# Patient Record
Sex: MALE | Race: WHITE | NOT HISPANIC OR LATINO | Employment: OTHER | URBAN - METROPOLITAN AREA
[De-identification: names, ages, dates, MRNs, and addresses within clinical notes are randomized per-mention and may not be internally consistent; named-entity substitution may affect disease eponyms.]

---

## 2023-10-10 DIAGNOSIS — C90.00 MULTIPLE MYELOMA, REMISSION STATUS UNSPECIFIED (MULTI): ICD-10-CM

## 2023-10-12 DIAGNOSIS — Z51.81 ENCOUNTER FOR THERAPEUTIC DRUG LEVEL MONITORING: ICD-10-CM

## 2023-10-12 DIAGNOSIS — I25.722 ATHEROSCLEROSIS OF AUTOLOGOUS ARTERY CORONARY ARTERY BYPASS GRAFT(S) WITH REFRACTORY ANGINA PECTORIS (CMS-HCC): ICD-10-CM

## 2023-10-12 DIAGNOSIS — Z98.890 HISTORY OF BEING SCREENED FOR LEAD EXPOSURE: ICD-10-CM

## 2023-10-12 DIAGNOSIS — R94.31 ABNORMAL ELECTROCARDIOGRAM (ECG) (EKG): ICD-10-CM

## 2023-10-12 DIAGNOSIS — R79.1 ABNORMAL COAGULATION PROFILE: ICD-10-CM

## 2023-10-12 DIAGNOSIS — C90.00 MULTIPLE MYELOMA NOT HAVING ACHIEVED REMISSION (MULTI): Primary | ICD-10-CM

## 2023-10-18 ENCOUNTER — DOCUMENTATION (OUTPATIENT)
Dept: OTHER | Facility: HOSPITAL | Age: 78
End: 2023-10-18
Payer: MEDICARE

## 2023-10-18 NOTE — PROGRESS NOTES
10/18/2023 9:00am    Today I met with patient and his wife Soni to begin our discussion about CAR-T cell transplant. I explained that Dr. Pizarro has discussed with him the need for CAR-T transplant. We discussed the general concept of transplant including T-cell collection, chemotherapy administration, and cell transfusion. We reviewed the workup process including organ function testing and laboratory testing, required for MD and financial clearance to proceed with cell collection and transplant. Patient was told the collection timeline and informed about the timeline for manufacturing of the specialized CAR-T cells. The administration and potential side effects of these medications and treatments were reviewed. The patient was provided with translated [employer] materials on the required drugs. The patient was educated about the T-cell collection process and discussed that he may need a central line placed for collection. Patient will be receiving Fludarabine and Cyclophosphamide as the preparative regimen prior to transplant. Administration and potential side effects of this treatment were reviewed with the patient. We discussed the 2 most common side effects of CAR-T cell transplants - Cytokine Release Syndrome and Neurotoxicity. Infection prevention measures such as strict hand washing, low pathogen diet, daily showering/CHG bathing, and frequent walking were reviewed. We discussed the goals of discharge and the need for a reliable caregiver to help monitor his wellbeing post-transplant and accompany him to post-transplant visits, which will occur in person 3 times per week, and virtually 3 times per week for 30 days post-transplant. We also explained that patient cannot drive for 8 weeks. The patient verbalized understanding of these measures, and stated that his daughter Zoey Fisher will primary caregiver. Patient will be discharged to home as he lives within 1 hr from .    Patient and wife were  engaged in the education process. We discussed that this information will continue to be reviewed throughout the workup process. I will be the transplant coordinator following his case and will address transplant related questions and concerns as needed.    Gertrudis Olivarez RN

## 2023-10-23 ENCOUNTER — TELEPHONE (OUTPATIENT)
Dept: HEMATOLOGY/ONCOLOGY | Facility: HOSPITAL | Age: 78
End: 2023-10-23
Payer: MEDICARE

## 2023-10-23 NOTE — TELEPHONE ENCOUNTER
Spoke with David. I have to have Dr. Contreras sign the order and then I will fax it over to [facility] tomorrow morning. Patient verbalized understanding and no further questions at this time..

## 2023-10-25 DIAGNOSIS — C90.00 MULTIPLE MYELOMA NOT HAVING ACHIEVED REMISSION (MULTI): ICD-10-CM

## 2023-10-25 DIAGNOSIS — Z94.84 STEM CELLS TRANSPLANT STATUS (MULTI): ICD-10-CM

## 2023-10-30 ENCOUNTER — SOCIAL WORK (OUTPATIENT)
Dept: HEMATOLOGY/ONCOLOGY | Facility: HOSPITAL | Age: 78
End: 2023-10-30
Payer: MEDICARE

## 2023-10-30 NOTE — PROGRESS NOTES
SW was notified by Nurse Coordinator that patient is to return home following discharge from CAR-T admission, and no psychosocial needs were identified at time of coordinator education.  Still, SW attempted to reach out to introduce self and services, but received voicemail. Left voicemail requesting reason for callback and assess if there are areas in need of assistance. Awaiting callback.     ADDENDUM:  SW received callback; patient requests assistance with any available grants, but states he does not have an email address for applications to be mailed. Requests [employer] packet mailed to him with available abisai opportunities.    ADDENDUM 11/16/23 @ 11:37:  SW attempted to follow up with patient via phone on this day to inquire as to whether or not patient had received abisai applications via [employer]. Received voicemail--left voicemail requesting callback to discuss. Awaiting callback.

## 2023-11-03 DIAGNOSIS — C90.00 MULTIPLE MYELOMA NOT HAVING ACHIEVED REMISSION (MULTI): ICD-10-CM

## 2023-11-03 DIAGNOSIS — Z94.84 STEM CELLS TRANSPLANT STATUS (MULTI): ICD-10-CM

## 2023-11-05 PROBLEM — G62.9 PERIPHERAL NEUROPATHY: Status: ACTIVE | Noted: 2023-11-05

## 2023-11-05 PROBLEM — C90.00 MULTIPLE MYELOMA (MULTI): Status: ACTIVE | Noted: 2023-11-05

## 2023-11-05 RX ORDER — SODIUM CHLORIDE 0.65 %
AEROSOL, SPRAY (ML) NASAL 3 TIMES DAILY PRN
COMMUNITY
Start: 2022-12-12 | End: 2024-01-31 | Stop reason: ALTCHOICE

## 2023-11-07 ENCOUNTER — OFFICE VISIT (OUTPATIENT)
Dept: CARDIOLOGY | Facility: HOSPITAL | Age: 78
End: 2023-11-07
Payer: MEDICARE

## 2023-11-07 ENCOUNTER — CLINICAL SUPPORT (OUTPATIENT)
Dept: OTHER | Facility: HOSPITAL | Age: 78
End: 2023-11-07
Payer: MEDICARE

## 2023-11-07 ENCOUNTER — HOSPITAL ENCOUNTER (OUTPATIENT)
Dept: RADIOLOGY | Facility: HOSPITAL | Age: 78
Discharge: HOME | End: 2023-11-07
Payer: MEDICARE

## 2023-11-07 ENCOUNTER — HOSPITAL ENCOUNTER (OUTPATIENT)
Dept: CARDIOLOGY | Facility: HOSPITAL | Age: 78
Discharge: HOME | End: 2023-11-07
Payer: MEDICARE

## 2023-11-07 ENCOUNTER — LAB (OUTPATIENT)
Dept: LAB | Facility: HOSPITAL | Age: 78
End: 2023-11-07
Payer: MEDICARE

## 2023-11-07 VITALS
DIASTOLIC BLOOD PRESSURE: 71 MMHG | HEART RATE: 60 BPM | OXYGEN SATURATION: 100 % | SYSTOLIC BLOOD PRESSURE: 141 MMHG | WEIGHT: 165.12 LBS | RESPIRATION RATE: 18 BRPM | HEIGHT: 67 IN | TEMPERATURE: 97.9 F | BODY MASS INDEX: 25.92 KG/M2

## 2023-11-07 DIAGNOSIS — C90.00 MULTIPLE MYELOMA NOT HAVING ACHIEVED REMISSION (MULTI): ICD-10-CM

## 2023-11-07 DIAGNOSIS — Z51.81 ENCOUNTER FOR THERAPEUTIC DRUG LEVEL MONITORING: ICD-10-CM

## 2023-11-07 DIAGNOSIS — Z01.810 PREOP CARDIOVASCULAR EXAM: Primary | ICD-10-CM

## 2023-11-07 DIAGNOSIS — I25.722 ATHEROSCLEROSIS OF AUTOLOGOUS ARTERY CORONARY ARTERY BYPASS GRAFT(S) WITH REFRACTORY ANGINA PECTORIS (CMS-HCC): ICD-10-CM

## 2023-11-07 DIAGNOSIS — Z98.890 HISTORY OF BEING SCREENED FOR LEAD EXPOSURE: ICD-10-CM

## 2023-11-07 DIAGNOSIS — R60.0 LOCALIZED EDEMA: ICD-10-CM

## 2023-11-07 DIAGNOSIS — R79.1 ABNORMAL COAGULATION PROFILE: ICD-10-CM

## 2023-11-07 DIAGNOSIS — R94.31 ABNORMAL ELECTROCARDIOGRAM (ECG) (EKG): ICD-10-CM

## 2023-11-07 PROBLEM — Z94.84 STEM CELLS TRANSPLANT STATUS (MULTI): Status: ACTIVE | Noted: 2023-11-07

## 2023-11-07 LAB
ABO GROUP (TYPE) IN BLOOD: NORMAL
ALBUMIN SERPL BCP-MCNC: 4 G/DL (ref 3.4–5)
ALP SERPL-CCNC: 64 U/L (ref 33–136)
ALT SERPL W P-5'-P-CCNC: 16 U/L (ref 10–52)
AMPHETAMINES UR QL SCN: NORMAL
ANION GAP SERPL CALC-SCNC: 12 MMOL/L (ref 10–20)
ANTIBODY SCREEN: NORMAL
APTT PPP: 28 SECONDS (ref 27–38)
AST SERPL W P-5'-P-CCNC: 17 U/L (ref 9–39)
B2 MICROGLOB SERPL-MCNC: 2.6 MG/L (ref 0.7–2.2)
BARBITURATES UR QL SCN: NORMAL
BASOPHILS # BLD AUTO: 0.02 X10*3/UL (ref 0–0.1)
BASOPHILS NFR BLD AUTO: 0.2 %
BENZODIAZ UR QL SCN: NORMAL
BILIRUB SERPL-MCNC: 0.7 MG/DL (ref 0–1.2)
BNP SERPL-MCNC: 24 PG/ML (ref 0–99)
BUN SERPL-MCNC: 21 MG/DL (ref 6–23)
BZE UR QL SCN: NORMAL
CA-I BLD-SCNC: 1.34 MMOL/L (ref 1.1–1.33)
CALCIUM SERPL-MCNC: 10.3 MG/DL (ref 8.6–10.3)
CANNABINOIDS UR QL SCN: NORMAL
CARDIAC TROPONIN I PNL SERPL HS: 10 NG/L (ref 0–53)
CHLORIDE SERPL-SCNC: 107 MMOL/L (ref 98–107)
CMV IGG AVIDITY SERPL IA-RTO: REACTIVE %
CO2 SERPL-SCNC: 26 MMOL/L (ref 21–32)
CREAT SERPL-MCNC: 0.85 MG/DL (ref 0.5–1.3)
EOSINOPHIL # BLD AUTO: 0.02 X10*3/UL (ref 0–0.4)
EOSINOPHIL NFR BLD AUTO: 0.2 %
ERYTHROCYTE [DISTWIDTH] IN BLOOD BY AUTOMATED COUNT: 13.7 % (ref 11.5–14.5)
FENTANYL+NORFENTANYL UR QL SCN: NORMAL
GFR SERPL CREATININE-BSD FRML MDRD: 89 ML/MIN/1.73M*2
GLUCOSE BLD MANUAL STRIP-MCNC: 108 MG/DL (ref 74–99)
GLUCOSE SERPL-MCNC: 106 MG/DL (ref 74–99)
HBV CORE AB SER QL: NONREACTIVE
HBV CORE IGM SER QL: NONREACTIVE
HBV SURFACE AB SER-ACNC: <3.1 MIU/ML
HBV SURFACE AG SERPL QL IA: NONREACTIVE
HCT VFR BLD AUTO: 36.1 % (ref 41–52)
HCV AB SER QL: NONREACTIVE
HERPES SIMPLEX VIRUS 1 IGG: <0.2 INDEX
HERPES SIMPLEX VIRUS 2 IGG: <0.2 INDEX
HGB BLD-MCNC: 12 G/DL (ref 13.5–17.5)
HIV 1+2 AB+HIV1 P24 AG SERPL QL IA: NONREACTIVE
IGA SERPL-MCNC: <7 MG/DL (ref 70–400)
IGG SERPL-MCNC: 1920 MG/DL (ref 700–1600)
IGM SERPL-MCNC: <5 MG/DL (ref 40–230)
IMM GRANULOCYTES # BLD AUTO: 0.06 X10*3/UL (ref 0–0.5)
IMM GRANULOCYTES NFR BLD AUTO: 0.5 % (ref 0–0.9)
INR PPP: 1.1 (ref 0.9–1.1)
LYMPHOCYTES # BLD AUTO: 1.25 X10*3/UL (ref 0.8–3)
LYMPHOCYTES NFR BLD AUTO: 10.3 %
MAGNESIUM SERPL-MCNC: 2.03 MG/DL (ref 1.6–2.4)
MCH RBC QN AUTO: 34.6 PG (ref 26–34)
MCHC RBC AUTO-ENTMCNC: 33.2 G/DL (ref 32–36)
MCV RBC AUTO: 104 FL (ref 80–100)
MONOCYTES # BLD AUTO: 0.77 X10*3/UL (ref 0.05–0.8)
MONOCYTES NFR BLD AUTO: 6.3 %
NEUTROPHILS # BLD AUTO: 10.03 X10*3/UL (ref 1.6–5.5)
NEUTROPHILS NFR BLD AUTO: 82.5 %
NRBC BLD-RTO: 0 /100 WBCS (ref 0–0)
OPIATES UR QL SCN: NORMAL
OXYCODONE+OXYMORPHONE UR QL SCN: NORMAL
PCP UR QL SCN: NORMAL
PLATELET # BLD AUTO: 325 X10*3/UL (ref 150–450)
POTASSIUM SERPL-SCNC: 3.6 MMOL/L (ref 3.5–5.3)
PROT SERPL-MCNC: 7.4 G/DL (ref 6.4–8.2)
PROT SERPL-MCNC: 7.5 G/DL (ref 6.4–8.2)
PROTHROMBIN TIME: 12.7 SECONDS (ref 9.8–12.8)
RBC # BLD AUTO: 3.47 X10*6/UL (ref 4.5–5.9)
RH FACTOR (ANTIGEN D): NORMAL
SODIUM SERPL-SCNC: 141 MMOL/L (ref 136–145)
T GONDII IGG SER-ACNC: NONREACTIVE
T PALLIDUM AB SER QL: NONREACTIVE
URATE SERPL-MCNC: 6.1 MG/DL (ref 4–7.5)
VARICELLA ZOSTER IGG INDEX: 0.6 IA
VZV IGG SER QL IA: NEGATIVE
WBC # BLD AUTO: 12.2 X10*3/UL (ref 4.4–11.3)

## 2023-11-07 PROCEDURE — 99204 OFFICE O/P NEW MOD 45 MIN: CPT | Performed by: STUDENT IN AN ORGANIZED HEALTH CARE EDUCATION/TRAINING PROGRAM

## 2023-11-07 PROCEDURE — 84165 PROTEIN E-PHORESIS SERUM: CPT | Performed by: STUDENT IN AN ORGANIZED HEALTH CARE EDUCATION/TRAINING PROGRAM

## 2023-11-07 PROCEDURE — 83021 HEMOGLOBIN CHROMOTOGRAPHY: CPT

## 2023-11-07 PROCEDURE — 82330 ASSAY OF CALCIUM: CPT

## 2023-11-07 PROCEDURE — 86644 CMV ANTIBODY: CPT

## 2023-11-07 PROCEDURE — 1126F AMNT PAIN NOTED NONE PRSNT: CPT | Performed by: STUDENT IN AN ORGANIZED HEALTH CARE EDUCATION/TRAINING PROGRAM

## 2023-11-07 PROCEDURE — 87340 HEPATITIS B SURFACE AG IA: CPT

## 2023-11-07 PROCEDURE — 84165 PROTEIN E-PHORESIS SERUM: CPT

## 2023-11-07 PROCEDURE — 86900 BLOOD TYPING SEROLOGIC ABO: CPT | Mod: 91

## 2023-11-07 PROCEDURE — 1159F MED LIST DOCD IN RCRD: CPT | Performed by: STUDENT IN AN ORGANIZED HEALTH CARE EDUCATION/TRAINING PROGRAM

## 2023-11-07 PROCEDURE — 86705 HEP B CORE ANTIBODY IGM: CPT

## 2023-11-07 PROCEDURE — 82784 ASSAY IGA/IGD/IGG/IGM EACH: CPT

## 2023-11-07 PROCEDURE — 36415 COLL VENOUS BLD VENIPUNCTURE: CPT

## 2023-11-07 PROCEDURE — 84550 ASSAY OF BLOOD/URIC ACID: CPT

## 2023-11-07 PROCEDURE — 83521 IG LIGHT CHAINS FREE EACH: CPT

## 2023-11-07 PROCEDURE — 87799 DETECT AGENT NOS DNA QUANT: CPT

## 2023-11-07 PROCEDURE — 85025 COMPLETE CBC W/AUTO DIFF WBC: CPT

## 2023-11-07 PROCEDURE — 3430000001 HC RX 343 DIAGNOSTIC RADIOPHARMACEUTICALS: Performed by: INTERNAL MEDICINE

## 2023-11-07 PROCEDURE — 93005 ELECTROCARDIOGRAM TRACING: CPT

## 2023-11-07 PROCEDURE — 85730 THROMBOPLASTIN TIME PARTIAL: CPT | Mod: 91

## 2023-11-07 PROCEDURE — 86777 TOXOPLASMA ANTIBODY: CPT

## 2023-11-07 PROCEDURE — 86803 HEPATITIS C AB TEST: CPT

## 2023-11-07 PROCEDURE — 86645 CMV ANTIBODY IGM: CPT

## 2023-11-07 PROCEDURE — 80307 DRUG TEST PRSMV CHEM ANLYZR: CPT

## 2023-11-07 PROCEDURE — 93306 TTE W/DOPPLER COMPLETE: CPT

## 2023-11-07 PROCEDURE — 93010 ELECTROCARDIOGRAM REPORT: CPT | Performed by: INTERNAL MEDICINE

## 2023-11-07 PROCEDURE — A9552 F18 FDG: HCPCS | Performed by: INTERNAL MEDICINE

## 2023-11-07 PROCEDURE — 86870 RBC ANTIBODY IDENTIFICATION: CPT

## 2023-11-07 PROCEDURE — 86787 VARICELLA-ZOSTER ANTIBODY: CPT

## 2023-11-07 PROCEDURE — 82232 ASSAY OF BETA-2 PROTEIN: CPT

## 2023-11-07 PROCEDURE — 85730 THROMBOPLASTIN TIME PARTIAL: CPT

## 2023-11-07 PROCEDURE — 89240 UNLISTED MISC PATH TEST: CPT

## 2023-11-07 PROCEDURE — 87389 HIV-1 AG W/HIV-1&-2 AB AG IA: CPT

## 2023-11-07 PROCEDURE — 86704 HEP B CORE ANTIBODY TOTAL: CPT

## 2023-11-07 PROCEDURE — 82947 ASSAY GLUCOSE BLOOD QUANT: CPT

## 2023-11-07 PROCEDURE — 86706 HEP B SURFACE ANTIBODY: CPT

## 2023-11-07 PROCEDURE — 86664 EPSTEIN-BARR NUCLEAR ANTIGEN: CPT

## 2023-11-07 PROCEDURE — 84155 ASSAY OF PROTEIN SERUM: CPT

## 2023-11-07 PROCEDURE — 86077 PHYS BLOOD BANK SERV XMATCH: CPT | Performed by: STUDENT IN AN ORGANIZED HEALTH CARE EDUCATION/TRAINING PROGRAM

## 2023-11-07 PROCEDURE — 83880 ASSAY OF NATRIURETIC PEPTIDE: CPT

## 2023-11-07 PROCEDURE — 86790 VIRUS ANTIBODY NOS: CPT

## 2023-11-07 PROCEDURE — 86780 TREPONEMA PALLIDUM: CPT

## 2023-11-07 PROCEDURE — 93306 TTE W/DOPPLER COMPLETE: CPT | Performed by: INTERNAL MEDICINE

## 2023-11-07 PROCEDURE — 78816 PET IMAGE W/CT FULL BODY: CPT | Performed by: NUCLEAR MEDICINE

## 2023-11-07 PROCEDURE — 78816 PET IMAGE W/CT FULL BODY: CPT

## 2023-11-07 PROCEDURE — 84484 ASSAY OF TROPONIN QUANT: CPT

## 2023-11-07 PROCEDURE — 83735 ASSAY OF MAGNESIUM: CPT

## 2023-11-07 PROCEDURE — 86850 RBC ANTIBODY SCREEN: CPT

## 2023-11-07 PROCEDURE — 86696 HERPES SIMPLEX TYPE 2 TEST: CPT

## 2023-11-07 PROCEDURE — 84155 ASSAY OF PROTEIN SERUM: CPT | Mod: 59

## 2023-11-07 PROCEDURE — 86665 EPSTEIN-BARR CAPSID VCA: CPT | Mod: 91

## 2023-11-07 PROCEDURE — 86696 HERPES SIMPLEX TYPE 2 TEST: CPT | Mod: 91

## 2023-11-07 PROCEDURE — 80053 COMPREHEN METABOLIC PANEL: CPT

## 2023-11-07 PROCEDURE — 99214 OFFICE O/P EST MOD 30 MIN: CPT | Mod: 25 | Performed by: STUDENT IN AN ORGANIZED HEALTH CARE EDUCATION/TRAINING PROGRAM

## 2023-11-07 PROCEDURE — 83020 HEMOGLOBIN ELECTROPHORESIS: CPT | Performed by: PATHOLOGY

## 2023-11-07 RX ORDER — FLUDEOXYGLUCOSE F 18 200 MCI/ML
10.3 INJECTION, SOLUTION INTRAVENOUS
Status: COMPLETED | OUTPATIENT
Start: 2023-11-07 | End: 2023-11-07

## 2023-11-07 RX ORDER — GABAPENTIN 300 MG/1
300 CAPSULE ORAL 4 TIMES DAILY
COMMUNITY
End: 2024-01-31 | Stop reason: SDUPTHER

## 2023-11-07 RX ORDER — ACYCLOVIR 400 MG/1
TABLET ORAL
COMMUNITY
End: 2024-01-31 | Stop reason: SDUPTHER

## 2023-11-07 RX ORDER — DEXAMETHASONE 4 MG/1
4 TABLET ORAL
COMMUNITY
End: 2024-01-31 | Stop reason: ALTCHOICE

## 2023-11-07 RX ADMIN — FLUDEOXYGLUCOSE F 18 10.3 MILLICURIE: 200 INJECTION, SOLUTION INTRAVENOUS at 09:00

## 2023-11-07 ASSESSMENT — PAIN - FUNCTIONAL ASSESSMENT: PAIN_FUNCTIONAL_ASSESSMENT: 0-10

## 2023-11-07 ASSESSMENT — PAIN SCALES - GENERAL
PAINLEVEL_OUTOF10: 0 - NO PAIN
PAINLEVEL: 0-NO PAIN

## 2023-11-07 ASSESSMENT — ENCOUNTER SYMPTOMS
DEPRESSION: 0
OCCASIONAL FEELINGS OF UNSTEADINESS: 0
LOSS OF SENSATION IN FEET: 0

## 2023-11-07 NOTE — PROGRESS NOTES
"Pt ambulated to SCT unit without assistance. Pt accompanied by son today.  Pt denies complaints or pain today. Vitals obtained, blood drawn in outpatient lab prior to arrival on unit. Pt on floor for DHQ prior to stem cell collection today:  \"Today I had the pleasure of meeting  78 year old David Ferreira(MRN [id number]) and his son for ABECMA CAR-T Q. He is a MM patient. Patient will be covered by Dr. Cerrato. His tentative date of collection is set for 12/4/23 in the ambulatory stem cell unit with line placement that morning. Pt has history of peripheral neuropathy. Pt also has surgical history of right carpal tunnel repair with right elbow and ulnar decompression (2021) and hernia repair (2011).  No recent travel or exposure to any communicable diseases. Reviewed PI sheet with patient and answered all questions. Reviewed this information with Dr. Keller; pt will be started on IV calcium at beginning of collection due to peripheral neuropathy.     Allergies:  NKA     Vital Signs: Ht= 169.3cm , Wt= 74.9kg, BP= 141/71, SpO2= 100% @room air, RR= 18, T= 36.6                      HR= 60  Medications: Acyclovir, Gabapentin, melatonin, capazasin                           Chemo: Dexamethasone, Kyprolis, and Daratumumab\"       Pt ambulated off unit without complaints or assistance.  "

## 2023-11-07 NOTE — PROGRESS NOTES
Subjective   David Ferreira is a 78 y.o. male     Cancer Diagnosis: MM   Treatment: VRD 3/2023, D-Kd 5/2023   Cumulative Dose: NA  Radiation: No     Risk Factors:  Social Hx: Former Smoker   Family Hx: Father CAD age 59    Echo 11/7/2023: My review shows preserved ejection fraction (55 and 60%), mild aortic regurgitation.  No clear evidence of cardiac amyloidosis  EKG November 7, 2023: Normal sinus rhythm, no resting ST-T segment changes; minimal voltage criteria for LVH.      He is here for cardiovascular evaluation prior to CAR-T        Patient is a 78-year-old gentleman with a diagnosis of multiple myeloma.  Has been on treatment with VRD chemotherapy; other medical history includes peripheral neuropathy and some orthopedic complaints including carpal tunnel syndrome.  Intent is to pursue possible CAR-T.     On this visit patient denies any chest discomfort or shortness of breath with exertion.  Denies any orthopnea/PND.  Has minimal lower extremity edema with evidence of varicosities in his lower extremities (bilateral ankles)    Review of Systems  A comprehensive review of systems was negative.     Past medical history: Rotator cuff dysfunction, multiple myeloma  No past surgical history on file.  No Known Allergies    Family history of CAD in his father (age 59)    Physical Exam  Objective   HR 60bpm, /71, 97.9F        General: awake, alert and oriented. No acute distress.   Skin: Skin is warm, dry and intact without rashes or lesions. Appropriate color for ethnicity. Nail beds pink with no cyanosis or clubbing  HEENT: normocephalic, atraumatic; conjunctivae are clear without exudates or hemorrhage. Sclera is non-icteric. Eyelids are normal in appearance without swelling or lesions. Hearing intact. Nares are patent bilaterally. Moist mucous membranes.   Cardiovascular: Regular. No murmurs, gallops, or rubs are auscultated. S1 and S2 are heard and are of normal intensity. No JVD, no carotid  bruits  Respiratory: Thorax symmetric. CTAB, breath sounds vesicular. No crackles, wheezes or ronchi.   Gastrointestinal: soft, non-distended, BS + x 4  Genitourinary: exam deferred  Musculoskeletal: moves all extremities  Extremities: pulses palpable bilaterally; no swelling or erythema; no edema  Neurological: alert & oriented x 3; no focal deficits  Psychiatric: appropriate mood and affect        Lab Review   Lab Results   Component Value Date    HGB 12.0 (L) 11/07/2023    HGB 10.4 (L) 07/20/2023     11/07/2023    WBC 12.2 (H) 11/07/2023     11/07/2023    K 3.6 11/07/2023    CREATININE 0.85 11/07/2023    CREATININE 1.19 07/20/2023    BUN 21 11/07/2023    CALCIUM 10.3 11/07/2023        Assessment/Plan   Echo 11/7/2023: My review shows preserved ejection fraction (55 and 60%), mild aortic regurgitation.  No clear evidence of cardiac amyloidosis.  He is here for cardiovascular evaluation prior to CAR-T        He appears to be well optimized from the standpoint of CAR-T therapy.  No further testing required from a cardiac standpoint.  -Echocardiogram/EKG appear to be unremarkable.  We will follow-up on results of troponin/BNP.  -Minimal ankle edema is likely attributable to venous insufficiency  -Follow-up a month after discharge after CAR-T       Koby Abebe MD  Advanced Heart Failure/Transplant Cardiology  Cardio-Oncology  [facility]

## 2023-11-08 ENCOUNTER — APPOINTMENT (OUTPATIENT)
Dept: OTHER | Facility: HOSPITAL | Age: 78
End: 2023-11-08
Payer: MEDICARE

## 2023-11-08 LAB
AORTIC VALVE PEAK VELOCITY: 1.32
AV PEAK GRADIENT: 7
AVA (PEAK VEL): 2.26
EBV DNA SPEC NAA+PROBE-LOG#: NORMAL {LOG_COPIES}/ML
EBV EA IGG SER QL: NEGATIVE
EBV NA AB SER QL: NEGATIVE
EBV VCA IGG SER IA-ACNC: POSITIVE
EBV VCA IGM SER IA-ACNC: ABNORMAL
EJECTION FRACTION APICAL 4 CHAMBER: 67.4
EJECTION FRACTION: 63
KAPPA LC SERPL-MCNC: 47.2 MG/DL (ref 0.33–1.94)
KAPPA LC/LAMBDA SER: ABNORMAL {RATIO}
LABORATORY COMMENT REPORT: NOT DETECTED
LAMBDA LC SERPL-MCNC: <0.17 MG/DL (ref 0.57–2.63)
LEFT ATRIUM VOLUME AREA LENGTH INDEX BSA: 42.3
LEFT VENTRICLE INTERNAL DIMENSION DIASTOLE: 4.6 (ref 3.5–6)
LEFT VENTRICULAR OUTFLOW TRACT DIAMETER: 2
MITRAL VALVE E/A RATIO: 1.34
RIGHT VENTRICLE FREE WALL PEAK S': 13.5
RIGHT VENTRICLE PEAK SYSTOLIC PRESSURE: 26.4
TRICUSPID ANNULAR PLANE SYSTOLIC EXCURSION: 1.4

## 2023-11-09 ENCOUNTER — PROCEDURE VISIT (OUTPATIENT)
Dept: OTHER | Facility: HOSPITAL | Age: 78
End: 2023-11-09
Payer: MEDICARE

## 2023-11-09 ENCOUNTER — APPOINTMENT (OUTPATIENT)
Dept: RADIOLOGY | Facility: HOSPITAL | Age: 78
End: 2023-11-09
Payer: MEDICARE

## 2023-11-09 ENCOUNTER — HOSPITAL ENCOUNTER (OUTPATIENT)
Dept: RADIOLOGY | Facility: EXTERNAL LOCATION | Age: 78
Discharge: HOME | End: 2023-11-09

## 2023-11-09 VITALS
RESPIRATION RATE: 18 BRPM | OXYGEN SATURATION: 100 % | TEMPERATURE: 98.2 F | DIASTOLIC BLOOD PRESSURE: 86 MMHG | HEART RATE: 79 BPM | WEIGHT: 166.89 LBS | SYSTOLIC BLOOD PRESSURE: 150 MMHG | BODY MASS INDEX: 26.41 KG/M2

## 2023-11-09 DIAGNOSIS — C90.00 MULTIPLE MYELOMA, REMISSION STATUS UNSPECIFIED (MULTI): Primary | ICD-10-CM

## 2023-11-09 LAB
ADENOVIRUS QPCR,PLASMA, VIRC: NOT DETECTED COPIES/ML
ALBUMIN: 4.2 G/DL (ref 3.4–5)
ALPHA 1 GLOBULIN: 0.3 G/DL (ref 0.2–0.6)
ALPHA 2 GLOBULIN: 0.7 G/DL (ref 0.4–1.1)
BASOPHILS # BLD AUTO: 0.02 X10*3/UL (ref 0–0.1)
BASOPHILS NFR BLD AUTO: 0.2 %
BETA GLOBULIN: 0.6 G/DL (ref 0.5–1.2)
CMV DNA SERPL NAA+PROBE-LOG IU: NORMAL {LOG_IU}/ML
CMV IGM SERPL-ACNC: <8 AU/ML
EOSINOPHIL # BLD AUTO: 0.1 X10*3/UL (ref 0–0.4)
EOSINOPHIL NFR BLD AUTO: 1.2 %
ERYTHROCYTE [DISTWIDTH] IN BLOOD BY AUTOMATED COUNT: 13.3 % (ref 11.5–14.5)
GAMMA GLOBULIN: 1.7 G/DL (ref 0.5–1.4)
HCT VFR BLD AUTO: 36.9 % (ref 41–52)
HEMOGLOBIN A2: 2.7 % (ref 2–3.5)
HEMOGLOBIN A: 97.1 % (ref 95.8–98)
HEMOGLOBIN F: 0.2 % (ref 0–2)
HEMOGLOBIN IDENTIFICATION INTERPRETATION: NORMAL
HGB BLD-MCNC: 12.3 G/DL (ref 13.5–17.5)
IMM GRANULOCYTES # BLD AUTO: 0.09 X10*3/UL (ref 0–0.5)
IMM GRANULOCYTES NFR BLD AUTO: 1.1 % (ref 0–0.9)
LABORATORY COMMENT REPORT: NOT DETECTED
LYMPHOCYTES # BLD AUTO: 1.3 X10*3/UL (ref 0.8–3)
LYMPHOCYTES NFR BLD AUTO: 15.3 %
M-PROTEIN 1: 1.3 G/DL
MCH RBC QN AUTO: 34.6 PG (ref 26–34)
MCHC RBC AUTO-ENTMCNC: 33.3 G/DL (ref 32–36)
MCV RBC AUTO: 104 FL (ref 80–100)
MONOCYTES # BLD AUTO: 0.7 X10*3/UL (ref 0.05–0.8)
MONOCYTES NFR BLD AUTO: 8.2 %
NEUTROPHILS # BLD AUTO: 6.31 X10*3/UL (ref 1.6–5.5)
NEUTROPHILS NFR BLD AUTO: 74 %
NRBC BLD-RTO: 0 /100 WBCS (ref 0–0)
PATH REVIEW-HGB IDENTIFICATION: NORMAL
PATH REVIEW-SERUM PROTEIN ELECTROPHORESIS: ABNORMAL
PLATELET # BLD AUTO: 282 X10*3/UL (ref 150–450)
PROTEIN ELECTROPHORESIS COMMENT: ABNORMAL
RBC # BLD AUTO: 3.55 X10*6/UL (ref 4.5–5.9)
WBC # BLD AUTO: 8.5 X10*3/UL (ref 4.4–11.3)

## 2023-11-09 PROCEDURE — 88342 IMHCHEM/IMCYTCHM 1ST ANTB: CPT | Mod: TC,59

## 2023-11-09 PROCEDURE — 38222 DX BONE MARROW BX & ASPIR: CPT | Performed by: STUDENT IN AN ORGANIZED HEALTH CARE EDUCATION/TRAINING PROGRAM

## 2023-11-09 PROCEDURE — 88305 TISSUE EXAM BY PATHOLOGIST: CPT | Mod: TC,SUR

## 2023-11-09 PROCEDURE — 88341 IMHCHEM/IMCYTCHM EA ADD ANTB: CPT | Performed by: PATHOLOGY

## 2023-11-09 PROCEDURE — 88188 FLOWCYTOMETRY/READ 9-15: CPT | Performed by: PATHOLOGY

## 2023-11-09 PROCEDURE — 88237 TISSUE CULTURE BONE MARROW: CPT

## 2023-11-09 PROCEDURE — 36415 COLL VENOUS BLD VENIPUNCTURE: CPT

## 2023-11-09 PROCEDURE — 85097 BONE MARROW INTERPRETATION: CPT | Mod: TC

## 2023-11-09 PROCEDURE — 88185 FLOWCYTOMETRY/TC ADD-ON: CPT | Mod: TC

## 2023-11-09 PROCEDURE — 88189 FLOWCYTOMETRY/READ 16 & >: CPT | Performed by: PATHOLOGY

## 2023-11-09 PROCEDURE — 88184 FLOWCYTOMETRY/ TC 1 MARKER: CPT | Mod: TC

## 2023-11-09 PROCEDURE — 88305 TISSUE EXAM BY PATHOLOGIST: CPT | Performed by: PATHOLOGY

## 2023-11-09 PROCEDURE — 88187 FLOWCYTOMETRY/READ 2-8: CPT | Performed by: PATHOLOGY

## 2023-11-09 PROCEDURE — 88342 IMHCHEM/IMCYTCHM 1ST ANTB: CPT | Performed by: PATHOLOGY

## 2023-11-09 PROCEDURE — 88365 INSITU HYBRIDIZATION (FISH): CPT | Performed by: PATHOLOGY

## 2023-11-09 PROCEDURE — 85025 COMPLETE CBC W/AUTO DIFF WBC: CPT

## 2023-11-09 NOTE — PROGRESS NOTES
Bone Marrow Biopsy and Aspiration Procedure Note       Informed consent was obtained and potential risks including bleeding, infection and pain were reviewed with the patient.    Last H&P: 11/7/2023    The right posterior iliac crest was prepped with chlorhexidine.     10 ml of lidocaine 1% local anesthesia infiltrated into the subcutaneous tissue.    Rright bone marrow aspirate was obtained. Right bone marrow core biopsy was attempted twice but unable to collect specimen.     The procedure was tolerated well and there were no complications.    Specimens sent for: routine histopathologic stains and sectioning, flow cytometry, cytogenetics, molecular analysis, and Clonoseq.     Procedure completed by: Cristian Morillo PA-C consent

## 2023-11-09 NOTE — PROGRESS NOTES
Patient arrived to ASCT unit via self-ambulation for bone marrow biopsy with son. He is alert and oriented x3, denies pain or any discomfort. Vital signs obtained. Blood drawn peripherally. DORETHA Petersen performed procedure. Dressing is clean, dry and intact. Education provided. No adverse reactions, no complaints and no assistance needed. Pt left unit with Sasha, his nurse coordinator.

## 2023-11-10 LAB
BB ANTIBODY IDENTIFICATION: NORMAL
CASE #: NORMAL
HTLV I+II AB SER QL IA: NEGATIVE
PATH REV-IMMUNOHEMATOLOGY-PR30: NORMAL

## 2023-11-11 LAB — SCAN RESULT: NORMAL

## 2023-11-13 LAB
ATRIAL RATE: 64 BPM
CELL COUNT (BLOOD): 5.7 X10*3/UL
CELL POPULATIONS: NORMAL
DIAGNOSIS: NORMAL
FLOW DIFFERENTIAL: NORMAL
FLOW TEST ORDERED: NORMAL
LAB TEST METHOD: NORMAL
NUMBER OF CELLS COLLECTED: NORMAL PER TUBE
P AXIS: 34 DEGREES
P OFFSET: 167 MS
P ONSET: 107 MS
PATH REPORT.TOTAL CANCER: NORMAL
PR INTERVAL: 220 MS
Q ONSET: 217 MS
QRS COUNT: 10 BEATS
QRS DURATION: 94 MS
QT INTERVAL: 380 MS
QTC CALCULATION(BAZETT): 392 MS
QTC FREDERICIA: 388 MS
R AXIS: -9 DEGREES
SIGNATURE COMMENT: NORMAL
SPECIMEN VIABILITY: NORMAL
T AXIS: 57 DEGREES
T OFFSET: 407 MS
VENTRICULAR RATE: 64 BPM

## 2023-11-14 LAB
CHROM ANALY OVERALL INTERP-IMP: NORMAL
ELECTRONICALLY SIGNED BY CYTOGENETICS: NORMAL

## 2023-11-16 LAB
PATH REPORT.ADDENDUM SPEC: NORMAL
PATH REPORT.COMMENTS IMP SPEC: NORMAL
PATH REPORT.FINAL DX SPEC: NORMAL
PATH REPORT.GROSS SPEC: NORMAL
PATH REPORT.MICROSCOPIC SPEC OTHER STN: NORMAL
PATH REPORT.RELEVANT HX SPEC: NORMAL
PATH REPORT.TOTAL CANCER: NORMAL

## 2023-11-20 ENCOUNTER — DOCUMENTATION (OUTPATIENT)
Dept: OTHER | Facility: HOSPITAL | Age: 78
End: 2023-11-20
Payer: MEDICARE

## 2023-11-28 DIAGNOSIS — C90.00 MULTIPLE MYELOMA NOT HAVING ACHIEVED REMISSION (MULTI): ICD-10-CM

## 2023-11-30 ENCOUNTER — OFFICE VISIT (OUTPATIENT)
Dept: HEMATOLOGY/ONCOLOGY | Facility: HOSPITAL | Age: 78
End: 2023-11-30
Payer: MEDICARE

## 2023-11-30 ENCOUNTER — LAB (OUTPATIENT)
Dept: LAB | Facility: HOSPITAL | Age: 78
End: 2023-11-30
Payer: MEDICARE

## 2023-11-30 ENCOUNTER — DOCUMENTATION (OUTPATIENT)
Dept: OTHER | Facility: HOSPITAL | Age: 78
End: 2023-11-30
Payer: MEDICARE

## 2023-11-30 VITALS
WEIGHT: 170.64 LBS | HEART RATE: 73 BPM | TEMPERATURE: 97 F | BODY MASS INDEX: 27 KG/M2 | RESPIRATION RATE: 18 BRPM | OXYGEN SATURATION: 100 % | SYSTOLIC BLOOD PRESSURE: 160 MMHG | DIASTOLIC BLOOD PRESSURE: 79 MMHG

## 2023-11-30 DIAGNOSIS — C90.00 MULTIPLE MYELOMA NOT HAVING ACHIEVED REMISSION (MULTI): ICD-10-CM

## 2023-11-30 DIAGNOSIS — C90.00 MULTIPLE MYELOMA NOT HAVING ACHIEVED REMISSION (MULTI): Primary | ICD-10-CM

## 2023-11-30 LAB
ALBUMIN SERPL BCP-MCNC: 3.7 G/DL (ref 3.4–5)
ALP SERPL-CCNC: 66 U/L (ref 33–136)
ALT SERPL W P-5'-P-CCNC: 18 U/L (ref 10–52)
ANION GAP SERPL CALC-SCNC: 10 MMOL/L (ref 10–20)
AST SERPL W P-5'-P-CCNC: 17 U/L (ref 9–39)
B2 MICROGLOB SERPL-MCNC: 3.7 MG/L (ref 0.7–2.2)
BASOPHILS # BLD AUTO: 0.01 X10*3/UL (ref 0–0.1)
BASOPHILS NFR BLD AUTO: 0.2 %
BILIRUB SERPL-MCNC: 0.6 MG/DL (ref 0–1.2)
BUN SERPL-MCNC: 23 MG/DL (ref 6–23)
CALCIUM SERPL-MCNC: 10.1 MG/DL (ref 8.6–10.3)
CHLORIDE SERPL-SCNC: 108 MMOL/L (ref 98–107)
CO2 SERPL-SCNC: 28 MMOL/L (ref 21–32)
CREAT SERPL-MCNC: 0.99 MG/DL (ref 0.5–1.3)
EOSINOPHIL # BLD AUTO: 0.05 X10*3/UL (ref 0–0.4)
EOSINOPHIL NFR BLD AUTO: 0.8 %
ERYTHROCYTE [DISTWIDTH] IN BLOOD BY AUTOMATED COUNT: 13 % (ref 11.5–14.5)
GFR SERPL CREATININE-BSD FRML MDRD: 78 ML/MIN/1.73M*2
GLUCOSE SERPL-MCNC: 92 MG/DL (ref 74–99)
HCT VFR BLD AUTO: 35.3 % (ref 41–52)
HGB BLD-MCNC: 11.6 G/DL (ref 13.5–17.5)
IGA SERPL-MCNC: <7 MG/DL (ref 70–400)
IGG SERPL-MCNC: 1690 MG/DL (ref 700–1600)
IGM SERPL-MCNC: <5 MG/DL (ref 40–230)
IMM GRANULOCYTES # BLD AUTO: 0.26 X10*3/UL (ref 0–0.5)
IMM GRANULOCYTES NFR BLD AUTO: 4.1 % (ref 0–0.9)
LDH SERPL L TO P-CCNC: 176 U/L (ref 84–246)
LYMPHOCYTES # BLD AUTO: 1.35 X10*3/UL (ref 0.8–3)
LYMPHOCYTES NFR BLD AUTO: 21.4 %
MCH RBC QN AUTO: 34.5 PG (ref 26–34)
MCHC RBC AUTO-ENTMCNC: 32.9 G/DL (ref 32–36)
MCV RBC AUTO: 105 FL (ref 80–100)
MONOCYTES # BLD AUTO: 0.8 X10*3/UL (ref 0.05–0.8)
MONOCYTES NFR BLD AUTO: 12.7 %
NEUTROPHILS # BLD AUTO: 3.85 X10*3/UL (ref 1.6–5.5)
NEUTROPHILS NFR BLD AUTO: 60.8 %
NRBC BLD-RTO: 1.3 /100 WBCS (ref 0–0)
PLATELET # BLD AUTO: 142 X10*3/UL (ref 150–450)
POTASSIUM SERPL-SCNC: 4.8 MMOL/L (ref 3.5–5.3)
PROT SERPL-MCNC: 6.6 G/DL (ref 6.4–8.2)
PROT SERPL-MCNC: 7 G/DL (ref 6.4–8.2)
RBC # BLD AUTO: 3.36 X10*6/UL (ref 4.5–5.9)
SODIUM SERPL-SCNC: 141 MMOL/L (ref 136–145)
WBC # BLD AUTO: 6.3 X10*3/UL (ref 4.4–11.3)

## 2023-11-30 PROCEDURE — 84165 PROTEIN E-PHORESIS SERUM: CPT

## 2023-11-30 PROCEDURE — 84155 ASSAY OF PROTEIN SERUM: CPT

## 2023-11-30 PROCEDURE — 83615 LACTATE (LD) (LDH) ENZYME: CPT

## 2023-11-30 PROCEDURE — 82232 ASSAY OF BETA-2 PROTEIN: CPT

## 2023-11-30 PROCEDURE — 99215 OFFICE O/P EST HI 40 MIN: CPT | Performed by: INTERNAL MEDICINE

## 2023-11-30 PROCEDURE — 82784 ASSAY IGA/IGD/IGG/IGM EACH: CPT

## 2023-11-30 PROCEDURE — 1159F MED LIST DOCD IN RCRD: CPT | Performed by: INTERNAL MEDICINE

## 2023-11-30 PROCEDURE — 86320 SERUM IMMUNOELECTROPHORESIS: CPT | Performed by: STUDENT IN AN ORGANIZED HEALTH CARE EDUCATION/TRAINING PROGRAM

## 2023-11-30 PROCEDURE — 85025 COMPLETE CBC W/AUTO DIFF WBC: CPT

## 2023-11-30 PROCEDURE — 36415 COLL VENOUS BLD VENIPUNCTURE: CPT

## 2023-11-30 PROCEDURE — 83521 IG LIGHT CHAINS FREE EACH: CPT

## 2023-11-30 PROCEDURE — 1125F AMNT PAIN NOTED PAIN PRSNT: CPT | Performed by: INTERNAL MEDICINE

## 2023-11-30 PROCEDURE — 80053 COMPREHEN METABOLIC PANEL: CPT

## 2023-11-30 PROCEDURE — 84165 PROTEIN E-PHORESIS SERUM: CPT | Performed by: STUDENT IN AN ORGANIZED HEALTH CARE EDUCATION/TRAINING PROGRAM

## 2023-11-30 PROCEDURE — 1036F TOBACCO NON-USER: CPT | Performed by: INTERNAL MEDICINE

## 2023-11-30 ASSESSMENT — PAIN SCALES - GENERAL: PAINLEVEL: 5

## 2023-11-30 NOTE — PROGRESS NOTES
11/30/2023 9:00am    SCT coordinator met with patient and Dr. Contreras to discuss changes in his plan of care. Patient was initially on track to receive CART treatment but due to insurance denial is now recommended for auto SCT. Dr. Contreras went over the autologous transplant process in detail and answered all patient questions. SCT coordinator also stayed back at end of the clinic visit to do a brief education about auto SCT and answered all patient questions. Patient was told he would be sent his updated schedule soon and that he should reach out with any questions or concerns.     Gertrudis Olivarez RN.

## 2023-12-01 ENCOUNTER — APPOINTMENT (OUTPATIENT)
Dept: HEMATOLOGY/ONCOLOGY | Facility: HOSPITAL | Age: 78
End: 2023-12-01
Payer: MEDICARE

## 2023-12-01 PROBLEM — C90.00 IGG MULTIPLE MYELOMA (MULTI): Status: ACTIVE | Noted: 2023-12-01

## 2023-12-01 LAB
KAPPA LC SERPL-MCNC: 43.52 MG/DL (ref 0.33–1.94)
KAPPA LC/LAMBDA SER: ABNORMAL {RATIO}
LAMBDA LC SERPL-MCNC: <0.17 MG/DL (ref 0.57–2.63)

## 2023-12-02 NOTE — PROGRESS NOTES
Cancer History:   Treatment Synopsis:    MM IgG kappa        History of Present Illness:      ID Statement:    JEINFER HART is a 77 year old Male        Chief Complaint: MM IgG kappa   Interval History:    Patient is with newly diagnosed Multiple myeloma IgG kappa. He is here for further discussion re: auto SCT as part of consolidation therapy for Multiple Myeloma.     Recent BW showed elevated protein-> further work up c/w Multiple Myeloma. Bone marrow Bx(1/27/23) 30-40% kappa restricted plasma cells FISH without high risk features M protein 3.8 g/dL  B2M 3.8 IgG 6072 free [employer] 3.3  Kappa/Lambda ratio 589   alb 4.0  CT/PET 1/30/23 showed no abnormal lesions.  Patient denies recent weight loss, night sweats, poor appetites. He c/o right shoulder pain 2/2 rotator cuff tear.  He also c/o numbing/tingling both hands 2/2 CTS and bilateral LE pain for which he takes Gabapentin daily.  5/25/23: tolerating VRd. here for follow up. Neuropathy not worse  7/20/23: tolerating new chemo D-Kd. No recent weight loss, night sweats, SOB  9/21/23: doing well. No new c/o  11/30/23: no new c/o except neuropathy(not worse) all work for auto SCT done except PFT.     ROS: negative except HPI     PMHx: Peripheral neuropathy bilateral LE's 2016 on gabapentin   Rotator cuff tear right.  ~6 month ago   PSHx; CTS repair bilateral: right ~ 2 years ago, left 3-4 months ago     Fhx: mother with colon cancer at 74  Father with CAD at 59           Allergies and Intolerances:       Allergies:         No Known Allergies: Active     Outpatient Medication Profile:  * Patient Currently Takes Medications as of 21-Sep-2023 12:00 documented in Structured Notes         gabapentin 300 mg oral tablet : Last Dose Taken:           Melatonin 3 mg oral tablet: Last Dose Taken:  , 1 tab(s) orally once  a day (at bedtime)         capsaicin 0.1% topical cream: Last Dose Taken:          Family History: No Family History items are recorded  in  the problem list.      Social History:   Social Substance History:  ·  Smoking Status former smoker (1)   ·  Additional History     , 2 grown children 4 grand children(1)           Vitals and Measurements:   Vitals: Temp: 36.1  HR: 60  RR: 17  BP: 141/75  SPO2%:   99   Measurements: HT(cm): 167.4  WT(kg): 73.3  BSA: 1.84   BMI:  26.1      Physical Exam:      Constitutional: Well developed, awake/alert/oriented  x3, no distress, alert and cooperative   Eyes: PERRL, EOMI, clear sclera   ENMT: mucous membranes moist, no apparent injury,  no lesions seen   Head/Neck: Neck supple, no apparent injury, thyroid  without mass or tenderness, No JVD, trachea midline, no bruits   Respiratory/Thorax: Patent airways, CTAB, normal  breath sounds with good chest expansion, thorax symmetric   Cardiovascular: Regular, rate and rhythm, no murmurs,  2+ equal pulses of the extremities, normal S 1and S 2   Gastrointestinal: Nondistended, soft, non-tender,  no rebound tenderness or guarding, no masses palpable, no organomegaly, +BS, no bruits   Genitourinary: No Discharge, vesicles or other abnormalities   Musculoskeletal: ROM reduced right UE 2/2/ torn rotator  cuff, no joint swelling, normal strength   Extremities: normal extremities, no cyanosis edema,  contusions or wounds, no clubbing   Neurological: alert and oriented x3, intact senses,  motor, response and reflexes, normal strength   Breast: No masses, tenderness, no discharge or discoloration   Lymphatic: No significant lymphadenopathy   Psychological: Appropriate mood and behavior   Skin: Warm and dry, no lesions, no rashes         Lab Results:     ·  Results        CBC date/time       WBC     HGB     HCT     PLT     Neut      20-Jul-2023 13:18   6.2     10.4(L) 31.2(L) 148(L)  3.83     BMP date/time       NA              K               CL              CO2             BUN             CREAT             20-Jul-2023 13:18   138             4.1             106             N/A              23              1.19     LDH date/time       LDH     20-Jul-2023 13:18   163     Assessment and Plan:      Assessment and Plan:   Assessment:    Patient is with newly diagnosed Multiple myeloma IgG kappa. He is here for further discussion re; Auto SCT after induction therapy.  Plan:    Multiple Myeloma: IgG kappa. ISS stage II  - Bone marrow 30-40% kappa restricted plasma cells  - Fish negative   - CT/PET: negative  - b2M 3.8   - normal Ca++, Cr, LDH, alb  - VRd started in 3/2023 then switched to D-Kd in late May 23. tolerating chemo well.  - M protein 3.8>3.1>3.0>2.9>2.8>2.1>1.7>1.3  - IgG 6072>>4098>2480>>2211>1700  - Kappa LC 1944 mg/L>>920>685>435  - K/L ratio 589>438>>>457  - Partial response after ~ 8 months Rx  - Bone marrow 11/9/23  5-10% kappa restricted plasma cells.  - discussed autoSCT   - cont current chemo  - not meet criteria for CART-> proceed with AutoSCT ~ jan 2024.    Peripheral neuropathy  - bilateral LE's : on Gabapentin   - bilateral CTS-> s/p repair. still symptomatic        CTS bilateral     RTC 2 months.

## 2023-12-04 ENCOUNTER — APPOINTMENT (OUTPATIENT)
Dept: RADIOLOGY | Facility: HOSPITAL | Age: 78
End: 2023-12-04
Payer: MEDICARE

## 2023-12-04 ENCOUNTER — APPOINTMENT (OUTPATIENT)
Dept: HEMATOLOGY/ONCOLOGY | Facility: HOSPITAL | Age: 78
End: 2023-12-04
Payer: MEDICARE

## 2023-12-05 ENCOUNTER — SOCIAL WORK (OUTPATIENT)
Dept: HEMATOLOGY/ONCOLOGY | Facility: HOSPITAL | Age: 78
End: 2023-12-05
Payer: MEDICARE

## 2023-12-05 LAB
ALBUMIN: 3.7 G/DL (ref 3.4–5)
ALPHA 1 GLOBULIN: 0.3 G/DL (ref 0.2–0.6)
ALPHA 2 GLOBULIN: 0.7 G/DL (ref 0.4–1.1)
BETA GLOBULIN: 0.6 G/DL (ref 0.5–1.2)
GAMMA GLOBULIN: 1.4 G/DL (ref 0.5–1.4)
IMMUNOFIXATION COMMENT: ABNORMAL
M-PROTEIN 1: 1 G/DL
PATH REVIEW - SERUM IMMUNOFIXATION: ABNORMAL
PATH REVIEW-SERUM PROTEIN ELECTROPHORESIS: ABNORMAL
PROTEIN ELECTROPHORESIS COMMENT: ABNORMAL

## 2023-12-05 NOTE — PROGRESS NOTES
SW attempted to reach patient to schedule SW SCT assessment; received voicemail. Requested callback to schedule time to complete assessment. Awaiting callback.

## 2023-12-07 ENCOUNTER — HOSPITAL ENCOUNTER (OUTPATIENT)
Dept: RESPIRATORY THERAPY | Facility: HOSPITAL | Age: 78
Discharge: HOME | End: 2023-12-07
Payer: MEDICARE

## 2023-12-07 ENCOUNTER — SOCIAL WORK (OUTPATIENT)
Dept: HEMATOLOGY/ONCOLOGY | Facility: HOSPITAL | Age: 78
End: 2023-12-07
Payer: MEDICARE

## 2023-12-07 ENCOUNTER — APPOINTMENT (OUTPATIENT)
Dept: OTHER | Facility: HOSPITAL | Age: 78
End: 2023-12-07
Payer: MEDICARE

## 2023-12-07 DIAGNOSIS — C90.00 MULTIPLE MYELOMA NOT HAVING ACHIEVED REMISSION (MULTI): ICD-10-CM

## 2023-12-07 PROCEDURE — 94010 BREATHING CAPACITY TEST: CPT | Performed by: INTERNAL MEDICINE

## 2023-12-07 PROCEDURE — 94726 PLETHYSMOGRAPHY LUNG VOLUMES: CPT | Performed by: INTERNAL MEDICINE

## 2023-12-07 PROCEDURE — 94010 BREATHING CAPACITY TEST: CPT

## 2023-12-07 PROCEDURE — 94729 DIFFUSING CAPACITY: CPT | Performed by: INTERNAL MEDICINE

## 2023-12-07 NOTE — PROGRESS NOTES
MELODY was planning to meet with patient on this day, following his tour of [facility] with Nurse Coordinator. Unfortunately, patient left due to being upset with confusion regarding testing prior to SW being able to meet with him.    Attempted to call patient following his leaving [facility], but received voicemail. Left voicemail, requesting callback to complete psychosocial assessment as soon as able. Awaiting callback.

## 2023-12-08 LAB
MGC ASCENT PFT - FEV1 - PRE: 3.09
MGC ASCENT PFT - FEV1 - PREDICTED: 2.41
MGC ASCENT PFT - FVC - PRE: 3.92
MGC ASCENT PFT - FVC - PREDICTED: 3.19

## 2023-12-11 ENCOUNTER — SOCIAL WORK (OUTPATIENT)
Dept: HEMATOLOGY/ONCOLOGY | Facility: HOSPITAL | Age: 78
End: 2023-12-11
Payer: MEDICARE
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
## 2023-12-27 DIAGNOSIS — C90.00 MULTIPLE MYELOMA NOT HAVING ACHIEVED REMISSION (MULTI): ICD-10-CM

## 2024-01-08 ENCOUNTER — DOCUMENTATION (OUTPATIENT)
Dept: OTHER | Facility: HOSPITAL | Age: 79
End: 2024-01-08
Payer: MEDICARE

## 2024-01-15 ENCOUNTER — SOCIAL WORK (OUTPATIENT)
Dept: HEMATOLOGY/ONCOLOGY | Facility: HOSPITAL | Age: 79
End: 2024-01-15
Payer: MEDICARE

## 2024-01-15 NOTE — PROGRESS NOTES
SW attempted to reach patient on this day via phone to complete psychosocial reassessment. No answer, received voicemail box. Left voicemail requesting callback. Awaiting callback to discuss.

## 2024-01-24 DIAGNOSIS — C90.00 MULTIPLE MYELOMA NOT HAVING ACHIEVED REMISSION (MULTI): ICD-10-CM

## 2024-01-24 DIAGNOSIS — Z51.81 ENCOUNTER FOR THERAPEUTIC DRUG LEVEL MONITORING: ICD-10-CM

## 2024-01-25 ENCOUNTER — HOSPITAL ENCOUNTER (OUTPATIENT)
Dept: RADIOLOGY | Facility: HOSPITAL | Age: 79
Discharge: HOME | End: 2024-01-25
Payer: MEDICARE

## 2024-01-25 ENCOUNTER — OFFICE VISIT (OUTPATIENT)
Dept: HEMATOLOGY/ONCOLOGY | Facility: HOSPITAL | Age: 79
End: 2024-01-25
Payer: MEDICARE

## 2024-01-25 ENCOUNTER — LAB (OUTPATIENT)
Dept: LAB | Facility: HOSPITAL | Age: 79
End: 2024-01-25
Payer: MEDICARE

## 2024-01-25 VITALS
DIASTOLIC BLOOD PRESSURE: 66 MMHG | TEMPERATURE: 97.3 F | RESPIRATION RATE: 17 BRPM | HEART RATE: 89 BPM | WEIGHT: 161.8 LBS | SYSTOLIC BLOOD PRESSURE: 140 MMHG | OXYGEN SATURATION: 100 % | BODY MASS INDEX: 25.61 KG/M2

## 2024-01-25 DIAGNOSIS — Z94.84 STEM CELLS TRANSPLANT STATUS (MULTI): ICD-10-CM

## 2024-01-25 DIAGNOSIS — C90.00 MULTIPLE MYELOMA NOT HAVING ACHIEVED REMISSION (MULTI): ICD-10-CM

## 2024-01-25 DIAGNOSIS — C90.00 MULTIPLE MYELOMA NOT HAVING ACHIEVED REMISSION (MULTI): Primary | ICD-10-CM

## 2024-01-25 DIAGNOSIS — Z51.81 ENCOUNTER FOR THERAPEUTIC DRUG LEVEL MONITORING: ICD-10-CM

## 2024-01-25 DIAGNOSIS — C90.00 IGG MULTIPLE MYELOMA (MULTI): ICD-10-CM

## 2024-01-25 LAB
ALBUMIN SERPL BCP-MCNC: 3.8 G/DL (ref 3.4–5)
ALP SERPL-CCNC: 57 U/L (ref 33–136)
ALT SERPL W P-5'-P-CCNC: 14 U/L (ref 10–52)
AMPHETAMINES UR QL SCN: NORMAL
ANION GAP SERPL CALC-SCNC: 10 MMOL/L (ref 10–20)
AST SERPL W P-5'-P-CCNC: 17 U/L (ref 9–39)
B2 MICROGLOB SERPL-MCNC: 3.5 MG/L (ref 0.7–2.2)
BARBITURATES UR QL SCN: NORMAL
BASOPHILS # BLD AUTO: 0.01 X10*3/UL (ref 0–0.1)
BASOPHILS NFR BLD AUTO: 0.2 %
BENZODIAZ UR QL SCN: NORMAL
BILIRUB SERPL-MCNC: 0.8 MG/DL (ref 0–1.2)
BUN SERPL-MCNC: 26 MG/DL (ref 6–23)
BZE UR QL SCN: NORMAL
CALCIUM SERPL-MCNC: 9.8 MG/DL (ref 8.6–10.3)
CANNABINOIDS UR QL SCN: NORMAL
CHLORIDE SERPL-SCNC: 110 MMOL/L (ref 98–107)
CMV IGG AVIDITY SERPL IA-RTO: REACTIVE %
CO2 SERPL-SCNC: 25 MMOL/L (ref 21–32)
CREAT SERPL-MCNC: 1.03 MG/DL (ref 0.5–1.3)
EBV EA IGG SER QL: NEGATIVE
EBV NA AB SER QL: NEGATIVE
EBV VCA IGG SER IA-ACNC: POSITIVE
EBV VCA IGM SER IA-ACNC: ABNORMAL
EGFRCR SERPLBLD CKD-EPI 2021: 75 ML/MIN/1.73M*2
EOSINOPHIL # BLD AUTO: 0.06 X10*3/UL (ref 0–0.4)
EOSINOPHIL NFR BLD AUTO: 1 %
ERYTHROCYTE [DISTWIDTH] IN BLOOD BY AUTOMATED COUNT: 13.6 % (ref 11.5–14.5)
FENTANYL+NORFENTANYL UR QL SCN: NORMAL
GLUCOSE SERPL-MCNC: 89 MG/DL (ref 74–99)
HBV CORE AB SER QL: NONREACTIVE
HBV CORE IGM SER QL: NONREACTIVE
HBV SURFACE AB SER-ACNC: <3.1 MIU/ML
HBV SURFACE AG SERPL QL IA: NONREACTIVE
HCT VFR BLD AUTO: 33.6 % (ref 41–52)
HCV AB SER QL: NONREACTIVE
HERPES SIMPLEX VIRUS 1 IGG: <0.2 INDEX
HERPES SIMPLEX VIRUS 2 IGG: <0.2 INDEX
HGB BLD-MCNC: 11.2 G/DL (ref 13.5–17.5)
HIV 1+2 AB+HIV1 P24 AG SERPL QL IA: NONREACTIVE
IMM GRANULOCYTES # BLD AUTO: 0.29 X10*3/UL (ref 0–0.5)
IMM GRANULOCYTES NFR BLD AUTO: 5 % (ref 0–0.9)
LYMPHOCYTES # BLD AUTO: 1.31 X10*3/UL (ref 0.8–3)
LYMPHOCYTES NFR BLD AUTO: 22.8 %
MAGNESIUM SERPL-MCNC: 2.05 MG/DL (ref 1.6–2.4)
MCH RBC QN AUTO: 34.1 PG (ref 26–34)
MCHC RBC AUTO-ENTMCNC: 33.3 G/DL (ref 32–36)
MCV RBC AUTO: 102 FL (ref 80–100)
MONOCYTES # BLD AUTO: 0.72 X10*3/UL (ref 0.05–0.8)
MONOCYTES NFR BLD AUTO: 12.5 %
NEUTROPHILS # BLD AUTO: 3.36 X10*3/UL (ref 1.6–5.5)
NEUTROPHILS NFR BLD AUTO: 58.5 %
NRBC BLD-RTO: 1.2 /100 WBCS (ref 0–0)
OPIATES UR QL SCN: NORMAL
OXYCODONE+OXYMORPHONE UR QL SCN: NORMAL
PCP UR QL SCN: NORMAL
PLATELET # BLD AUTO: 109 X10*3/UL (ref 150–450)
POTASSIUM SERPL-SCNC: 4.5 MMOL/L (ref 3.5–5.3)
PROT SERPL-MCNC: 6.5 G/DL (ref 6.4–8.2)
RBC # BLD AUTO: 3.28 X10*6/UL (ref 4.5–5.9)
SARS-COV-2 RNA RESP QL NAA+PROBE: NOT DETECTED
SODIUM SERPL-SCNC: 140 MMOL/L (ref 136–145)
T GONDII IGG SER-ACNC: NONREACTIVE
TREPONEMA PALLIDUM IGG+IGM AB [PRESENCE] IN SERUM OR PLASMA BY IMMUNOASSAY: NONREACTIVE
URATE SERPL-MCNC: 7.1 MG/DL (ref 4–7.5)
VARICELLA ZOSTER IGG INDEX: 0.6 IA
VZV IGG SER QL IA: NEGATIVE
WBC # BLD AUTO: 5.8 X10*3/UL (ref 4.4–11.3)

## 2024-01-25 PROCEDURE — 84550 ASSAY OF BLOOD/URIC ACID: CPT

## 2024-01-25 PROCEDURE — 36415 COLL VENOUS BLD VENIPUNCTURE: CPT

## 2024-01-25 PROCEDURE — 71046 X-RAY EXAM CHEST 2 VIEWS: CPT | Performed by: RADIOLOGY

## 2024-01-25 PROCEDURE — 86334 IMMUNOFIX E-PHORESIS SERUM: CPT

## 2024-01-25 PROCEDURE — 86706 HEP B SURFACE ANTIBODY: CPT

## 2024-01-25 PROCEDURE — 83021 HEMOGLOBIN CHROMOTOGRAPHY: CPT

## 2024-01-25 PROCEDURE — 80053 COMPREHEN METABOLIC PANEL: CPT

## 2024-01-25 PROCEDURE — 86320 SERUM IMMUNOELECTROPHORESIS: CPT | Performed by: STUDENT IN AN ORGANIZED HEALTH CARE EDUCATION/TRAINING PROGRAM

## 2024-01-25 PROCEDURE — 83521 IG LIGHT CHAINS FREE EACH: CPT

## 2024-01-25 PROCEDURE — 86787 VARICELLA-ZOSTER ANTIBODY: CPT

## 2024-01-25 PROCEDURE — 87340 HEPATITIS B SURFACE AG IA: CPT

## 2024-01-25 PROCEDURE — 1159F MED LIST DOCD IN RCRD: CPT | Performed by: INTERNAL MEDICINE

## 2024-01-25 PROCEDURE — 83020 HEMOGLOBIN ELECTROPHORESIS: CPT | Performed by: PATHOLOGY

## 2024-01-25 PROCEDURE — 86777 TOXOPLASMA ANTIBODY: CPT

## 2024-01-25 PROCEDURE — 87635 SARS-COV-2 COVID-19 AMP PRB: CPT

## 2024-01-25 PROCEDURE — 86077 PHYS BLOOD BANK SERV XMATCH: CPT | Performed by: PATHOLOGY

## 2024-01-25 PROCEDURE — 99215 OFFICE O/P EST HI 40 MIN: CPT | Performed by: INTERNAL MEDICINE

## 2024-01-25 PROCEDURE — 86790 VIRUS ANTIBODY NOS: CPT

## 2024-01-25 PROCEDURE — 86780 TREPONEMA PALLIDUM: CPT

## 2024-01-25 PROCEDURE — 80307 DRUG TEST PRSMV CHEM ANLYZR: CPT

## 2024-01-25 PROCEDURE — 86695 HERPES SIMPLEX TYPE 1 TEST: CPT

## 2024-01-25 PROCEDURE — 86644 CMV ANTIBODY: CPT

## 2024-01-25 PROCEDURE — 87389 HIV-1 AG W/HIV-1&-2 AB AG IA: CPT

## 2024-01-25 PROCEDURE — 1036F TOBACCO NON-USER: CPT | Performed by: INTERNAL MEDICINE

## 2024-01-25 PROCEDURE — 86901 BLOOD TYPING SEROLOGIC RH(D): CPT

## 2024-01-25 PROCEDURE — 1126F AMNT PAIN NOTED NONE PRSNT: CPT | Performed by: INTERNAL MEDICINE

## 2024-01-25 PROCEDURE — 86803 HEPATITIS C AB TEST: CPT

## 2024-01-25 PROCEDURE — 86705 HEP B CORE ANTIBODY IGM: CPT

## 2024-01-25 PROCEDURE — 86663 EPSTEIN-BARR ANTIBODY: CPT

## 2024-01-25 PROCEDURE — 84165 PROTEIN E-PHORESIS SERUM: CPT | Performed by: STUDENT IN AN ORGANIZED HEALTH CARE EDUCATION/TRAINING PROGRAM

## 2024-01-25 PROCEDURE — 82232 ASSAY OF BETA-2 PROTEIN: CPT

## 2024-01-25 PROCEDURE — 85025 COMPLETE CBC W/AUTO DIFF WBC: CPT

## 2024-01-25 PROCEDURE — 82784 ASSAY IGA/IGD/IGG/IGM EACH: CPT

## 2024-01-25 PROCEDURE — 86870 RBC ANTIBODY IDENTIFICATION: CPT

## 2024-01-25 PROCEDURE — 83735 ASSAY OF MAGNESIUM: CPT

## 2024-01-25 PROCEDURE — 86704 HEP B CORE ANTIBODY TOTAL: CPT

## 2024-01-25 PROCEDURE — 86645 CMV ANTIBODY IGM: CPT

## 2024-01-25 PROCEDURE — 71046 X-RAY EXAM CHEST 2 VIEWS: CPT

## 2024-01-25 RX ORDER — FAMOTIDINE 10 MG/ML
20 INJECTION INTRAVENOUS ONCE AS NEEDED
Status: CANCELLED | OUTPATIENT
Start: 2024-01-26

## 2024-01-25 RX ORDER — ALBUTEROL SULFATE 0.83 MG/ML
3 SOLUTION RESPIRATORY (INHALATION) AS NEEDED
Status: CANCELLED | OUTPATIENT
Start: 2024-01-30

## 2024-01-25 RX ORDER — LOPERAMIDE HYDROCHLORIDE 2 MG/1
2 CAPSULE ORAL ONCE AS NEEDED
Status: CANCELLED | OUTPATIENT
Start: 2024-01-26

## 2024-01-25 RX ORDER — ALBUTEROL SULFATE 0.83 MG/ML
3 SOLUTION RESPIRATORY (INHALATION) AS NEEDED
Status: CANCELLED | OUTPATIENT
Start: 2024-01-26

## 2024-01-25 RX ORDER — LANOLIN ALCOHOL/MO/W.PET/CERES
400 CREAM (GRAM) TOPICAL ONCE AS NEEDED
Status: CANCELLED | OUTPATIENT
Start: 2024-01-30

## 2024-01-25 RX ORDER — CALCIUM CARBONATE 200(500)MG
2 TABLET,CHEWABLE ORAL EVERY 5 MIN PRN
Status: CANCELLED | OUTPATIENT
Start: 2024-01-30

## 2024-01-25 RX ORDER — MAGNESIUM SULFATE HEPTAHYDRATE 40 MG/ML
4 INJECTION, SOLUTION INTRAVENOUS ONCE AS NEEDED
Status: CANCELLED | OUTPATIENT
Start: 2024-01-30

## 2024-01-25 RX ORDER — FAMOTIDINE 10 MG/ML
20 INJECTION INTRAVENOUS ONCE AS NEEDED
Status: CANCELLED | OUTPATIENT
Start: 2024-01-30

## 2024-01-25 RX ORDER — PLERIXAFOR 24 MG/1.2ML
24 SOLUTION SUBCUTANEOUS ONCE
Status: CANCELLED | OUTPATIENT
Start: 2024-01-26

## 2024-01-25 RX ORDER — POTASSIUM CHLORIDE 750 MG/1
40 TABLET, FILM COATED, EXTENDED RELEASE ORAL ONCE AS NEEDED
Status: CANCELLED | OUTPATIENT
Start: 2024-01-30

## 2024-01-25 RX ORDER — DIPHENHYDRAMINE HYDROCHLORIDE 50 MG/ML
50 INJECTION INTRAMUSCULAR; INTRAVENOUS AS NEEDED
Status: CANCELLED | OUTPATIENT
Start: 2024-01-26

## 2024-01-25 RX ORDER — EPINEPHRINE 0.3 MG/.3ML
0.3 INJECTION SUBCUTANEOUS EVERY 5 MIN PRN
Status: CANCELLED | OUTPATIENT
Start: 2024-01-26

## 2024-01-25 RX ORDER — EPINEPHRINE 0.3 MG/.3ML
0.3 INJECTION SUBCUTANEOUS EVERY 5 MIN PRN
Status: CANCELLED | OUTPATIENT
Start: 2024-01-30

## 2024-01-25 RX ORDER — CALCIUM CARBONATE 300MG(750)
400 TABLET,CHEWABLE ORAL DAILY
Qty: 10 TABLET | Refills: 0 | Status: CANCELLED | OUTPATIENT
Start: 2024-01-25

## 2024-01-25 RX ORDER — MAGNESIUM SULFATE HEPTAHYDRATE 40 MG/ML
2 INJECTION, SOLUTION INTRAVENOUS ONCE AS NEEDED
Status: CANCELLED | OUTPATIENT
Start: 2024-01-30

## 2024-01-25 RX ORDER — DIPHENHYDRAMINE HYDROCHLORIDE 50 MG/ML
50 INJECTION INTRAMUSCULAR; INTRAVENOUS AS NEEDED
Status: CANCELLED | OUTPATIENT
Start: 2024-01-30

## 2024-01-25 ASSESSMENT — ENCOUNTER SYMPTOMS
OCCASIONAL FEELINGS OF UNSTEADINESS: 0
LOSS OF SENSATION IN FEET: 0
DEPRESSION: 0

## 2024-01-25 ASSESSMENT — PAIN SCALES - GENERAL: PAINLEVEL: 0-NO PAIN

## 2024-01-26 ENCOUNTER — TREATMENT (OUTPATIENT)
Dept: OTHER | Facility: HOSPITAL | Age: 79
End: 2024-01-26
Payer: MEDICARE

## 2024-01-26 ENCOUNTER — SOCIAL WORK (OUTPATIENT)
Dept: HEMATOLOGY/ONCOLOGY | Facility: HOSPITAL | Age: 79
End: 2024-01-26

## 2024-01-26 ENCOUNTER — DOCUMENTATION (OUTPATIENT)
Dept: OTHER | Facility: HOSPITAL | Age: 79
End: 2024-01-26
Payer: MEDICARE

## 2024-01-26 VITALS
HEART RATE: 73 BPM | DIASTOLIC BLOOD PRESSURE: 83 MMHG | RESPIRATION RATE: 18 BRPM | SYSTOLIC BLOOD PRESSURE: 155 MMHG | BODY MASS INDEX: 27.04 KG/M2 | WEIGHT: 170.86 LBS | TEMPERATURE: 97.2 F | OXYGEN SATURATION: 100 %

## 2024-01-26 DIAGNOSIS — C90.00 MULTIPLE MYELOMA NOT HAVING ACHIEVED REMISSION (MULTI): ICD-10-CM

## 2024-01-26 DIAGNOSIS — C90.00 IGG MULTIPLE MYELOMA (MULTI): ICD-10-CM

## 2024-01-26 LAB
ABO GROUP (TYPE) IN BLOOD: NORMAL
ANTIBODY SCREEN: NORMAL
HEMOGLOBIN A2: 2.8 % (ref 2–3.5)
HEMOGLOBIN A: 95.7 % (ref 95.8–98)
HEMOGLOBIN F: 1.5 % (ref 0–2)
HEMOGLOBIN IDENTIFICATION INTERPRETATION: NORMAL
IGA SERPL-MCNC: <7 MG/DL (ref 70–400)
IGG SERPL-MCNC: 1480 MG/DL (ref 700–1600)
IGM SERPL-MCNC: <5 MG/DL (ref 40–230)
PATH REVIEW-HGB IDENTIFICATION: ABNORMAL
PROT SERPL-MCNC: 6.5 G/DL (ref 6.4–8.2)
RH FACTOR (ANTIGEN D): NORMAL

## 2024-01-26 PROCEDURE — 2500000004 HC RX 250 GENERAL PHARMACY W/ HCPCS (ALT 636 FOR OP/ED): Mod: JZ | Performed by: INTERNAL MEDICINE

## 2024-01-26 PROCEDURE — 96372 THER/PROPH/DIAG INJ SC/IM: CPT | Performed by: INTERNAL MEDICINE

## 2024-01-26 RX ORDER — FAMOTIDINE 10 MG/ML
20 INJECTION INTRAVENOUS ONCE AS NEEDED
Status: CANCELLED | OUTPATIENT
Start: 2024-01-27

## 2024-01-26 RX ORDER — ALBUTEROL SULFATE 0.83 MG/ML
3 SOLUTION RESPIRATORY (INHALATION) AS NEEDED
Status: CANCELLED | OUTPATIENT
Start: 2024-01-27

## 2024-01-26 RX ORDER — EPINEPHRINE 0.3 MG/.3ML
0.3 INJECTION SUBCUTANEOUS EVERY 5 MIN PRN
Status: CANCELLED | OUTPATIENT
Start: 2024-01-27

## 2024-01-26 RX ORDER — LOPERAMIDE HYDROCHLORIDE 2 MG/1
2 CAPSULE ORAL ONCE AS NEEDED
Status: CANCELLED | OUTPATIENT
Start: 2024-01-27

## 2024-01-26 RX ORDER — PLERIXAFOR 24 MG/1.2ML
24 SOLUTION SUBCUTANEOUS ONCE
Status: CANCELLED | OUTPATIENT
Start: 2024-01-27

## 2024-01-26 RX ORDER — DIPHENHYDRAMINE HYDROCHLORIDE 50 MG/ML
50 INJECTION INTRAMUSCULAR; INTRAVENOUS AS NEEDED
Status: CANCELLED | OUTPATIENT
Start: 2024-01-27

## 2024-01-26 RX ADMIN — FILGRASTIM-SNDZ 780 MCG: 480 INJECTION, SOLUTION INTRAVENOUS; SUBCUTANEOUS at 08:04

## 2024-01-26 ASSESSMENT — PAIN - FUNCTIONAL ASSESSMENT: PAIN_FUNCTIONAL_ASSESSMENT: 0-10

## 2024-01-26 ASSESSMENT — PAIN SCALES - GENERAL: PAINLEVEL_OUTOF10: 0 - NO PAIN

## 2024-01-26 NOTE — PROGRESS NOTES
SW has attempted to reach patient via phone to complete 30 Day reassessment; received patient's voicemail (x5).  However, per input from BMT team, patient's psychosocial plan remains unchanged per their conversations with him. He therefore, remains an appropriate candidate for transplant from a psychosocial perspective.

## 2024-01-26 NOTE — PROGRESS NOTES
1/25/2023 1:00pm    SCT coordinator met with patient and Dr. Contreras to complete transplant and mobilization and collection consents. Dr. Link also did a medical evaluation of patient and answered all patient questions regarding the mobilization/collection and transplant process. SCT coordinator also took patient to the 3rd floor so he could see a room before admission. Patient verbalized understanding the procedures and was given relevant contact information and told to reach out with any additional questions or concerns.     Gertrudis Olivarez RN

## 2024-01-26 NOTE — PROGRESS NOTES
Pt presents to ASCTU via self-ambulation for filgrastim injection 780 mcg given in the R arm. Tolerated well. Pt left via self-ambulation.

## 2024-01-27 ENCOUNTER — INFUSION (OUTPATIENT)
Dept: HEMATOLOGY/ONCOLOGY | Facility: HOSPITAL | Age: 79
End: 2024-01-27
Payer: MEDICARE

## 2024-01-27 VITALS
SYSTOLIC BLOOD PRESSURE: 145 MMHG | OXYGEN SATURATION: 99 % | BODY MASS INDEX: 26.93 KG/M2 | WEIGHT: 170.19 LBS | TEMPERATURE: 97.9 F | HEART RATE: 79 BPM | DIASTOLIC BLOOD PRESSURE: 74 MMHG | RESPIRATION RATE: 16 BRPM

## 2024-01-27 DIAGNOSIS — C90.00 IGG MULTIPLE MYELOMA (MULTI): ICD-10-CM

## 2024-01-27 DIAGNOSIS — C90.00 MULTIPLE MYELOMA NOT HAVING ACHIEVED REMISSION (MULTI): ICD-10-CM

## 2024-01-27 LAB — HTLV I+II AB SER QL IA: NEGATIVE

## 2024-01-27 PROCEDURE — 96372 THER/PROPH/DIAG INJ SC/IM: CPT | Performed by: INTERNAL MEDICINE

## 2024-01-27 PROCEDURE — 2500000004 HC RX 250 GENERAL PHARMACY W/ HCPCS (ALT 636 FOR OP/ED): Mod: JZ | Performed by: INTERNAL MEDICINE

## 2024-01-27 PROCEDURE — 96372 THER/PROPH/DIAG INJ SC/IM: CPT

## 2024-01-27 RX ORDER — DIPHENHYDRAMINE HYDROCHLORIDE 50 MG/ML
50 INJECTION INTRAMUSCULAR; INTRAVENOUS AS NEEDED
Status: CANCELLED | OUTPATIENT
Start: 2024-01-28

## 2024-01-27 RX ORDER — FAMOTIDINE 10 MG/ML
20 INJECTION INTRAVENOUS ONCE AS NEEDED
Status: CANCELLED | OUTPATIENT
Start: 2024-01-28

## 2024-01-27 RX ORDER — ALBUTEROL SULFATE 0.83 MG/ML
3 SOLUTION RESPIRATORY (INHALATION) AS NEEDED
Status: CANCELLED | OUTPATIENT
Start: 2024-01-28

## 2024-01-27 RX ORDER — EPINEPHRINE 0.3 MG/.3ML
0.3 INJECTION SUBCUTANEOUS EVERY 5 MIN PRN
Status: CANCELLED | OUTPATIENT
Start: 2024-01-28

## 2024-01-27 RX ORDER — LOPERAMIDE HYDROCHLORIDE 2 MG/1
2 CAPSULE ORAL ONCE AS NEEDED
Status: CANCELLED | OUTPATIENT
Start: 2024-01-28

## 2024-01-27 RX ORDER — PLERIXAFOR 24 MG/1.2ML
24 SOLUTION SUBCUTANEOUS ONCE
Status: CANCELLED | OUTPATIENT
Start: 2024-01-28

## 2024-01-27 RX ADMIN — FILGRASTIM-SNDZ 780 MCG: 480 INJECTION, SOLUTION INTRAVENOUS; SUBCUTANEOUS at 08:48

## 2024-01-27 ASSESSMENT — PAIN SCALES - GENERAL: PAINLEVEL: 2

## 2024-01-28 ENCOUNTER — INFUSION (OUTPATIENT)
Dept: HEMATOLOGY/ONCOLOGY | Facility: HOSPITAL | Age: 79
End: 2024-01-28
Payer: MEDICARE

## 2024-01-28 VITALS
SYSTOLIC BLOOD PRESSURE: 142 MMHG | RESPIRATION RATE: 18 BRPM | DIASTOLIC BLOOD PRESSURE: 71 MMHG | WEIGHT: 170.86 LBS | TEMPERATURE: 97.5 F | OXYGEN SATURATION: 100 % | BODY MASS INDEX: 27.04 KG/M2 | HEART RATE: 75 BPM

## 2024-01-28 DIAGNOSIS — C90.00 MULTIPLE MYELOMA NOT HAVING ACHIEVED REMISSION (MULTI): ICD-10-CM

## 2024-01-28 DIAGNOSIS — C90.00 IGG MULTIPLE MYELOMA (MULTI): ICD-10-CM

## 2024-01-28 LAB
CMV IGM SERPL-ACNC: <8 AU/ML
KAPPA LC SERPL-MCNC: 27.43 MG/DL (ref 0.33–1.94)
KAPPA LC/LAMBDA SER: ABNORMAL {RATIO}
LAMBDA LC SERPL-MCNC: <0.17 MG/DL (ref 0.57–2.63)

## 2024-01-28 PROCEDURE — 96372 THER/PROPH/DIAG INJ SC/IM: CPT | Performed by: INTERNAL MEDICINE

## 2024-01-28 PROCEDURE — 2500000004 HC RX 250 GENERAL PHARMACY W/ HCPCS (ALT 636 FOR OP/ED): Mod: JZ | Performed by: INTERNAL MEDICINE

## 2024-01-28 PROCEDURE — 96372 THER/PROPH/DIAG INJ SC/IM: CPT

## 2024-01-28 RX ORDER — LOPERAMIDE HYDROCHLORIDE 2 MG/1
2 CAPSULE ORAL ONCE AS NEEDED
Status: CANCELLED | OUTPATIENT
Start: 2024-01-29

## 2024-01-28 RX ORDER — DIPHENHYDRAMINE HYDROCHLORIDE 50 MG/ML
50 INJECTION INTRAMUSCULAR; INTRAVENOUS AS NEEDED
Status: CANCELLED | OUTPATIENT
Start: 2024-01-29

## 2024-01-28 RX ORDER — PLERIXAFOR 24 MG/1.2ML
24 SOLUTION SUBCUTANEOUS ONCE
Status: CANCELLED | OUTPATIENT
Start: 2024-01-29

## 2024-01-28 RX ORDER — EPINEPHRINE 0.3 MG/.3ML
0.3 INJECTION SUBCUTANEOUS EVERY 5 MIN PRN
Status: CANCELLED | OUTPATIENT
Start: 2024-01-29

## 2024-01-28 RX ORDER — FAMOTIDINE 10 MG/ML
20 INJECTION INTRAVENOUS ONCE AS NEEDED
Status: CANCELLED | OUTPATIENT
Start: 2024-01-29

## 2024-01-28 RX ORDER — ALBUTEROL SULFATE 0.83 MG/ML
3 SOLUTION RESPIRATORY (INHALATION) AS NEEDED
Status: CANCELLED | OUTPATIENT
Start: 2024-01-29

## 2024-01-28 RX ADMIN — FILGRASTIM-SNDZ 780 MCG: 480 INJECTION, SOLUTION INTRAVENOUS; SUBCUTANEOUS at 08:37

## 2024-01-28 NOTE — PROGRESS NOTES
Cancer History:   Treatment Synopsis:    MM IgG kappa        History of Present Illness:      ID Statement:    JENIFER HART is a 77 year old Male        Chief Complaint: MM IgG kappa   Interval History:    Patient is with newly diagnosed Multiple myeloma IgG kappa. He is here for further discussion re: auto SCT as part of consolidation therapy for Multiple Myeloma.     Recent BW showed elevated protein-> further work up c/w Multiple Myeloma. Bone marrow Bx(1/27/23) 30-40% kappa restricted plasma cells FISH without high risk features M protein 3.8 g/dL  B2M 3.8 IgG 6072 free KLC 1944, LLC 3.3  Kappa/Lambda ratio 589   alb 4.0  CT/PET 1/30/23 showed no abnormal lesions.  Patient denies recent weight loss, night sweats, poor appetites. He c/o right shoulder pain 2/2 rotator cuff tear.  He also c/o numbing/tingling both hands 2/2 CTS and bilateral LE pain for which he takes Gabapentin daily.  5/25/23: tolerating VRd. here for follow up. Neuropathy not worse  7/20/23: tolerating new chemo D-Kd. No recent weight loss, night sweats, SOB  9/21/23: doing well. No new c/o  11/30/23: no new c/o except neuropathy(not worse) all work for auto SCT done except PFT.  1/25/24: has been tolerating chemo well. C/o neuropathy bilateral feet.      ROS: negative except HPI     PMHx: Peripheral neuropathy bilateral LE's 2016 on gabapentin   Rotator cuff tear right.  ~6 month ago   PSHx; CTS repair bilateral: right ~ 2 years ago, left 3-4 months ago     Fhx: mother with colon cancer at 74  Father with CAD at 59           Allergies and Intolerances:       Allergies:         No Known Allergies: Active     Outpatient Medication Profile:  * Patient Currently Takes Medications as of 21-Sep-2023 12:00 documented in Structured Notes         gabapentin 300 mg oral tablet : Last Dose Taken:           Melatonin 3 mg oral tablet: Last Dose Taken:  , 1 tab(s) orally once  a day (at bedtime)         capsaicin 0.1% topical cream: Last  Dose Taken:          Family History: No Family History items are recorded  in the problem list.      Social History:   Social Substance History:  ·  Smoking Status former smoker (1)   ·  Additional History     , 2 grown children 4 grand children(1)           Vitals and Measurements:   Vitals: Temp: 36.1  HR: 60  RR: 17  BP: 141/75  SPO2%:   99   Measurements: HT(cm): 167.4  WT(kg): 73.3  BSA: 1.84   BMI:  26.1      Physical Exam:      Constitutional: Well developed, awake/alert/oriented  x3, no distress, alert and cooperative   Eyes: PERRL, EOMI, clear sclera   ENMT: mucous membranes moist, no apparent injury,  no lesions seen   Head/Neck: Neck supple, no apparent injury, thyroid  without mass or tenderness, No JVD, trachea midline, no bruits   Respiratory/Thorax: Patent airways, CTAB, normal  breath sounds with good chest expansion, thorax symmetric   Cardiovascular: Regular, rate and rhythm, no murmurs,  2+ equal pulses of the extremities, normal S 1and S 2   Gastrointestinal: Nondistended, soft, non-tender,  no rebound tenderness or guarding, no masses palpable, no organomegaly, +BS, no bruits   Genitourinary: No Discharge, vesicles or other abnormalities   Musculoskeletal: ROM reduced right UE 2/2/ torn rotator  cuff, no joint swelling, normal strength   Extremities: normal extremities, no cyanosis edema,  contusions or wounds, no clubbing   Neurological: alert and oriented x3, intact senses,  motor, response and reflexes, normal strength   Breast: No masses, tenderness, no discharge or discoloration   Lymphatic: No significant lymphadenopathy   Psychological: Appropriate mood and behavior   Skin: Warm and dry, no lesions, no rashes         Assessment and Plan:      Assessment and Plan:   Assessment:    Patient is with newly diagnosed Multiple myeloma IgG kappa. He is here for further discussion re; Auto SCT after induction therapy.  Plan:    Multiple Myeloma: IgG kappa. ISS stage II  - Bone marrow 30-40%  kappa restricted plasma cells  - Fish negative   - CT/PET: negative  - b2M 3.8   - normal Ca++, Cr, LDH, alb  - VRd started in 3/2023 then switched to D-Kd in late May 23. tolerating chemo well.  - M protein 3.8>3.1>3.0>2.9>2.8>2.1>1.7>1.3  - IgG 6072>>4098>2480>>2211>1700>1480  - Kappa LC 1944 mg/L>>920>685>435  - K/L ratio 589>438>>>457  - Partial response after ~ 8 months Rx  - Bone marrow 11/9/23  5-10% kappa restricted plasma cells.  - discussed autoSCT   - cont current chemo  - not meet criteria for CART-> proceed with AutoSCT ~ jan 2024.  - consent obtained    Peripheral neuropathy  - bilateral LE's : on Gabapentin   - bilateral CTS-> s/p repair. still symptomatic        CTS bilateral

## 2024-01-29 ENCOUNTER — HOSPITAL ENCOUNTER (OUTPATIENT)
Dept: RADIOLOGY | Facility: HOSPITAL | Age: 79
Discharge: HOME | End: 2024-01-29
Payer: MEDICARE

## 2024-01-29 ENCOUNTER — TREATMENT (OUTPATIENT)
Dept: OTHER | Facility: HOSPITAL | Age: 79
End: 2024-01-29
Payer: MEDICARE

## 2024-01-29 VITALS
TEMPERATURE: 97.7 F | RESPIRATION RATE: 18 BRPM | DIASTOLIC BLOOD PRESSURE: 65 MMHG | BODY MASS INDEX: 26.93 KG/M2 | HEART RATE: 78 BPM | SYSTOLIC BLOOD PRESSURE: 133 MMHG | WEIGHT: 170.19 LBS | OXYGEN SATURATION: 98 %

## 2024-01-29 VITALS
RESPIRATION RATE: 16 BRPM | TEMPERATURE: 98.1 F | OXYGEN SATURATION: 97 % | SYSTOLIC BLOOD PRESSURE: 121 MMHG | DIASTOLIC BLOOD PRESSURE: 69 MMHG | HEART RATE: 75 BPM

## 2024-01-29 DIAGNOSIS — C90.00 IGG MULTIPLE MYELOMA (MULTI): ICD-10-CM

## 2024-01-29 DIAGNOSIS — C90.00 MULTIPLE MYELOMA NOT HAVING ACHIEVED REMISSION (MULTI): ICD-10-CM

## 2024-01-29 LAB
ABO GROUP (TYPE) IN BLOOD: NORMAL
ANTIBODY SCREEN: NORMAL
BB ANTIBODY IDENTIFICATION: NORMAL
CASE #: NORMAL
PATH REV-IMMUNOHEMATOLOGY-PR30: NORMAL
RH FACTOR (ANTIGEN D): NORMAL
SCAN RESULT: NORMAL

## 2024-01-29 PROCEDURE — 96372 THER/PROPH/DIAG INJ SC/IM: CPT | Mod: 59 | Performed by: INTERNAL MEDICINE

## 2024-01-29 PROCEDURE — 2500000004 HC RX 250 GENERAL PHARMACY W/ HCPCS (ALT 636 FOR OP/ED): Mod: JZ | Performed by: INTERNAL MEDICINE

## 2024-01-29 PROCEDURE — 99152 MOD SED SAME PHYS/QHP 5/>YRS: CPT

## 2024-01-29 PROCEDURE — 2780000003 HC OR 278 NO HCPCS

## 2024-01-29 PROCEDURE — 86880 COOMBS TEST DIRECT: CPT

## 2024-01-29 PROCEDURE — 77001 FLUOROGUIDE FOR VEIN DEVICE: CPT | Performed by: RADIOLOGY

## 2024-01-29 PROCEDURE — 2500000004 HC RX 250 GENERAL PHARMACY W/ HCPCS (ALT 636 FOR OP/ED): Performed by: RADIOLOGY

## 2024-01-29 PROCEDURE — 99153 MOD SED SAME PHYS/QHP EA: CPT

## 2024-01-29 PROCEDURE — 36556 INSERT NON-TUNNEL CV CATH: CPT | Performed by: RADIOLOGY

## 2024-01-29 PROCEDURE — 76937 US GUIDE VASCULAR ACCESS: CPT | Performed by: RADIOLOGY

## 2024-01-29 PROCEDURE — 86901 BLOOD TYPING SEROLOGIC RH(D): CPT

## 2024-01-29 PROCEDURE — 86971 RBC PRETX INCUBATJ W/ENZYMES: CPT

## 2024-01-29 PROCEDURE — C1752 CATH,HEMODIALYSIS,SHORT-TERM: HCPCS

## 2024-01-29 PROCEDURE — 86900 BLOOD TYPING SEROLOGIC ABO: CPT

## 2024-01-29 PROCEDURE — 86870 RBC ANTIBODY IDENTIFICATION: CPT

## 2024-01-29 RX ORDER — PLERIXAFOR 24 MG/1.2ML
24 SOLUTION SUBCUTANEOUS ONCE
Status: CANCELLED | OUTPATIENT
Start: 2024-01-30

## 2024-01-29 RX ORDER — LANOLIN ALCOHOL/MO/W.PET/CERES
400 CREAM (GRAM) TOPICAL DAILY
Qty: 10 TABLET | Refills: 0 | Status: ON HOLD | OUTPATIENT
Start: 2024-01-29 | End: 2024-02-05 | Stop reason: ALTCHOICE

## 2024-01-29 RX ORDER — PLERIXAFOR 24 MG/1.2ML
24 SOLUTION SUBCUTANEOUS ONCE
Status: COMPLETED | OUTPATIENT
Start: 2024-01-29 | End: 2024-01-29

## 2024-01-29 RX ORDER — DIPHENHYDRAMINE HYDROCHLORIDE 50 MG/ML
50 INJECTION INTRAMUSCULAR; INTRAVENOUS AS NEEDED
Status: CANCELLED | OUTPATIENT
Start: 2024-01-30

## 2024-01-29 RX ORDER — FAMOTIDINE 10 MG/ML
20 INJECTION INTRAVENOUS ONCE AS NEEDED
Status: CANCELLED | OUTPATIENT
Start: 2024-01-30

## 2024-01-29 RX ORDER — FENTANYL CITRATE 50 UG/ML
INJECTION, SOLUTION INTRAMUSCULAR; INTRAVENOUS
Status: COMPLETED | OUTPATIENT
Start: 2024-01-29 | End: 2024-01-29

## 2024-01-29 RX ORDER — LOPERAMIDE HYDROCHLORIDE 2 MG/1
2 CAPSULE ORAL ONCE AS NEEDED
Status: CANCELLED | OUTPATIENT
Start: 2024-01-30

## 2024-01-29 RX ORDER — EPINEPHRINE 0.3 MG/.3ML
0.3 INJECTION SUBCUTANEOUS EVERY 5 MIN PRN
Status: CANCELLED | OUTPATIENT
Start: 2024-01-30

## 2024-01-29 RX ORDER — ALBUTEROL SULFATE 0.83 MG/ML
3 SOLUTION RESPIRATORY (INHALATION) AS NEEDED
Status: CANCELLED | OUTPATIENT
Start: 2024-01-30

## 2024-01-29 RX ORDER — MIDAZOLAM HYDROCHLORIDE 1 MG/ML
INJECTION INTRAMUSCULAR; INTRAVENOUS
Status: COMPLETED | OUTPATIENT
Start: 2024-01-29 | End: 2024-01-29

## 2024-01-29 RX ADMIN — PLERIXAFOR 24 MG: 24 SOLUTION SUBCUTANEOUS at 16:22

## 2024-01-29 RX ADMIN — FENTANYL CITRATE 50 MCG: 50 INJECTION, SOLUTION INTRAMUSCULAR; INTRAVENOUS at 10:06

## 2024-01-29 RX ADMIN — MIDAZOLAM HYDROCHLORIDE 1 MG: 1 INJECTION, SOLUTION INTRAMUSCULAR; INTRAVENOUS at 10:06

## 2024-01-29 RX ADMIN — FILGRASTIM-SNDZ 780 MCG: 480 INJECTION, SOLUTION INTRAVENOUS; SUBCUTANEOUS at 07:59

## 2024-01-29 ASSESSMENT — PAIN SCALES - GENERAL
PAINLEVEL_OUTOF10: 0 - NO PAIN
PAINLEVEL: 0-NO PAIN
PAINLEVEL_OUTOF10: 0 - NO PAIN

## 2024-01-29 ASSESSMENT — PAIN - FUNCTIONAL ASSESSMENT
PAIN_FUNCTIONAL_ASSESSMENT: 0-10

## 2024-01-29 NOTE — Clinical Note
13Fr RIJ trialysis catheter placed. No ectopy visualized during placement. Patient received 1mg versed, 50mcg fentanyl IVP for sedation.

## 2024-01-29 NOTE — Clinical Note
13Fr RIJ trialysis catheter placed. No ectopy visualized during placement. Patient received 1mg versed, 50mcg fentanyl IVP for sedation. New catheter aspirated, flushed, heparin loaded, capped, locked, sutured in place and gauze and tegaderm applied. No visible bleeding or hematoma at catheter site. VSS throughout procedure.

## 2024-01-29 NOTE — PRE-PROCEDURE NOTE
Interventional Radiology Preprocedure Note    Procedure: IR Nontunneled Central Venous Catheter Placement     Indication for procedure: The encounter diagnosis was Multiple myeloma not having achieved remission (CMS/HCC).    Relevant review of systems: NA    Relevant Labs:   Lab Results   Component Value Date    CREATININE 1.03 01/25/2024    EGFR 75 01/25/2024    INR 1.1 11/07/2023    PROTIME 12.7 11/07/2023       Planned Sedation/Anesthesia: Moderate    Airway assessment: normal    Directed physical examination:    A&Ox3   Neck is without erythema or swelling   Normal rate of respirations     Mallampati: II (hard and soft palate, upper portion of tonsils anduvula visible)    ASA Score: ASA 2 - Patient with mild systemic disease with no functional limitations    Benefits, risks and alternatives of procedure and planned sedation have been discussed with the patient and/or their representative. All questions answered and they agree to proceed.    Fabiola Amezcua DO, PGY-2   Diagnostic Radiology   [facility]

## 2024-01-29 NOTE — PROGRESS NOTES
Pt presents to ASCTU via self-ambulation for filgrastim injection 780 mcg given in the left arm. Tolerated well. Pt left to IR via self-ambulation.

## 2024-01-29 NOTE — PROGRESS NOTES
Oncology Pharmacy Chemotherapy Education Note   David Ferreira is a 78 y.o. year old male patient with a diagnosis of MM scheduled to undergo autologous stem cell transplant on 2/7/2024. The patient will receive conditioning chemotherapy with melphalan 140 mg/m2 since he is > 65 years of age. The patient's primary transplant oncologist is Dr. Contreras. Patient presents to clinic today for evaluation during collection. Pharmacist was consulted to provide education regarding conditioning chemotherapy, which will be administered on 2/6/2024 and assist with transitions of care.    Chemotherapy Education: The chemotherapy regimen was discussed with the patient/family including treatment schedule, potential side effects, and management of side effects. Potential side effects discussed include: myelosuppression, infection, nausea/vomiting, diarrhea, alopecia, fatigue, mucositis, taste changes. Management techniques of these side effects were discussed. Supportive care medications were briefly discussed in terms of use and potential side effects. The patient will need anti-emetics at home.    Home Medications: Reviewed medication list. Drug interactions identified include: none. Patient currently does not take herbal supplements. Discussed that herbal supplements are not regulated by the FDA and thus have not been well studied to assess drug-drug, drug-food, drug-disease interactions.     Anti-Infective Prophylaxis: The patient will required the following anti-infective medications.     HSV Fungal Bacterial PJP   Acyclovir 400 mg tablets: take 1 tablet by mouth every 12 hours Fluconazole 200 mg tablets: take 2 tablets by mouth once daily Levofloxacin 500 mg tablets: take 1 tablet by mouth once daily Bactrim /160 mg tablets: take 1 tablet by mouth on Mondays, Wednesdays, and Fridays   Start on Day 0 and continue for 1 year  (Pt already on) Start on Day +1 and continue until engraftment Start once neutropenic and  continue until engraftment Start on day +30 and continue for 3-6 months after transplant     All questions were answered. Patient/family verbalized understanding of the plan of care and information provided. Will follow as necessary. Time spent with patient/family and/or coordinating care: 90 minutes.    Maritza Willoughby, PharmD, BCOP  Ambulatory Bone and Marrow Transplant Pharmacist

## 2024-01-29 NOTE — POST-PROCEDURE NOTE
Interventional Radiology Brief Postprocedure Note    Attending: Dr. Reyna    Assistant: None    Diagnosis: Multiple myeloma    Description of procedure: Technically successful ultrasound and fluoroscopy guided placement of a triple-lumen right internal jugular central line. Please refer to full radiology report for further details.      Anesthesia:  MAC    Complications: None    Estimated Blood Loss: minimal    Medications (Filter: Administrations occurring from 0951 to 1029 on 01/29/24) As of 01/29/24 1029      fentaNYL PF (Sublimaze) injection (mcg) Total dose:  50 mcg      Date/Time Rate/Dose/Volume Action       01/29/24  1006 50 mcg Given               midazolam (Versed) injection (mg) Total dose:  1 mg      Date/Time Rate/Dose/Volume Action       01/29/24  1006 1 mg Given                   No specimens collected      See detailed result report with images in PACS.    The patient tolerated the procedure well without incident or complication and is in stable condition.     Fabiola Amezcua DO, PGY-2   Diagnostic Radiology   [facility]

## 2024-01-29 NOTE — PROGRESS NOTES
Pt presents to ASCTU via self-ambulation for plerixafor injection  24 mg given in the right upper arm. Tolerated well. Pt left via self-ambulation.

## 2024-01-30 ENCOUNTER — TREATMENT (OUTPATIENT)
Dept: OTHER | Facility: HOSPITAL | Age: 79
End: 2024-01-30
Payer: MEDICARE

## 2024-01-30 ENCOUNTER — OFFICE VISIT (OUTPATIENT)
Dept: HEMATOLOGY/ONCOLOGY | Facility: HOSPITAL | Age: 79
End: 2024-01-30
Payer: MEDICARE

## 2024-01-30 VITALS
BODY MASS INDEX: 27 KG/M2 | OXYGEN SATURATION: 97 % | RESPIRATION RATE: 18 BRPM | HEART RATE: 88 BPM | SYSTOLIC BLOOD PRESSURE: 139 MMHG | DIASTOLIC BLOOD PRESSURE: 74 MMHG | WEIGHT: 170.64 LBS | TEMPERATURE: 99 F

## 2024-01-30 DIAGNOSIS — C90.00 IGG MULTIPLE MYELOMA (MULTI): ICD-10-CM

## 2024-01-30 DIAGNOSIS — Z94.84 STEM CELLS TRANSPLANT STATUS (MULTI): ICD-10-CM

## 2024-01-30 DIAGNOSIS — C90.00 MULTIPLE MYELOMA NOT HAVING ACHIEVED REMISSION (MULTI): ICD-10-CM

## 2024-01-30 DIAGNOSIS — Z94.84 STEM CELLS TRANSPLANT STATUS (MULTI): Primary | ICD-10-CM

## 2024-01-30 LAB
ABO GROUP (TYPE) IN BLOOD: NORMAL
ALBUMIN SERPL BCP-MCNC: 3.2 G/DL (ref 3.4–5)
ALBUMIN SERPL BCP-MCNC: 3.9 G/DL (ref 3.4–5)
ALBUMIN: 3.8 G/DL (ref 3.4–5)
ALP SERPL-CCNC: 192 U/L (ref 33–136)
ALP SERPL-CCNC: 221 U/L (ref 33–136)
ALPHA 1 GLOBULIN: 0.3 G/DL (ref 0.2–0.6)
ALPHA 2 GLOBULIN: 0.6 G/DL (ref 0.4–1.1)
ALT SERPL W P-5'-P-CCNC: 13 U/L (ref 10–52)
ALT SERPL W P-5'-P-CCNC: 14 U/L (ref 10–52)
ANION GAP SERPL CALC-SCNC: 12 MMOL/L (ref 10–20)
ANION GAP SERPL CALC-SCNC: 12 MMOL/L (ref 10–20)
AST SERPL W P-5'-P-CCNC: 23 U/L (ref 9–39)
AST SERPL W P-5'-P-CCNC: 24 U/L (ref 9–39)
AUTOMATED IMMATURE GRAN % COLLECTION: 12.4 %
AUTOMATED IMMATURE GRAN ABSOLUTE COLLECTION: 83.91 X10*3/UL
BASOPHIL % COLLECTION: 0 %
BASOPHIL ABSOLUTE COLLECTION: 0.1 X10*3/UL
BASOPHILS # BLD MANUAL: 0 X10*3/UL (ref 0–0.1)
BASOPHILS NFR BLD MANUAL: 0 %
BETA GLOBULIN: 0.5 G/DL (ref 0.5–1.2)
BILIRUB SERPL-MCNC: 0.4 MG/DL (ref 0–1.2)
BILIRUB SERPL-MCNC: 0.5 MG/DL (ref 0–1.2)
BUN SERPL-MCNC: 19 MG/DL (ref 6–23)
BUN SERPL-MCNC: 19 MG/DL (ref 6–23)
CA-I BLD-SCNC: 1.35 MMOL/L (ref 1.1–1.33)
CALCIUM SERPL-MCNC: 11 MG/DL (ref 8.6–10.3)
CALCIUM SERPL-MCNC: 9.9 MG/DL (ref 8.6–10.3)
CD34 CELLS # SPEC: NORMAL VIABLE CD34+/UL
CD34 CELLS NFR SPEC: 0.35 %
CHLORIDE SERPL-SCNC: 105 MMOL/L (ref 98–107)
CHLORIDE SERPL-SCNC: 109 MMOL/L (ref 98–107)
CO2 SERPL-SCNC: 26 MMOL/L (ref 21–32)
CO2 SERPL-SCNC: 31 MMOL/L (ref 21–32)
CREAT SERPL-MCNC: 0.96 MG/DL (ref 0.5–1.3)
CREAT SERPL-MCNC: 0.98 MG/DL (ref 0.5–1.3)
DOHLE BOD BLD QL SMEAR: PRESENT
EGFRCR SERPLBLD CKD-EPI 2021: 79 ML/MIN/1.73M*2
EGFRCR SERPLBLD CKD-EPI 2021: 81 ML/MIN/1.73M*2
EOSINOPHIL # BLD MANUAL: 0 X10*3/UL (ref 0–0.4)
EOSINOPHIL % COLLECTION: 0 %
EOSINOPHIL ABSOLUTE COLLECTION: 0 X10*3/UL
EOSINOPHIL NFR BLD MANUAL: 0 %
ERYTHROCYTE [DISTWIDTH] IN BLOOD BY AUTOMATED COUNT: 15.1 % (ref 11.5–14.5)
ERYTHROCYTE [DISTWIDTH] IN BLOOD BY AUTOMATED COUNT: 15.1 % (ref 11.5–14.5)
GAMMA GLOBULIN: 1.2 G/DL (ref 0.5–1.4)
GLUCOSE SERPL-MCNC: 103 MG/DL (ref 74–99)
GLUCOSE SERPL-MCNC: 106 MG/DL (ref 74–99)
HCT VFR BLD AUTO: 29.7 % (ref 41–52)
HCT VFR BLD AUTO: 32.6 % (ref 41–52)
HCT, COLLECTION: 3.6 %
HGB BLD-MCNC: 10.8 G/DL (ref 13.5–17.5)
HGB BLD-MCNC: 9.7 G/DL (ref 13.5–17.5)
HGB, COLLECTION: 1.6 G/DL
IMM GRANULOCYTES # BLD AUTO: 26.09 X10*3/UL (ref 0–0.5)
IMM GRANULOCYTES NFR BLD AUTO: 24.4 % (ref 0–0.9)
IMMUNOFIXATION COMMENT: ABNORMAL
LYMPHOCYTE % COLLECTION: 15.4 %
LYMPHOCYTE ABSOLUTE COLLECTION: 104.4 X10*3/UL
LYMPHOCYTES # BLD MANUAL: 8.57 X10*3/UL (ref 0.8–3)
LYMPHOCYTES NFR BLD MANUAL: 8 %
M-PROTEIN 1: 0.9 G/DL
MAGNESIUM SERPL-MCNC: 1.83 MG/DL (ref 1.6–2.4)
MAGNESIUM SERPL-MCNC: 1.93 MG/DL (ref 1.6–2.4)
MCH RBC QN AUTO: 34.9 PG (ref 26–34)
MCH RBC QN AUTO: 35.1 PG (ref 26–34)
MCHC RBC AUTO-ENTMCNC: 32.7 G/DL (ref 32–36)
MCHC RBC AUTO-ENTMCNC: 33.1 G/DL (ref 32–36)
MCV RBC AUTO: 106 FL (ref 80–100)
MCV RBC AUTO: 107 FL (ref 80–100)
METAMYELOCYTES # BLD MANUAL: 3.21 X10*3/UL
METAMYELOCYTES NFR BLD MANUAL: 3 %
MONOCYTE % COLLECTION: 25.1 %
MONOCYTE ABSOLUTE COLLECTION: 170.68 X10*3/UL
MONOCYTES # BLD MANUAL: 12.85 X10*3/UL (ref 0.05–0.8)
MONOCYTES NFR BLD MANUAL: 12 %
MYELOCYTES # BLD MANUAL: 3.21 X10*3/UL
MYELOCYTES NFR BLD MANUAL: 3 %
NEUTROPHIL % COLLECTION: 47.1 %
NEUTROPHIL ABSOLUTE COLLECTION: 320.14 X10*3/UL
NEUTROPHILS # BLD MANUAL: 79.26 X10*3/UL (ref 1.6–5.5)
NEUTS BAND # BLD MANUAL: 18.21 X10*3/UL (ref 0–0.5)
NEUTS BAND NFR BLD MANUAL: 17 %
NEUTS SEG # BLD MANUAL: 61.05 X10*3/UL (ref 1.6–5)
NEUTS SEG NFR BLD MANUAL: 57 %
NRBC BLD-RTO: 0.1 /100 WBCS (ref 0–0)
NRBC BLD-RTO: 0.2 /100 WBCS (ref 0–0)
NUCLEATED RBC, COLLECTION: 0.3 /100 WBCS
PATH REVIEW - SERUM IMMUNOFIXATION: ABNORMAL
PATH REVIEW-SERUM PROTEIN ELECTROPHORESIS: ABNORMAL
PLATELET # BLD AUTO: 122 X10*3/UL (ref 150–450)
PLATELET # BLD AUTO: 206 X10*3/UL (ref 150–450)
PLT, COLLECTION: 1486 X10*3/UL
POLYCHROMASIA BLD QL SMEAR: ABNORMAL
POTASSIUM SERPL-SCNC: 3.4 MMOL/L (ref 3.5–5.3)
POTASSIUM SERPL-SCNC: 3.8 MMOL/L (ref 3.5–5.3)
PROT SERPL-MCNC: 5.6 G/DL (ref 6.4–8.2)
PROT SERPL-MCNC: 6.7 G/DL (ref 6.4–8.2)
PROTEIN ELECTROPHORESIS COMMENT: ABNORMAL
RBC # BLD AUTO: 2.78 X10*6/UL (ref 4.5–5.9)
RBC # BLD AUTO: 3.08 X10*6/UL (ref 4.5–5.9)
RBC MORPH BLD: ABNORMAL
RBC, COLLECTION: 0.25 X10*6/UL
RH FACTOR (ANTIGEN D): NORMAL
SCHISTOCYTES BLD QL SMEAR: ABNORMAL
SODIUM SERPL-SCNC: 143 MMOL/L (ref 136–145)
SODIUM SERPL-SCNC: 145 MMOL/L (ref 136–145)
TOTAL CELLS COUNTED BLD: 100
VIABLE CELLS NFR SPEC: 97.7 %
WBC # BLD AUTO: 107.1 X10*3/UL (ref 4.4–11.3)
WBC # BLD AUTO: 81.1 X10*3/UL (ref 4.4–11.3)
WBC, COLLECTION: 679.2 X10*3/UL

## 2024-01-30 PROCEDURE — 36511 APHERESIS WBC: CPT

## 2024-01-30 PROCEDURE — 85007 BL SMEAR W/DIFF WBC COUNT: CPT

## 2024-01-30 PROCEDURE — 96366 THER/PROPH/DIAG IV INF ADDON: CPT

## 2024-01-30 PROCEDURE — 38207 CRYOPRESERVE STEM CELLS: CPT

## 2024-01-30 PROCEDURE — 96372 THER/PROPH/DIAG INJ SC/IM: CPT | Performed by: INTERNAL MEDICINE

## 2024-01-30 PROCEDURE — 2500000004 HC RX 250 GENERAL PHARMACY W/ HCPCS (ALT 636 FOR OP/ED): Performed by: INTERNAL MEDICINE

## 2024-01-30 PROCEDURE — 96372 THER/PROPH/DIAG INJ SC/IM: CPT | Mod: 59 | Performed by: INTERNAL MEDICINE

## 2024-01-30 PROCEDURE — 96365 THER/PROPH/DIAG IV INF INIT: CPT

## 2024-01-30 PROCEDURE — 82330 ASSAY OF CALCIUM: CPT

## 2024-01-30 PROCEDURE — 84075 ASSAY ALKALINE PHOSPHATASE: CPT

## 2024-01-30 PROCEDURE — 83735 ASSAY OF MAGNESIUM: CPT

## 2024-01-30 PROCEDURE — 38214 VOLUME DEPLETE OF HARVEST: CPT

## 2024-01-30 PROCEDURE — 2500000005 HC RX 250 GENERAL PHARMACY W/O HCPCS: Performed by: INTERNAL MEDICINE

## 2024-01-30 PROCEDURE — 88185 FLOWCYTOMETRY/TC ADD-ON: CPT | Mod: TC

## 2024-01-30 PROCEDURE — 2500000004 HC RX 250 GENERAL PHARMACY W/ HCPCS (ALT 636 FOR OP/ED): Mod: JZ | Performed by: INTERNAL MEDICINE

## 2024-01-30 PROCEDURE — 85027 COMPLETE CBC AUTOMATED: CPT

## 2024-01-30 RX ORDER — POTASSIUM CHLORIDE 20 MEQ/1
40 TABLET, EXTENDED RELEASE ORAL ONCE AS NEEDED
Status: CANCELLED | OUTPATIENT
Start: 2024-01-31

## 2024-01-30 RX ORDER — CALCIUM CARBONATE 200(500)MG
2 TABLET,CHEWABLE ORAL EVERY 5 MIN PRN
Status: CANCELLED | OUTPATIENT
Start: 2024-01-31

## 2024-01-30 RX ORDER — LANOLIN ALCOHOL/MO/W.PET/CERES
400 CREAM (GRAM) TOPICAL ONCE AS NEEDED
Status: CANCELLED | OUTPATIENT
Start: 2024-01-31

## 2024-01-30 RX ORDER — ALBUTEROL SULFATE 0.83 MG/ML
3 SOLUTION RESPIRATORY (INHALATION) AS NEEDED
Status: CANCELLED | OUTPATIENT
Start: 2024-01-31

## 2024-01-30 RX ORDER — MAGNESIUM SULFATE HEPTAHYDRATE 40 MG/ML
4 INJECTION, SOLUTION INTRAVENOUS ONCE AS NEEDED
Status: CANCELLED | OUTPATIENT
Start: 2024-01-31

## 2024-01-30 RX ORDER — FAMOTIDINE 10 MG/ML
20 INJECTION INTRAVENOUS ONCE AS NEEDED
Status: CANCELLED | OUTPATIENT
Start: 2024-01-31

## 2024-01-30 RX ORDER — HEPARIN 100 UNIT/ML
500 SYRINGE INTRAVENOUS AS NEEDED
Status: CANCELLED | OUTPATIENT
Start: 2024-01-30

## 2024-01-30 RX ORDER — EPINEPHRINE 0.3 MG/.3ML
0.3 INJECTION SUBCUTANEOUS EVERY 5 MIN PRN
Status: CANCELLED | OUTPATIENT
Start: 2024-01-31

## 2024-01-30 RX ORDER — MAGNESIUM SULFATE HEPTAHYDRATE 40 MG/ML
2 INJECTION, SOLUTION INTRAVENOUS ONCE AS NEEDED
Status: CANCELLED | OUTPATIENT
Start: 2024-01-31

## 2024-01-30 RX ORDER — DIPHENHYDRAMINE HYDROCHLORIDE 50 MG/ML
50 INJECTION INTRAMUSCULAR; INTRAVENOUS AS NEEDED
Status: CANCELLED | OUTPATIENT
Start: 2024-01-31

## 2024-01-30 RX ORDER — CALCIUM CARBONATE 200(500)MG
2 TABLET,CHEWABLE ORAL EVERY 5 MIN PRN
Status: DISCONTINUED | OUTPATIENT
Start: 2024-01-30 | End: 2024-01-31

## 2024-01-30 RX ORDER — PLERIXAFOR 24 MG/1.2ML
24 SOLUTION SUBCUTANEOUS ONCE
Status: CANCELLED | OUTPATIENT
Start: 2024-01-31

## 2024-01-30 RX ORDER — POTASSIUM CHLORIDE 29.8 MG/ML
40 INJECTION INTRAVENOUS ONCE AS NEEDED
Status: CANCELLED | OUTPATIENT
Start: 2024-01-31

## 2024-01-30 RX ORDER — POTASSIUM CHLORIDE 20 MEQ/1
40 TABLET, EXTENDED RELEASE ORAL ONCE AS NEEDED
Status: DISCONTINUED | OUTPATIENT
Start: 2024-01-30 | End: 2024-01-31

## 2024-01-30 RX ORDER — HEPARIN SODIUM,PORCINE/PF 10 UNIT/ML
50 SYRINGE (ML) INTRAVENOUS AS NEEDED
Status: CANCELLED | OUTPATIENT
Start: 2024-01-30

## 2024-01-30 RX ORDER — LOPERAMIDE HYDROCHLORIDE 2 MG/1
2 CAPSULE ORAL ONCE AS NEEDED
Status: CANCELLED | OUTPATIENT
Start: 2024-01-31

## 2024-01-30 RX ADMIN — POTASSIUM CHLORIDE 40 MEQ: 1500 TABLET, EXTENDED RELEASE ORAL at 16:32

## 2024-01-30 RX ADMIN — CALCIUM CHLORIDE 1.7 G: 100 INJECTION, SOLUTION INTRAVENOUS at 10:01

## 2024-01-30 RX ADMIN — FILGRASTIM-SNDZ 780 MCG: 480 INJECTION, SOLUTION INTRAVENOUS; SUBCUTANEOUS at 08:13

## 2024-01-30 NOTE — PROGRESS NOTES
Pt arrived to unit alert, oriented and ambulatory, accompanied by son. Vitals taken and labs drawn via apheresis catheter; okay to use line verified. Report was called to Dr. Marinelli and OK to Proceed given at 0940. Collection began at 1001 and continued with no acute issues reported or observed. At the end of collection, vitals were taken and labs were drawn from patient and product. Apheresis catheter was flushed with heparin and saline per orders and caps replaced. Pt received 40mEq of potassium for post collection serum potassium of 3.4. Post labs were reviewed with patient and (results/instructions). Pt confirmed understanding of all instructions and teaching and has no additional learning needs at this time. At 1730 pt was informed that the target was met and no further injections or days of collection were needed. Pt left the unit at 1740 alert, oriented and ambulatory, accompanied by son.

## 2024-01-31 ENCOUNTER — APPOINTMENT (OUTPATIENT)
Dept: OTHER | Facility: HOSPITAL | Age: 79
End: 2024-01-31
Payer: MEDICARE

## 2024-01-31 ENCOUNTER — LAB REQUISITION (OUTPATIENT)
Dept: LAB | Facility: HOSPITAL | Age: 79
End: 2024-01-31
Payer: MEDICARE

## 2024-01-31 DIAGNOSIS — C90.00 MULTIPLE MYELOMA NOT HAVING ACHIEVED REMISSION (MULTI): ICD-10-CM

## 2024-01-31 PROCEDURE — 87070 CULTURE OTHR SPECIMN AEROBIC: CPT

## 2024-01-31 PROCEDURE — 87070 CULTURE OTHR SPECIMN AEROBIC: CPT | Mod: OUT | Performed by: INTERNAL MEDICINE

## 2024-01-31 RX ORDER — GABAPENTIN 300 MG/1
CAPSULE ORAL
Qty: 120 CAPSULE | Refills: 2
Start: 2024-01-31

## 2024-01-31 RX ORDER — CAPSAICIN 0.03 G/100G
CREAM TOPICAL NIGHTLY PRN
Qty: 56.6 G | Refills: 2
Start: 2024-01-31

## 2024-01-31 RX ORDER — ACETAMINOPHEN, DIPHENHYDRAMINE HCL, PHENYLEPHRINE HCL 325; 25; 5 MG/1; MG/1; MG/1
10 TABLET ORAL NIGHTLY
Qty: 30 TABLET | Refills: 2
Start: 2024-01-31

## 2024-01-31 RX ORDER — ACYCLOVIR 400 MG/1
400 TABLET ORAL EVERY 12 HOURS
Qty: 60 TABLET | Refills: 2
Start: 2024-01-31

## 2024-02-01 ENCOUNTER — LAB (OUTPATIENT)
Dept: HEMATOLOGY/ONCOLOGY | Facility: HOSPITAL | Age: 79
End: 2024-02-01
Payer: MEDICARE

## 2024-02-01 ENCOUNTER — OFFICE VISIT (OUTPATIENT)
Dept: HEMATOLOGY/ONCOLOGY | Facility: HOSPITAL | Age: 79
End: 2024-02-01
Payer: MEDICARE

## 2024-02-01 ENCOUNTER — DOCUMENTATION (OUTPATIENT)
Dept: OTHER | Facility: HOSPITAL | Age: 79
End: 2024-02-01
Payer: MEDICARE

## 2024-02-01 VITALS
DIASTOLIC BLOOD PRESSURE: 79 MMHG | WEIGHT: 170.42 LBS | HEART RATE: 79 BPM | BODY MASS INDEX: 26.97 KG/M2 | RESPIRATION RATE: 16 BRPM | SYSTOLIC BLOOD PRESSURE: 148 MMHG | TEMPERATURE: 97.2 F | OXYGEN SATURATION: 96 %

## 2024-02-01 DIAGNOSIS — G62.9 PERIPHERAL POLYNEUROPATHY: ICD-10-CM

## 2024-02-01 DIAGNOSIS — C90.00 MULTIPLE MYELOMA NOT HAVING ACHIEVED REMISSION (MULTI): ICD-10-CM

## 2024-02-01 DIAGNOSIS — C90.00 MULTIPLE MYELOMA NOT HAVING ACHIEVED REMISSION (MULTI): Primary | ICD-10-CM

## 2024-02-01 DIAGNOSIS — Z94.84 STEM CELLS TRANSPLANT STATUS (MULTI): ICD-10-CM

## 2024-02-01 LAB
ALBUMIN SERPL BCP-MCNC: 3.5 G/DL (ref 3.4–5)
ALP SERPL-CCNC: 210 U/L (ref 33–136)
ALT SERPL W P-5'-P-CCNC: 24 U/L (ref 10–52)
ANION GAP SERPL CALC-SCNC: 11 MMOL/L (ref 10–20)
AST SERPL W P-5'-P-CCNC: 26 U/L (ref 9–39)
B2 MICROGLOB SERPL-MCNC: 4.1 MG/L (ref 0.7–2.2)
BASOPHILS # BLD MANUAL: 0 X10*3/UL (ref 0–0.1)
BASOPHILS NFR BLD MANUAL: 0 %
BILIRUB SERPL-MCNC: 0.5 MG/DL (ref 0–1.2)
BUN SERPL-MCNC: 18 MG/DL (ref 6–23)
CALCIUM SERPL-MCNC: 9.6 MG/DL (ref 8.6–10.3)
CHLORIDE SERPL-SCNC: 104 MMOL/L (ref 98–107)
CO2 SERPL-SCNC: 30 MMOL/L (ref 21–32)
CREAT SERPL-MCNC: 0.91 MG/DL (ref 0.5–1.3)
EGFRCR SERPLBLD CKD-EPI 2021: 87 ML/MIN/1.73M*2
EOSINOPHIL # BLD MANUAL: 0 X10*3/UL (ref 0–0.4)
EOSINOPHIL NFR BLD MANUAL: 0 %
ERYTHROCYTE [DISTWIDTH] IN BLOOD BY AUTOMATED COUNT: 14.9 % (ref 11.5–14.5)
GLUCOSE SERPL-MCNC: 129 MG/DL (ref 74–99)
HCT VFR BLD AUTO: 31.1 % (ref 41–52)
HGB BLD-MCNC: 9.9 G/DL (ref 13.5–17.5)
IMM GRANULOCYTES # BLD AUTO: 11.24 X10*3/UL (ref 0–0.5)
IMM GRANULOCYTES NFR BLD AUTO: 17.2 % (ref 0–0.9)
LYMPHOCYTES # BLD MANUAL: 1.3 X10*3/UL (ref 0.8–3)
LYMPHOCYTES NFR BLD MANUAL: 2 %
MAGNESIUM SERPL-MCNC: 1.94 MG/DL (ref 1.6–2.4)
MCH RBC QN AUTO: 33.9 PG (ref 26–34)
MCHC RBC AUTO-ENTMCNC: 31.8 G/DL (ref 32–36)
MCV RBC AUTO: 107 FL (ref 80–100)
METAMYELOCYTES # BLD MANUAL: 2.61 X10*3/UL
METAMYELOCYTES NFR BLD MANUAL: 4 %
MONOCYTES # BLD MANUAL: 2.61 X10*3/UL (ref 0.05–0.8)
MONOCYTES NFR BLD MANUAL: 4 %
MYELOCYTES # BLD MANUAL: 1.96 X10*3/UL
MYELOCYTES NFR BLD MANUAL: 3 %
NEUTROPHILS # BLD MANUAL: 56.73 X10*3/UL (ref 1.6–5.5)
NEUTS BAND # BLD MANUAL: 9.13 X10*3/UL (ref 0–0.5)
NEUTS BAND NFR BLD MANUAL: 14 %
NEUTS SEG # BLD MANUAL: 47.6 X10*3/UL (ref 1.6–5)
NEUTS SEG NFR BLD MANUAL: 73 %
NRBC BLD-RTO: 0.3 /100 WBCS (ref 0–0)
PLATELET # BLD AUTO: 129 X10*3/UL (ref 150–450)
POLYCHROMASIA BLD QL SMEAR: ABNORMAL
POTASSIUM SERPL-SCNC: 3.7 MMOL/L (ref 3.5–5.3)
PROT SERPL-MCNC: 5.9 G/DL (ref 6.4–8.2)
RBC # BLD AUTO: 2.92 X10*6/UL (ref 4.5–5.9)
RBC MORPH BLD: ABNORMAL
SARS-COV-2 RNA RESP QL NAA+PROBE: NOT DETECTED
SODIUM SERPL-SCNC: 141 MMOL/L (ref 136–145)
TOTAL CELLS COUNTED BLD: 100
WBC # BLD AUTO: 65.2 X10*3/UL (ref 4.4–11.3)

## 2024-02-01 PROCEDURE — 2500000004 HC RX 250 GENERAL PHARMACY W/ HCPCS (ALT 636 FOR OP/ED)

## 2024-02-01 PROCEDURE — 99215 OFFICE O/P EST HI 40 MIN: CPT

## 2024-02-01 PROCEDURE — 83735 ASSAY OF MAGNESIUM: CPT

## 2024-02-01 PROCEDURE — 85027 COMPLETE CBC AUTOMATED: CPT

## 2024-02-01 PROCEDURE — 2500000004 HC RX 250 GENERAL PHARMACY W/ HCPCS (ALT 636 FOR OP/ED): Performed by: STUDENT IN AN ORGANIZED HEALTH CARE EDUCATION/TRAINING PROGRAM

## 2024-02-01 PROCEDURE — 99417 PROLNG OP E/M EACH 15 MIN: CPT

## 2024-02-01 PROCEDURE — 80053 COMPREHEN METABOLIC PANEL: CPT

## 2024-02-01 PROCEDURE — 87635 SARS-COV-2 COVID-19 AMP PRB: CPT

## 2024-02-01 PROCEDURE — 1126F AMNT PAIN NOTED NONE PRSNT: CPT

## 2024-02-01 PROCEDURE — 1036F TOBACCO NON-USER: CPT

## 2024-02-01 PROCEDURE — 1159F MED LIST DOCD IN RCRD: CPT

## 2024-02-01 PROCEDURE — 85007 BL SMEAR W/DIFF WBC COUNT: CPT

## 2024-02-01 PROCEDURE — 82232 ASSAY OF BETA-2 PROTEIN: CPT

## 2024-02-01 PROCEDURE — 36591 DRAW BLOOD OFF VENOUS DEVICE: CPT

## 2024-02-01 PROCEDURE — 1160F RVW MEDS BY RX/DR IN RCRD: CPT

## 2024-02-01 RX ORDER — HEPARIN 100 UNIT/ML
500 SYRINGE INTRAVENOUS AS NEEDED
Status: CANCELLED | OUTPATIENT
Start: 2024-02-01

## 2024-02-01 RX ORDER — HEPARIN SODIUM 1000 [USP'U]/ML
2000 INJECTION, SOLUTION INTRAVENOUS; SUBCUTANEOUS AS NEEDED
Status: DISCONTINUED | OUTPATIENT
Start: 2024-02-01 | End: 2024-02-01 | Stop reason: HOSPADM

## 2024-02-01 RX ORDER — HEPARIN SODIUM 1000 [USP'U]/ML
2000 INJECTION, SOLUTION INTRAVENOUS; SUBCUTANEOUS AS NEEDED
Status: CANCELLED
Start: 2024-02-01

## 2024-02-01 RX ORDER — HEPARIN SODIUM,PORCINE/PF 10 UNIT/ML
50 SYRINGE (ML) INTRAVENOUS AS NEEDED
Status: DISCONTINUED | OUTPATIENT
Start: 2024-02-01 | End: 2024-02-01 | Stop reason: HOSPADM

## 2024-02-01 RX ORDER — HEPARIN SODIUM,PORCINE/PF 10 UNIT/ML
50 SYRINGE (ML) INTRAVENOUS AS NEEDED
Status: CANCELLED | OUTPATIENT
Start: 2024-02-01

## 2024-02-01 RX ADMIN — HEPARIN, PORCINE (PF) 10 UNIT/ML INTRAVENOUS SYRINGE 50 UNITS: at 12:52

## 2024-02-01 RX ADMIN — HEPARIN, PORCINE (PF) 10 UNIT/ML INTRAVENOUS SYRINGE 50 UNITS: at 13:32

## 2024-02-01 RX ADMIN — HEPARIN, PORCINE (PF) 10 UNIT/ML INTRAVENOUS SYRINGE 50 UNITS: at 12:50

## 2024-02-01 RX ADMIN — HEPARIN SODIUM 2000 UNITS: 1000 INJECTION INTRAVENOUS; SUBCUTANEOUS at 12:50

## 2024-02-01 RX ADMIN — HEPARIN SODIUM 2000 UNITS: 1000 INJECTION INTRAVENOUS; SUBCUTANEOUS at 12:52

## 2024-02-01 ASSESSMENT — PAIN SCALES - GENERAL: PAINLEVEL: 0-NO PAIN

## 2024-02-01 ASSESSMENT — ENCOUNTER SYMPTOMS
MYALGIAS: 1
GASTROINTESTINAL NEGATIVE: 1
EYES NEGATIVE: 1
ENDOCRINE NEGATIVE: 1
NUMBNESS: 1
HEMATOLOGIC/LYMPHATIC NEGATIVE: 1
RESPIRATORY NEGATIVE: 1
CARDIOVASCULAR NEGATIVE: 1
PSYCHIATRIC NEGATIVE: 1
CONSTITUTIONAL NEGATIVE: 1

## 2024-02-01 NOTE — PROGRESS NOTES
2/1/2024 1:00PM    SCT coordinator met with patient and JOSE A Vishal Harrell for his preadmission clinic visit. Patient was thoroughly assessed and cleared to move to transplant provided covid results came back negative. Patient reported no concerns. All patient questions were answered and patient shared relevant contact information and told to reach out with questions or concerns.     Gertrudis Olivarez RN

## 2024-02-02 LAB
BB ANTIBODY IDENTIFICATION: NORMAL
CASE #: NORMAL

## 2024-02-02 NOTE — PROGRESS NOTES
Patient ID: David Ferreira is a 78 y.o. male.  Referring Physician: No referring provider defined for this encounter.  Primary Care Provider: No Assigned PCP Generic Provider, MD  Date of Service:  2/1/2024    Chief Complaint: IgG Kappa Multiple Myeloma      Interval History:      Patient is with newly diagnosed Multiple myeloma IgG kappa. He is here for further discussion re: auto SCT as part of consolidation therapy for Multiple Myeloma.     Recent BW showed elevated protein-> further work up c/w Multiple Myeloma. Bone marrow Bx(1/27/23) 30-40% kappa restricted plasma cells FISH without high risk features M protein 3.8 g/dL  B2M 3.8 IgG 6072 free KLC 1944, [employer] 3.3  Kappa/Lambda ratio 589   alb 4.0  CT/PET 1/30/23 showed no abnormal lesions.  Patient denies recent weight loss, night sweats, poor appetites. He c/o right shoulder pain 2/2 rotator cuff tear.  He also c/o numbing/tingling both hands 2/2 CTS and bilateral LE pain for which he takes Gabapentin daily.    Treatment:    Velcade, Revlimid, Dexamethasone  Started in 3/2023.     Daratumumab, Kyprolis, Dexamethasone:  Therapy switched in 5/2023 due to slow response to therapy  Achieved [place]     Autologous SCT:  Prep w/ Sonja 140  T=0. 2/7/24    PMHx: Peripheral neuropathy bilateral LE's 2016 on gabapentin   Rotator cuff tear right.  ~6 month ago     PSHx: CTS repair bilateral: right ~ 2 years ago, left 3-4 months ago     Fhx: mother with colon cancer at 74  Father with CAD at 59         Oncology History   Multiple myeloma (CMS/HCC)   11/5/2023 Initial Diagnosis    Multiple myeloma (CMS/HCC)     2/6/2024 -  Bone Marrow Transplant          SUBJECTIVE:  History of Present Illness:  David presents to clinic 2/1/24 with his daughter and wife for a follow up visit.    Overall he is doing well.    His biggest complaint at this time is continued pain in his R shoulder from rotator cuff tear.        Review of Systems   Constitutional: Negative.    HENT: Negative.      Eyes: Negative.    Respiratory: Negative.     Cardiovascular: Negative.    Gastrointestinal: Negative.    Endocrine: Negative.    Genitourinary: Negative.    Musculoskeletal:  Positive for myalgias.   Skin: Negative.    Allergic/Immunologic: Positive for immunocompromised state.   Neurological:  Positive for numbness.   Hematological: Negative.    Psychiatric/Behavioral: Negative.       OBJECTIVE:  KPS: Karnofsky Score: 80 - Normal activity with effort; some signs or symptoms of disease   VS:  /79   Pulse 79   Temp 36.2 °C (97.2 °F)   Resp 16   Wt 77.3 kg (170 lb 6.7 oz)   SpO2 96%   BMI 26.97 kg/m²   BSA: 1.91 meters squared    Physical Exam  Constitutional:       Appearance: Normal appearance. He is normal weight.   HENT:      Head: Normocephalic and atraumatic.      Nose: Nose normal.      Mouth/Throat:      Mouth: Mucous membranes are moist.      Pharynx: Oropharynx is clear.   Eyes:      Extraocular Movements: Extraocular movements intact.      Conjunctiva/sclera: Conjunctivae normal.      Pupils: Pupils are equal, round, and reactive to light.   Cardiovascular:      Rate and Rhythm: Normal rate and regular rhythm.      Pulses: Normal pulses.      Heart sounds: Normal heart sounds.   Pulmonary:      Effort: Pulmonary effort is normal.      Breath sounds: Normal breath sounds.   Abdominal:      General: Abdomen is flat. Bowel sounds are normal.      Palpations: Abdomen is soft.   Musculoskeletal:         General: Normal range of motion.      Cervical back: Normal range of motion and neck supple.   Skin:     General: Skin is warm and dry.   Neurological:      General: No focal deficit present.      Mental Status: He is alert and oriented to person, place, and time. Mental status is at baseline.   Psychiatric:         Mood and Affect: Mood normal.         Behavior: Behavior normal.         Thought Content: Thought content normal.         Judgment: Judgment normal.       Laboratory:  The pertinent  laboratory results were reviewed and discussed with the patient.    Lab Results   Component Value Date    WBC 65.2 (HH) 02/01/2024    HCT 31.1 (L) 02/01/2024    HGB 9.9 (L) 02/01/2024     (L) 02/01/2024    ANC 56.73 (H) 02/01/2024    K 3.7 02/01/2024    CALCIUM 9.6 02/01/2024     02/01/2024    MG 1.94 02/01/2024    ALT 24 02/01/2024    AST 26 02/01/2024    BUN 18 02/01/2024    CREATININE 0.91 02/01/2024    KAPPA 27.43 (H) 01/25/2024    LAMBDA <0.17 (L) 01/25/2024    KAPLS  01/25/2024      Comment:      One or more analytes used in this calculation   is outside of the analytical measurement range.  Calculation cannot be performed.    SPEP Aberrant band detected. See immunofixation. 01/25/2024    IEPIN  01/25/2024 01/25/24 Known monoclonal IgG kappa in the gamma region at 0.9 g/dL. Last detected on 11/30/23 at 1.0 g/dL.    IGG 1,480 01/25/2024    IGM <5 (L) 01/25/2024    IGA <7 (L) 01/25/2024      Note: for a comprehensive list of the patient's lab results, access the Results Review activity.    ASSESSMENT and PLAN:    Plan:    Multiple Myeloma: IgG kappa. ISS stage II  - Bone marrow 30-40% kappa restricted plasma cells  - Fish negative   - CT/PET: negative  - b2M 3.8   - normal Ca++, Cr, LDH, alb  - VRd started in 3/2023 then switched to D-Kd in late May 23. tolerating chemo well.  - M protein 3.8>3.1>3.0>2.9>2.8>2.1>1.7>1.3  - IgG 6072>>4098>2480>>2211>1700>1480  - Kappa LC 1944 mg/L>>920>685>435  - K/L ratio 589>438>>>457  - Partial response after ~ 8 months Rx  - Bone marrow 11/9/23  5-10% kappa restricted plasma cells.  - discussed autoSCT   - cont current chemo  - not meet criteria for CART-> proceed with AutoSCT ~ jan 2024.  - Collected stem cells in 1 day 1/29/24  - Plan to admit on 2/5/24,  T=0, 2/7/24     Peripheral neuropathy  - bilateral LE's : on Gabapentin   - bilateral CTS-> s/p repair. still symptomatic    R Rotator Cuff Tear:  - Continued pain   - Needs surgery, will consider s/p  auto    RTC:  S/p auto, admit 2/5  Kishore Harrell, APRN-CNP

## 2024-02-03 ENCOUNTER — APPOINTMENT (OUTPATIENT)
Dept: HEMATOLOGY/ONCOLOGY | Facility: HOSPITAL | Age: 79
End: 2024-02-03
Payer: MEDICARE

## 2024-02-05 ENCOUNTER — HOSPITAL ENCOUNTER (INPATIENT)
Facility: HOSPITAL | Age: 79
LOS: 17 days | Discharge: HOME | DRG: 016 | End: 2024-02-22
Attending: INTERNAL MEDICINE | Admitting: PHYSICIAN ASSISTANT
Payer: MEDICARE

## 2024-02-05 DIAGNOSIS — K57.92 DIVERTICULITIS: ICD-10-CM

## 2024-02-05 DIAGNOSIS — C90.00 IGG MULTIPLE MYELOMA (MULTI): ICD-10-CM

## 2024-02-05 DIAGNOSIS — C90.00 MULTIPLE MYELOMA, REMISSION STATUS UNSPECIFIED (MULTI): ICD-10-CM

## 2024-02-05 DIAGNOSIS — R11.2 CHEMOTHERAPY INDUCED NAUSEA AND VOMITING: ICD-10-CM

## 2024-02-05 DIAGNOSIS — Z94.84 STEM CELLS TRANSPLANT STATUS (MULTI): ICD-10-CM

## 2024-02-05 DIAGNOSIS — C90.00 KAPPA LIGHT CHAIN MYELOMA (MULTI): Primary | ICD-10-CM

## 2024-02-05 DIAGNOSIS — T45.1X5A CHEMOTHERAPY INDUCED NAUSEA AND VOMITING: ICD-10-CM

## 2024-02-05 LAB
ALBUMIN SERPL BCP-MCNC: 4.2 G/DL (ref 3.4–5)
ALP SERPL-CCNC: 147 U/L (ref 33–136)
ALT SERPL W P-5'-P-CCNC: 18 U/L (ref 10–52)
ANION GAP SERPL CALC-SCNC: 15 MMOL/L (ref 10–20)
APTT PPP: 31 SECONDS (ref 27–38)
AST SERPL W P-5'-P-CCNC: 20 U/L (ref 9–39)
BASOPHILS # BLD MANUAL: 0 X10*3/UL (ref 0–0.1)
BASOPHILS NFR BLD MANUAL: 0 %
BILIRUB SERPL-MCNC: 0.9 MG/DL (ref 0–1.2)
BUN SERPL-MCNC: 22 MG/DL (ref 6–23)
BURR CELLS BLD QL SMEAR: ABNORMAL
CALCIUM SERPL-MCNC: 10.2 MG/DL (ref 8.6–10.6)
CHLORIDE SERPL-SCNC: 105 MMOL/L (ref 98–107)
CO2 SERPL-SCNC: 24 MMOL/L (ref 21–32)
CREAT SERPL-MCNC: 1.02 MG/DL (ref 0.5–1.3)
EGFRCR SERPLBLD CKD-EPI 2021: 76 ML/MIN/1.73M*2
EOSINOPHIL # BLD MANUAL: 0 X10*3/UL (ref 0–0.4)
EOSINOPHIL NFR BLD MANUAL: 0 %
ERYTHROCYTE [DISTWIDTH] IN BLOOD BY AUTOMATED COUNT: 14.7 % (ref 11.5–14.5)
GLUCOSE SERPL-MCNC: 81 MG/DL (ref 74–99)
HCT VFR BLD AUTO: 36.4 % (ref 41–52)
HGB BLD-MCNC: 11.9 G/DL (ref 13.5–17.5)
IMM GRANULOCYTES # BLD AUTO: 1.52 X10*3/UL (ref 0–0.5)
IMM GRANULOCYTES NFR BLD AUTO: 8.2 % (ref 0–0.9)
INR PPP: 1.1 (ref 0.9–1.1)
LDH SERPL L TO P-CCNC: 316 U/L (ref 84–246)
LYMPHOCYTES # BLD MANUAL: 0.83 X10*3/UL (ref 0.8–3)
LYMPHOCYTES NFR BLD MANUAL: 4.5 %
MAGNESIUM SERPL-MCNC: 2.36 MG/DL (ref 1.6–2.4)
MCH RBC QN AUTO: 34.8 PG (ref 26–34)
MCHC RBC AUTO-ENTMCNC: 32.7 G/DL (ref 32–36)
MCV RBC AUTO: 106 FL (ref 80–100)
METAMYELOCYTES # BLD MANUAL: 0.33 X10*3/UL
METAMYELOCYTES NFR BLD MANUAL: 1.8 %
MONOCYTES # BLD MANUAL: 1.31 X10*3/UL (ref 0.05–0.8)
MONOCYTES NFR BLD MANUAL: 7.1 %
MYELOCYTES # BLD MANUAL: 0.67 X10*3/UL
MYELOCYTES NFR BLD MANUAL: 3.6 %
NEUTROPHILS # BLD MANUAL: 15.35 X10*3/UL (ref 1.6–5.5)
NEUTS BAND # BLD MANUAL: 1.81 X10*3/UL (ref 0–0.5)
NEUTS BAND NFR BLD MANUAL: 9.8 %
NEUTS SEG # BLD MANUAL: 13.54 X10*3/UL (ref 1.6–5)
NEUTS SEG NFR BLD MANUAL: 73.2 %
NRBC BLD-RTO: 0.1 /100 WBCS (ref 0–0)
PLATELET # BLD AUTO: 170 X10*3/UL (ref 150–450)
POLYCHROMASIA BLD QL SMEAR: ABNORMAL
POTASSIUM SERPL-SCNC: 3.9 MMOL/L (ref 3.5–5.3)
PROT SERPL-MCNC: 7 G/DL (ref 6.4–8.2)
PROTHROMBIN TIME: 12.5 SECONDS (ref 9.8–12.8)
RBC # BLD AUTO: 3.42 X10*6/UL (ref 4.5–5.9)
RBC MORPH BLD: ABNORMAL
SARS-COV-2 RNA RESP QL NAA+PROBE: NOT DETECTED
SODIUM SERPL-SCNC: 140 MMOL/L (ref 136–145)
STOMATOCYTES BLD QL SMEAR: ABNORMAL
TOTAL CELLS COUNTED BLD: 112
URATE SERPL-MCNC: 8.8 MG/DL (ref 4–7.5)
WBC # BLD AUTO: 18.5 X10*3/UL (ref 4.4–11.3)

## 2024-02-05 PROCEDURE — 99223 1ST HOSP IP/OBS HIGH 75: CPT | Performed by: INTERNAL MEDICINE

## 2024-02-05 PROCEDURE — 83615 LACTATE (LD) (LDH) ENZYME: CPT | Performed by: PHYSICIAN ASSISTANT

## 2024-02-05 PROCEDURE — 85007 BL SMEAR W/DIFF WBC COUNT: CPT | Performed by: PHYSICIAN ASSISTANT

## 2024-02-05 PROCEDURE — 85027 COMPLETE CBC AUTOMATED: CPT | Performed by: PHYSICIAN ASSISTANT

## 2024-02-05 PROCEDURE — 87635 SARS-COV-2 COVID-19 AMP PRB: CPT | Performed by: PHYSICIAN ASSISTANT

## 2024-02-05 PROCEDURE — 2500000001 HC RX 250 WO HCPCS SELF ADMINISTERED DRUGS (ALT 637 FOR MEDICARE OP): Performed by: NURSE PRACTITIONER

## 2024-02-05 PROCEDURE — 84550 ASSAY OF BLOOD/URIC ACID: CPT | Performed by: PHYSICIAN ASSISTANT

## 2024-02-05 PROCEDURE — 1170000001 HC PRIVATE ONCOLOGY ROOM DAILY

## 2024-02-05 PROCEDURE — 80053 COMPREHEN METABOLIC PANEL: CPT | Performed by: PHYSICIAN ASSISTANT

## 2024-02-05 PROCEDURE — 85610 PROTHROMBIN TIME: CPT | Performed by: PHYSICIAN ASSISTANT

## 2024-02-05 PROCEDURE — 83735 ASSAY OF MAGNESIUM: CPT | Performed by: PHYSICIAN ASSISTANT

## 2024-02-05 PROCEDURE — 2500000001 HC RX 250 WO HCPCS SELF ADMINISTERED DRUGS (ALT 637 FOR MEDICARE OP): Performed by: PHYSICIAN ASSISTANT

## 2024-02-05 RX ORDER — GABAPENTIN 300 MG/1
300 CAPSULE ORAL
Status: DISCONTINUED | OUTPATIENT
Start: 2024-02-06 | End: 2024-02-22 | Stop reason: HOSPADM

## 2024-02-05 RX ORDER — CAPSAICIN 0.03 G/100G
1 CREAM TOPICAL NIGHTLY PRN
Status: DISCONTINUED | OUTPATIENT
Start: 2024-02-05 | End: 2024-02-22 | Stop reason: HOSPADM

## 2024-02-05 RX ORDER — GABAPENTIN 300 MG/1
600 CAPSULE ORAL NIGHTLY
Status: DISCONTINUED | OUTPATIENT
Start: 2024-02-05 | End: 2024-02-22 | Stop reason: HOSPADM

## 2024-02-05 RX ORDER — ACYCLOVIR 400 MG/1
400 TABLET ORAL EVERY 12 HOURS
Status: DISCONTINUED | OUTPATIENT
Start: 2024-02-05 | End: 2024-02-07

## 2024-02-05 RX ORDER — ALLOPURINOL 300 MG/1
300 TABLET ORAL 2 TIMES DAILY
Status: DISCONTINUED | OUTPATIENT
Start: 2024-02-05 | End: 2024-02-05

## 2024-02-05 RX ORDER — ACETAMINOPHEN 500 MG
10 TABLET ORAL NIGHTLY
Status: DISCONTINUED | OUTPATIENT
Start: 2024-02-05 | End: 2024-02-22 | Stop reason: HOSPADM

## 2024-02-05 RX ORDER — DULOXETIN HYDROCHLORIDE 30 MG/1
30 CAPSULE, DELAYED RELEASE ORAL NIGHTLY
Status: DISCONTINUED | OUTPATIENT
Start: 2024-02-05 | End: 2024-02-22 | Stop reason: HOSPADM

## 2024-02-05 RX ORDER — ALLOPURINOL 300 MG/1
300 TABLET ORAL 2 TIMES DAILY
Status: DISCONTINUED | OUTPATIENT
Start: 2024-02-05 | End: 2024-02-10

## 2024-02-05 RX ORDER — PANTOPRAZOLE SODIUM 40 MG/1
40 TABLET, DELAYED RELEASE ORAL
Status: DISCONTINUED | OUTPATIENT
Start: 2024-02-06 | End: 2024-02-22 | Stop reason: HOSPADM

## 2024-02-05 RX ADMIN — ALLOPURINOL 300 MG: 300 TABLET ORAL at 22:37

## 2024-02-05 RX ADMIN — ACYCLOVIR 400 MG: 400 TABLET ORAL at 20:35

## 2024-02-05 RX ADMIN — DULOXETINE HYDROCHLORIDE 30 MG: 30 CAPSULE, DELAYED RELEASE ORAL at 20:36

## 2024-02-05 RX ADMIN — Medication 10 MG: at 20:36

## 2024-02-05 RX ADMIN — GABAPENTIN 600 MG: 300 CAPSULE ORAL at 20:36

## 2024-02-05 SDOH — SOCIAL STABILITY: SOCIAL INSECURITY: HAS ANYONE EVER THREATENED TO HURT YOUR FAMILY OR YOUR PETS?: NO

## 2024-02-05 SDOH — SOCIAL STABILITY: SOCIAL INSECURITY: DO YOU FEEL ANYONE HAS EXPLOITED OR TAKEN ADVANTAGE OF YOU FINANCIALLY OR OF YOUR PERSONAL PROPERTY?: NO

## 2024-02-05 SDOH — SOCIAL STABILITY: SOCIAL INSECURITY: ARE YOU OR HAVE YOU BEEN THREATENED OR ABUSED PHYSICALLY, EMOTIONALLY, OR SEXUALLY BY ANYONE?: NO

## 2024-02-05 SDOH — SOCIAL STABILITY: SOCIAL INSECURITY: DOES ANYONE TRY TO KEEP YOU FROM HAVING/CONTACTING OTHER FRIENDS OR DOING THINGS OUTSIDE YOUR HOME?: NO

## 2024-02-05 SDOH — SOCIAL STABILITY: SOCIAL INSECURITY: DO YOU FEEL UNSAFE GOING BACK TO THE PLACE WHERE YOU ARE LIVING?: NO

## 2024-02-05 SDOH — SOCIAL STABILITY: SOCIAL INSECURITY: WERE YOU ABLE TO COMPLETE ALL THE BEHAVIORAL HEALTH SCREENINGS?: YES

## 2024-02-05 SDOH — SOCIAL STABILITY: SOCIAL INSECURITY: ARE THERE ANY APPARENT SIGNS OF INJURIES/BEHAVIORS THAT COULD BE RELATED TO ABUSE/NEGLECT?: NO

## 2024-02-05 SDOH — SOCIAL STABILITY: SOCIAL INSECURITY: HAVE YOU HAD THOUGHTS OF HARMING ANYONE ELSE?: NO

## 2024-02-05 SDOH — SOCIAL STABILITY: SOCIAL INSECURITY: ABUSE: ADULT

## 2024-02-05 ASSESSMENT — LIFESTYLE VARIABLES
HOW MANY STANDARD DRINKS CONTAINING ALCOHOL DO YOU HAVE ON A TYPICAL DAY: PATIENT DOES NOT DRINK
AUDIT-C TOTAL SCORE: 0
HOW OFTEN DO YOU HAVE 6 OR MORE DRINKS ON ONE OCCASION: NEVER
SKIP TO QUESTIONS 9-10: 1
AUDIT-C TOTAL SCORE: 0
HOW OFTEN DO YOU HAVE A DRINK CONTAINING ALCOHOL: NEVER

## 2024-02-05 ASSESSMENT — COGNITIVE AND FUNCTIONAL STATUS - GENERAL
MOBILITY SCORE: 23
PATIENT BASELINE BEDBOUND: NO
DAILY ACTIVITIY SCORE: 24
CLIMB 3 TO 5 STEPS WITH RAILING: A LITTLE

## 2024-02-05 ASSESSMENT — ENCOUNTER SYMPTOMS
MYALGIAS: 1
COUGH: 0
BRUISES/BLEEDS EASILY: 0
DIZZINESS: 0
CHILLS: 0
BLOOD IN STOOL: 0
FEVER: 0
SINUS PRESSURE: 0
DIFFICULTY URINATING: 0
VOMITING: 0
ABDOMINAL PAIN: 0
DIARRHEA: 0
SHORTNESS OF BREATH: 0
HEADACHES: 0
CONSTIPATION: 0
NAUSEA: 0
HEMATURIA: 0

## 2024-02-05 ASSESSMENT — ACTIVITIES OF DAILY LIVING (ADL)
BATHING: INDEPENDENT
LACK_OF_TRANSPORTATION: NO
ASSISTIVE_DEVICE: EYEGLASSES
TOILETING: INDEPENDENT
WALKS IN HOME: INDEPENDENT
HEARING - RIGHT EAR: FUNCTIONAL
GROOMING: INDEPENDENT
ADEQUATE_TO_COMPLETE_ADL: YES
FEEDING YOURSELF: INDEPENDENT
HEARING - LEFT EAR: FUNCTIONAL
JUDGMENT_ADEQUATE_SAFELY_COMPLETE_DAILY_ACTIVITIES: YES
DRESSING YOURSELF: INDEPENDENT
PATIENT'S MEMORY ADEQUATE TO SAFELY COMPLETE DAILY ACTIVITIES?: YES

## 2024-02-05 ASSESSMENT — PATIENT HEALTH QUESTIONNAIRE - PHQ9
SUM OF ALL RESPONSES TO PHQ9 QUESTIONS 1 & 2: 0
1. LITTLE INTEREST OR PLEASURE IN DOING THINGS: NOT AT ALL
2. FEELING DOWN, DEPRESSED OR HOPELESS: NOT AT ALL

## 2024-02-05 ASSESSMENT — COLUMBIA-SUICIDE SEVERITY RATING SCALE - C-SSRS
2. HAVE YOU ACTUALLY HAD ANY THOUGHTS OF KILLING YOURSELF?: NO
1. IN THE PAST MONTH, HAVE YOU WISHED YOU WERE DEAD OR WISHED YOU COULD GO TO SLEEP AND NOT WAKE UP?: NO
6. HAVE YOU EVER DONE ANYTHING, STARTED TO DO ANYTHING, OR PREPARED TO DO ANYTHING TO END YOUR LIFE?: NO

## 2024-02-05 ASSESSMENT — PAIN - FUNCTIONAL ASSESSMENT: PAIN_FUNCTIONAL_ASSESSMENT: 0-10

## 2024-02-05 ASSESSMENT — PAIN SCALES - GENERAL: PAINLEVEL_OUTOF10: 0 - NO PAIN

## 2024-02-05 NOTE — H&P
History Of Present Illness  David Ferreira is a 78 y.o. male with IgG [place] Myeloma in [place] admitted for his Autologous SCT with Sonja 140 (T=0, 2/07/24)     Past Medical History  IgG Kappa Multiple Myeloma, Rt Rotator cuff tear, peripheral neuropathy, CTS    Surgical History  CTS repair bilateral: right ~ 2 years ago, left 3-4 months ago    Onc History  Originally evaluated for elevated protein. Work up c/w IgG Kappa Multiple Myeloma, ISS stage II.  BmBx(1/27/23) c/w 30-40% kappa restricted plasma cells FISH without high risk features. M protein 3.8 g/dL  B2M 3.8 IgG 6072 free KLC 1944, LLC 3.3  Kappa/Lambda ratio 589   alb 4.0.  CT/PET 1/30/23 showed no abnormal lesions.    Treatment:  Velcade, Revlimid, Dexamethasone  Started in 3/2023.      Daratumumab, Kyprolis, Dexamethasone:  Therapy switched in 5/2023 due to slow response to therapy  Achieved [place]      Plan Autologous SCT:  Prep w/ Sonja 140  T=0. 2/7/24       Fhx: Mother with colon cancer at 74  Father with CAD at 59     Social History  Previous smoker. . Has 2 adult children. Lives in [place], [place]. Retired from .     Allergies  Patient has no known allergies.    Review of Systems   Constitutional:  Negative for chills and fever.   HENT:  Negative for congestion, mouth sores and sinus pressure.    Eyes:  Negative for visual disturbance.   Respiratory:  Negative for cough and shortness of breath.    Cardiovascular:  Negative for chest pain.   Gastrointestinal:  Negative for abdominal pain, blood in stool, constipation, diarrhea, nausea and vomiting.   Genitourinary:  Negative for difficulty urinating and hematuria.   Musculoskeletal:  Positive for myalgias.        Rt shoulder pain due to rotator cuff tear  Decr Rt arm/shoulder strength   Skin:  Negative for rash.   Neurological:  Negative for dizziness and headaches.        Numbness/tingling (neuropathy) of his hands and feet   Hematological:  Does not bruise/bleed easily.   Psychiatric/Behavioral:   "        Poor sleep due to Rt shoulder pain and neuropathy        Physical Exam  Vitals reviewed.   Constitutional:       General: He is not in acute distress.     Appearance: Normal appearance.   HENT:      Head: Normocephalic and atraumatic.      Nose: Nose normal.      Mouth/Throat:      Mouth: Mucous membranes are moist.   Eyes:      General: No scleral icterus.     Extraocular Movements: Extraocular movements intact.      Pupils: Pupils are equal, round, and reactive to light.   Cardiovascular:      Rate and Rhythm: Normal rate and regular rhythm.   Pulmonary:      Effort: Pulmonary effort is normal. No respiratory distress.      Breath sounds: Normal breath sounds. No wheezing, rhonchi or rales.   Abdominal:      General: Abdomen is flat. Bowel sounds are normal. There is no distension.      Palpations: Abdomen is soft.      Tenderness: There is no abdominal tenderness.   Musculoskeletal:      Cervical back: Normal range of motion and neck supple.      Right lower leg: No edema.      Left lower leg: No edema.      Comments: Decrease ROM Rt shoulder, decrease Rt arm strength   Skin:     General: Skin is warm and dry.   Neurological:      General: No focal deficit present.      Mental Status: He is alert and oriented to person, place, and time.      Gait: Gait normal.   Psychiatric:         Mood and Affect: Mood normal.         Behavior: Behavior normal.          Last Recorded Vitals  Blood pressure 156/86, pulse 84, temperature 36.5 °C (97.7 °F), temperature source Temporal, resp. rate 18, height 1.651 m (5' 5\"), weight 73.9 kg (162 lb 13 oz), SpO2 98 %.    Relevant Results    Type Scheduled medications  acyclovir, 400 mg, oral, q12h  DULoxetine, 30 mg, oral, Nightly  [START ON 2/6/2024] gabapentin, 300 mg, oral, BID  gabapentin, 600 mg, oral, Nightly  melatonin, 10 mg, oral, Nightly      Continuous medications     PRN medications  PRN medications: alteplase, capsaicin         Assessment/Plan   Principal " Problem:    IgG multiple myeloma (CMS/HCC)  Active Problems:    Kappa light chain myeloma (CMS/HCC)      David Ferreira is a 78 y.o. male with IgG [place] Myeloma in [place] admitted for his Autologous SCT with Sonja 140 (T=0, 2/07/24)      ONC:  -Hx/o IgG Kappa Multiple Myeloma  -Initially evaluated for elevated protein  -Bmbx c/w 30-40% kappa restricted plasma cells. FISH neg.   -CT/PET neg  -B2M 3.8, IgG 6072, Kappa  mg/L, K/L ratio 589, normal Ca, Cr, LDH, alb  -3/2023 Started Velcade, Revlimid, and Dex  -Switched to Pratibha, Kyprolis, and Dex 5/2023 with slow response to therapy  - Now in [place]  - Collected stem cells in 1 day on 1/29/24  - Now admitted for his Autologous SCT (2/07/24)    HEME:  - Asymptomatic anemia and thrombocytopenia  - Monitor CBC and transfuse as needed      ID:  - Afebrile  - Plan ACV and Fluconazole prophy per protocol  - Plan Zosyn with fever    FEN/GI:  - Admit wt: 73.9 kg  - Added PPI  - Monitor electrolytes and replete as needed    CV:  ECHO (11/7/23) with EF of 65%    MSK:  - Rt Rotator cuff tear  - Cont Capsaicin as needed    NEURO:  - 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 Cont Gabapentin. Added Cymbalta 30mg at bedtime per Dr. Contreras's recs.    MSC:  - Cont bedtime Melatonin for sleep    Patient seen and examined. Discussed with Dr. Contreras.      I spent 60 minutes in the professional and overall care of this patient.      Leigh Williamson PA-C

## 2024-02-06 ENCOUNTER — APPOINTMENT (OUTPATIENT)
Dept: OTHER | Facility: HOSPITAL | Age: 79
End: 2024-02-06
Payer: MEDICARE

## 2024-02-06 LAB
ALBUMIN SERPL BCP-MCNC: 3.6 G/DL (ref 3.4–5)
ALP SERPL-CCNC: 110 U/L (ref 33–136)
ALT SERPL W P-5'-P-CCNC: 16 U/L (ref 10–52)
ANION GAP SERPL CALC-SCNC: 10 MMOL/L (ref 10–20)
AST SERPL W P-5'-P-CCNC: 17 U/L (ref 9–39)
BASOPHILS # BLD MANUAL: 0.1 X10*3/UL (ref 0–0.1)
BASOPHILS NFR BLD MANUAL: 0.9 %
BILIRUB DIRECT SERPL-MCNC: 0.1 MG/DL (ref 0–0.3)
BILIRUB SERPL-MCNC: 0.5 MG/DL (ref 0–1.2)
BUN SERPL-MCNC: 23 MG/DL (ref 6–23)
CALCIUM SERPL-MCNC: 9.5 MG/DL (ref 8.6–10.6)
CHLORIDE SERPL-SCNC: 109 MMOL/L (ref 98–107)
CO2 SERPL-SCNC: 27 MMOL/L (ref 21–32)
CREAT SERPL-MCNC: 1.01 MG/DL (ref 0.5–1.3)
EGFRCR SERPLBLD CKD-EPI 2021: 77 ML/MIN/1.73M*2
EOSINOPHIL # BLD MANUAL: 0.31 X10*3/UL (ref 0–0.4)
EOSINOPHIL NFR BLD MANUAL: 2.7 %
ERYTHROCYTE [DISTWIDTH] IN BLOOD BY AUTOMATED COUNT: 14.8 % (ref 11.5–14.5)
GLUCOSE SERPL-MCNC: 110 MG/DL (ref 74–99)
HCT VFR BLD AUTO: 31.8 % (ref 41–52)
HGB BLD-MCNC: 10.3 G/DL (ref 13.5–17.5)
IMM GRANULOCYTES # BLD AUTO: 0.91 X10*3/UL (ref 0–0.5)
IMM GRANULOCYTES NFR BLD AUTO: 7.8 % (ref 0–0.9)
LYMPHOCYTES # BLD MANUAL: 0.61 X10*3/UL (ref 0.8–3)
LYMPHOCYTES NFR BLD MANUAL: 5.3 %
MAGNESIUM SERPL-MCNC: 2.35 MG/DL (ref 1.6–2.4)
MCH RBC QN AUTO: 34.7 PG (ref 26–34)
MCHC RBC AUTO-ENTMCNC: 32.4 G/DL (ref 32–36)
MCV RBC AUTO: 107 FL (ref 80–100)
METAMYELOCYTES # BLD MANUAL: 0.41 X10*3/UL
METAMYELOCYTES NFR BLD MANUAL: 3.5 %
MONOCYTES # BLD MANUAL: 0.61 X10*3/UL (ref 0.05–0.8)
MONOCYTES NFR BLD MANUAL: 5.3 %
MYELOCYTES # BLD MANUAL: 0.31 X10*3/UL
MYELOCYTES NFR BLD MANUAL: 2.7 %
NEUTS SEG # BLD MANUAL: 9.23 X10*3/UL (ref 1.6–5)
NEUTS SEG NFR BLD MANUAL: 79.6 %
NRBC BLD-RTO: 0 /100 WBCS (ref 0–0)
PHOSPHATE SERPL-MCNC: 3.5 MG/DL (ref 2.5–4.9)
PLATELET # BLD AUTO: 144 X10*3/UL (ref 150–450)
POTASSIUM SERPL-SCNC: 3.9 MMOL/L (ref 3.5–5.3)
PROT SERPL-MCNC: 5.9 G/DL (ref 6.4–8.2)
RBC # BLD AUTO: 2.97 X10*6/UL (ref 4.5–5.9)
RBC MORPH BLD: ABNORMAL
SODIUM SERPL-SCNC: 142 MMOL/L (ref 136–145)
TOTAL CELLS COUNTED BLD: 113
URATE SERPL-MCNC: 8.1 MG/DL (ref 4–7.5)
WBC # BLD AUTO: 11.6 X10*3/UL (ref 4.4–11.3)

## 2024-02-06 PROCEDURE — 84550 ASSAY OF BLOOD/URIC ACID: CPT | Performed by: PHYSICIAN ASSISTANT

## 2024-02-06 PROCEDURE — 85027 COMPLETE CBC AUTOMATED: CPT | Performed by: PHYSICIAN ASSISTANT

## 2024-02-06 PROCEDURE — 2500000001 HC RX 250 WO HCPCS SELF ADMINISTERED DRUGS (ALT 637 FOR MEDICARE OP): Performed by: PHYSICIAN ASSISTANT

## 2024-02-06 PROCEDURE — 2500000001 HC RX 250 WO HCPCS SELF ADMINISTERED DRUGS (ALT 637 FOR MEDICARE OP): Performed by: NURSE PRACTITIONER

## 2024-02-06 PROCEDURE — 99233 SBSQ HOSP IP/OBS HIGH 50: CPT | Performed by: INTERNAL MEDICINE

## 2024-02-06 PROCEDURE — 85007 BL SMEAR W/DIFF WBC COUNT: CPT | Performed by: PHYSICIAN ASSISTANT

## 2024-02-06 PROCEDURE — 2500000004 HC RX 250 GENERAL PHARMACY W/ HCPCS (ALT 636 FOR OP/ED): Mod: MUE | Performed by: PHYSICIAN ASSISTANT

## 2024-02-06 PROCEDURE — 83735 ASSAY OF MAGNESIUM: CPT | Performed by: PHYSICIAN ASSISTANT

## 2024-02-06 PROCEDURE — 1170000001 HC PRIVATE ONCOLOGY ROOM DAILY

## 2024-02-06 PROCEDURE — 84100 ASSAY OF PHOSPHORUS: CPT | Performed by: PHYSICIAN ASSISTANT

## 2024-02-06 PROCEDURE — 2500000004 HC RX 250 GENERAL PHARMACY W/ HCPCS (ALT 636 FOR OP/ED): Performed by: INTERNAL MEDICINE

## 2024-02-06 PROCEDURE — 97161 PT EVAL LOW COMPLEX 20 MIN: CPT | Mod: GP

## 2024-02-06 PROCEDURE — 82248 BILIRUBIN DIRECT: CPT | Performed by: PHYSICIAN ASSISTANT

## 2024-02-06 PROCEDURE — 80053 COMPREHEN METABOLIC PANEL: CPT | Performed by: PHYSICIAN ASSISTANT

## 2024-02-06 RX ORDER — MELPHALAN HCL 50 MG
140 VIAL (EA) INTRAVENOUS ONCE
Status: COMPLETED | OUTPATIENT
Start: 2024-02-06 | End: 2024-02-06

## 2024-02-06 RX ORDER — PROCHLORPERAZINE MALEATE 10 MG
10 TABLET ORAL EVERY 6 HOURS PRN
Status: DISCONTINUED | OUTPATIENT
Start: 2024-02-06 | End: 2024-02-22 | Stop reason: HOSPADM

## 2024-02-06 RX ORDER — PALONOSETRON 0.05 MG/ML
0.25 INJECTION, SOLUTION INTRAVENOUS ONCE
Status: COMPLETED | OUTPATIENT
Start: 2024-02-06 | End: 2024-02-06

## 2024-02-06 RX ORDER — EPINEPHRINE 1 MG/ML
0.3 INJECTION, SOLUTION, CONCENTRATE INTRAVENOUS EVERY 5 MIN PRN
Status: DISCONTINUED | OUTPATIENT
Start: 2024-02-06 | End: 2024-02-08 | Stop reason: ALTCHOICE

## 2024-02-06 RX ORDER — ALBUTEROL SULFATE 0.83 MG/ML
3 SOLUTION RESPIRATORY (INHALATION) AS NEEDED
Status: DISCONTINUED | OUTPATIENT
Start: 2024-02-06 | End: 2024-02-08 | Stop reason: ALTCHOICE

## 2024-02-06 RX ORDER — SODIUM CHLORIDE 9 MG/ML
50 INJECTION, SOLUTION INTRAVENOUS CONTINUOUS
Status: DISCONTINUED | OUTPATIENT
Start: 2024-02-06 | End: 2024-02-08

## 2024-02-06 RX ORDER — DIPHENHYDRAMINE HYDROCHLORIDE 50 MG/ML
50 INJECTION INTRAMUSCULAR; INTRAVENOUS AS NEEDED
Status: DISCONTINUED | OUTPATIENT
Start: 2024-02-06 | End: 2024-02-08 | Stop reason: ALTCHOICE

## 2024-02-06 RX ORDER — FAMOTIDINE 10 MG/ML
20 INJECTION INTRAVENOUS AS NEEDED
Status: DISCONTINUED | OUTPATIENT
Start: 2024-02-06 | End: 2024-02-08 | Stop reason: ALTCHOICE

## 2024-02-06 RX ORDER — PROCHLORPERAZINE EDISYLATE 5 MG/ML
10 INJECTION INTRAMUSCULAR; INTRAVENOUS EVERY 6 HOURS PRN
Status: DISCONTINUED | OUTPATIENT
Start: 2024-02-06 | End: 2024-02-21

## 2024-02-06 RX ADMIN — DEXAMETHASONE SODIUM PHOSPHATE 12 MG: 10 INJECTION, SOLUTION INTRAMUSCULAR; INTRAVENOUS at 08:43

## 2024-02-06 RX ADMIN — GABAPENTIN 300 MG: 300 CAPSULE ORAL at 08:42

## 2024-02-06 RX ADMIN — ALLOPURINOL 300 MG: 300 TABLET ORAL at 08:42

## 2024-02-06 RX ADMIN — DULOXETINE HYDROCHLORIDE 30 MG: 30 CAPSULE, DELAYED RELEASE ORAL at 20:09

## 2024-02-06 RX ADMIN — Medication 267.4 MG: at 09:50

## 2024-02-06 RX ADMIN — PALONOSETRON HYDROCHLORIDE 250 MCG: 0.25 INJECTION INTRAVENOUS at 08:42

## 2024-02-06 RX ADMIN — FOSAPREPITANT DIMEGLUMINE 150 MG: 150 INJECTION, POWDER, LYOPHILIZED, FOR SOLUTION INTRAVENOUS at 09:19

## 2024-02-06 RX ADMIN — SODIUM CHLORIDE 100 ML/HR: 9 INJECTION, SOLUTION INTRAVENOUS at 08:43

## 2024-02-06 RX ADMIN — Medication 10 MG: at 20:09

## 2024-02-06 RX ADMIN — ACYCLOVIR 400 MG: 400 TABLET ORAL at 20:09

## 2024-02-06 RX ADMIN — ALLOPURINOL 300 MG: 300 TABLET ORAL at 20:09

## 2024-02-06 RX ADMIN — PANTOPRAZOLE SODIUM 40 MG: 40 TABLET, DELAYED RELEASE ORAL at 06:30

## 2024-02-06 RX ADMIN — GABAPENTIN 600 MG: 300 CAPSULE ORAL at 20:09

## 2024-02-06 RX ADMIN — GABAPENTIN 300 MG: 300 CAPSULE ORAL at 13:12

## 2024-02-06 RX ADMIN — ACYCLOVIR 400 MG: 400 TABLET ORAL at 08:42

## 2024-02-06 ASSESSMENT — PAIN SCALES - GENERAL
PAINLEVEL_OUTOF10: 0 - NO PAIN

## 2024-02-06 ASSESSMENT — COGNITIVE AND FUNCTIONAL STATUS - GENERAL
MOBILITY SCORE: 24
DAILY ACTIVITIY SCORE: 24
MOBILITY SCORE: 22
CLIMB 3 TO 5 STEPS WITH RAILING: A LITTLE
DAILY ACTIVITIY SCORE: 24
MOBILITY SCORE: 24
WALKING IN HOSPITAL ROOM: A LITTLE

## 2024-02-06 ASSESSMENT — ACTIVITIES OF DAILY LIVING (ADL)
ADL_ASSISTANCE: INDEPENDENT
LACK_OF_TRANSPORTATION: NO

## 2024-02-06 ASSESSMENT — PAIN - FUNCTIONAL ASSESSMENT
PAIN_FUNCTIONAL_ASSESSMENT: 0-10
PAIN_FUNCTIONAL_ASSESSMENT: 0-10

## 2024-02-06 NOTE — PROGRESS NOTES
Physical Therapy    Physical Therapy Evaluation    Patient Name: David Ferreira  MRN: [id number]  Today's Date: 2/6/2024   Time Calculation  Start Time: 1410  Stop Time: 1436  Time Calculation (min): 26 min    Assessment/Plan   PT Assessment  PT Assessment Results: Decreased strength, Decreased range of motion, Decreased endurance, Impaired balance, Decreased mobility  Rehab Prognosis: Good  Evaluation/Treatment Tolerance: Patient tolerated treatment well  Medical Staff Made Aware: Yes  End of Session Communication: Bedside nurse  Assessment Comment: Pt is a 78 year old male who presents for SCT. Pt reports increased pain and demonstrates deficits in strength, endurance, balance, and ROM leading to deficits in functional mobility. Pt would benefit from continued therapy during and following hospital stay  End of Session Patient Position: Bed, 3 rail up, Alarm off, not on at start of session (sitting on EOB)  IP OR SWING BED PT PLAN  Inpatient or Swing Bed: Inpatient  PT Plan  Treatment/Interventions: Bed mobility, Gait training, Transfer training, Stair training, Balance training, Neuromuscular re-education, Strengthening, Endurance training, Range of motion, Therapeutic exercise, Therapeutic activity, Home exercise program, Positioning, Postural re-education  PT Plan: Skilled PT  PT Frequency: 2 times per week  PT Discharge Recommendations: Low intensity level of continued care      Subjective   General Visit Information:  General  Reason for Referral: IgG Stella Myeloma in [place] admitted for his Autologous SCT with Sonja 140 (T=0, 2/07/24)  Past Medical History Relevant to Rehab: IgG Kappa Multiple Myeloma, Rt Rotator cuff tear, peripheral neuropathy, CTS  Family/Caregiver Present: No  Prior to Session Communication: Bedside nurse  Patient Position Received: Bed, 3 rail up, Alarm off, not on at start of session (sitting on EOB)  General Comment: Pt is pleasant, cooperative, and willing to participate in therapy  Home  Living:  Home Living  Type of Home: Apartment  Lives With: Spouse (pt reports daughter is currently stay to assist)  Home Adaptive Equipment: Cane, Crutches  Home Layout: One level  Home Access: Elevator  Bathroom Shower/Tub: Tub/shower unit  Bathroom Toilet: Standard  Bathroom Equipment: None  Prior Level of Function:  Prior Function Per Pt/Caregiver Report  Level of [place]:  (Independent with ADLs and mobility)  Receives Help From: Family  ADL Assistance: Independent  Homemaking Assistance: Needs assistance  Ambulatory Assistance: Independent  Vocational: Retired  Leisure: enjoys playing [employer]  Prior Function Comments: no hx of falls, (+) driving  Precautions:  Precautions  Medical Precautions: Fall precautions (Protective precautions)  Precautions Comment: H/H: 10.3/31.8; Plts:144; WBC: 11.6  Vital Signs:  Vital Signs  Heart Rate: 87  SpO2: 97 %  BP: 123/77  BP Location: Right arm  BP Method: Automatic  Patient Position: Sitting    Objective   Pain:  Pain Assessment  Pain Assessment: 0-10  Pain Score: 0 - No pain  Cognition:  Cognition  Overall Cognitive Status: Within Functional Limits  Orientation Level: Oriented X4    General Assessments:  Activity Tolerance  Endurance: Endurance does not limit participation in activity    Sensation  Sensation Comment: neuropathy in fingers and feet    Functional Assessments:  Bed Mobility  Bed Mobility: No    Transfers  Transfer: Yes  Transfer 1  Transfer From 1: Sit to, Stand to  Transfer to 1: Stand, Sit  Technique 1: Sit to stand, Stand to sit  Transfer Level of Assistance 1:  (SBA)  Trials/Comments 1: VCs, multiple trials during session, good control of COM over ASHLEY, good control with descent from stand    Ambulation/Gait Training  Ambulation/Gait Training Performed: Yes  Ambulation/Gait Training 1  Surface 1: Level tile  Device 1: IV Pole  Assistance 1: Minimal verbal cues (SBA)  Quality of Gait 1: Soft knee(s) (decreased foot clearance)  Comments/Distance (ft) 1:  20 ft (further ambulation deferred due pt reporting increased L LE pain with ambulating halls recently)    Stairs  Stairs: No  Extremity/Trunk Assessments:  RLE   RLE :  (>/= 3+/5 observed via function)  LLE   LLE :  (>/= 3+/5 observed via function)  Outcome Measures:  [facility] Basic Mobility  Turning from your back to your side while in a flat bed without using bedrails: None  Moving from lying on your back to sitting on the side of a flat bed without using bedrails: None  Moving to and from bed to chair (including a wheelchair): None  Standing up from a chair using your arms (e.g. wheelchair or bedside chair): None  To walk in hospital room: A little  Climbing 3-5 steps with railing: A little  Basic Mobility - Total Score: 22    Tinetti  Sitting Balance: Steady, safe  Arises: Able without using arms  Attempts to Arise: Able to arise, one attempt  Immediate Standing Balance (First 5 Seconds): Steady without walker or other support  Standing Balance: Narrow stance without support  Nudged: Staggers, grabs, catches self  Eyes Closed: Unsteady  Turned 360 Degrees: Steadiness: Steady  Turned 360 Degrees: Continuity of Steps: Continuous  Sitting Down: Safe, smooth motion  Balance Score: 14  Initiation of Gait: No hesitancy  Step Height: R Swing Foot: Right foot complete clears floor  Step Length: R Swing Foot: Passes left stance foot  Step Height: L Swing Foot: Left foot complete clears floor  Step Length: L Swing Foot: Passes right stance foot  Step Symmetry: Right and left step appear equal  Step Continuity: Steps appear continuous  Path: Straight without walking aid  Trunk: No sway but flexion of knees or back or spreads arms out while walking  Walking Time: Heels almost touching while walking  Gait Score: 11  Total Score: 25    Other Measures  30 Second Sit to Stand: 20 STS transfers, good control throughout    Encounter Problems       Encounter Problems (Active)       Balance       Pt will score a 25/28 or greater on  the Tinetti balance assessment in order to demonstrate low fall risk.        Start:  02/06/24    Expected End:  02/27/24               Mobility       STG - Patient will ambulate >800 ft independently with no LOB, progress as able and appropriate       Start:  02/06/24    Expected End:  02/27/24            Pt will ambulate 800 ft independently with no signs of fatigue or SOB        Start:  02/06/24    Expected End:  02/27/24               Transfers       STG - Patient will perform bed mobility independently        Start:  02/06/24    Expected End:  02/27/24            STG - Patient will transfer sit to and from stand independently        Start:  02/06/24    Expected End:  02/27/24                   Education Documentation  Precautions, taught by Maye Murphy PT at 2/6/2024  3:10 PM.  Learner: Patient  Readiness: Acceptance  Method: Explanation  Response: Verbalizes Understanding  Comment: transfers, gait, importance of mobility and activity during hospital stay, activity and symptom monitoring    Mobility Training, taught by Maey Murphy, PT at 2/6/2024  3:10 PM.  Learner: Patient  Readiness: Acceptance  Method: Explanation  Response: Verbalizes Understanding  Comment: transfers, gait, importance of mobility and activity during hospital stay, activity and symptom monitoring    Education Comments  No comments found.        02/06/24 at 3:12 PM - Maye Murphy PT

## 2024-02-06 NOTE — PROGRESS NOTES
Pharmacy Medication History Review    David Ferreira is a 78 y.o. male admitted for IgG multiple myeloma (CMS/[facility]). Pharmacy reviewed the patient's scjle-je-[id number] medications and allergies for accuracy.    The list below reflects the updated PTA list. Comments regarding how patient may be taking medications differently can be found in the Admit Orders Activity  Prior to Admission Medications   Prescriptions Last Dose Informant Patient Reported? Taking?   acyclovir (Zovirax) 400 mg tablet Unknown Self, Other No No   Sig: Take 1 tablet (400 mg) by mouth every 12 hours.   capsaicin (Zostrix) 0.025 % cream Unknown Self, Other No No   Sig: Apply topically as needed at bedtime for mild pain (1 - 3).   gabapentin (Neurontin) 300 mg capsule 2/6/2024 Self, Other No No   Sig: Take 1 capsule (300 mg) by mouth once daily with breakfast AND 1 capsule (300 mg) once daily at noon. Take with meals AND 2 capsules (600 mg) once daily at bedtime.   melatonin 10 mg tablet 2/5/2024 Self, Other No No   Sig: Take 1 tablet (10 mg) by mouth once daily at bedtime.      Facility-Administered Medications: None        The list below reflects the updated allergy list. Please review each documented allergy for additional clarification and justification.  Allergies  Reviewed by Tonie Dejesus RN on 2/5/2024   No Known Allergies         Patient accepts M2B at discharge. Pharmacy has been updated to [id number].    Sources used to complete the med history include   - out patient fill history, OARRS, and patient interview  - Patient (good historian)    Below are additional concerns with the patient's PTA list.  - None    Araceli Norman, PharmD  PGY-1 Pharmacy Resident   Meds Ambulatory and Retail Services  Please reach out via [employer] Chat for questions, or if no response call "[employer] or [employer] “[employer]”

## 2024-02-06 NOTE — NURSING NOTE
Pt received a total of 53 ml of Melphalan divided into 2 syringes. BRB obtained before, during and after treatment. Medication verified with Shannon Neumann RN. Pt tolerated well.

## 2024-02-06 NOTE — PROGRESS NOTES
02/06/24 1122   Discharge Planning   Living Arrangements Spouse/significant other   Support Systems Spouse/significant other;Children;Family members   Assistance Needed Pt needs some assistance with fine motor hand functions like opening packages.   Type of Residence Private residence   Number of Stairs to Enter Residence 0   Number of Stairs Within Residence 0   Home or Post Acute Services None   Patient expects to be discharged to: home   Does the patient need discharge transport arranged? No   Financial Resource Strain   How hard is it for you to pay for the very basics like food, housing, medical care, and heating? Not hard   Housing Stability   In the last 12 months, was there a time when you were not able to pay the mortgage or rent on time? N   In the last 12 months, how many places have you lived? 1   In the last 12 months, was there a time when you did not have a steady place to sleep or slept in a shelter (including now)? N   Transportation Needs   In the past 12 months, has lack of transportation kept you from medical appointments or from getting medications? no   In the past 12 months, has lack of transportation kept you from meetings, work, or from getting things needed for daily living? No     Pt with Multiple Myeloma was admitted for an Auto PBSCT, T=0 2/7/24. He will be returning home after discharge with the assistance of his wife and daughter, who will be his caregivers.  He is still independent,  just has some limitations of his right hand due to a prior surgery.  He states he has DME at home but isn't currently needing to use them.  He has a right internal jugular catheter which will be pulled prior to discharge.  His MD is Dr. Contreras/Vishal Harrell, [facility].  Will continue to follow for any home going needs.

## 2024-02-07 LAB
ALBUMIN SERPL BCP-MCNC: 3.5 G/DL (ref 3.4–5)
ALP SERPL-CCNC: 107 U/L (ref 33–136)
ALT SERPL W P-5'-P-CCNC: 13 U/L (ref 10–52)
ANION GAP SERPL CALC-SCNC: 12 MMOL/L (ref 10–20)
AST SERPL W P-5'-P-CCNC: 12 U/L (ref 9–39)
BASOPHILS # BLD AUTO: 0.01 X10*3/UL (ref 0–0.1)
BASOPHILS NFR BLD AUTO: 0.1 %
BILIRUB DIRECT SERPL-MCNC: 0.1 MG/DL (ref 0–0.3)
BILIRUB SERPL-MCNC: 0.6 MG/DL (ref 0–1.2)
BUN SERPL-MCNC: 26 MG/DL (ref 6–23)
CALCIUM SERPL-MCNC: 9.2 MG/DL (ref 8.6–10.6)
CHLORIDE SERPL-SCNC: 110 MMOL/L (ref 98–107)
CO2 SERPL-SCNC: 23 MMOL/L (ref 21–32)
CREAT SERPL-MCNC: 0.89 MG/DL (ref 0.5–1.3)
EGFRCR SERPLBLD CKD-EPI 2021: 88 ML/MIN/1.73M*2
EOSINOPHIL # BLD AUTO: 0 X10*3/UL (ref 0–0.4)
EOSINOPHIL NFR BLD AUTO: 0 %
ERYTHROCYTE [DISTWIDTH] IN BLOOD BY AUTOMATED COUNT: 14.7 % (ref 11.5–14.5)
GLUCOSE SERPL-MCNC: 143 MG/DL (ref 74–99)
HCT VFR BLD AUTO: 32.5 % (ref 41–52)
HGB BLD-MCNC: 10.4 G/DL (ref 13.5–17.5)
IMM GRANULOCYTES # BLD AUTO: 0.47 X10*3/UL (ref 0–0.5)
IMM GRANULOCYTES NFR BLD AUTO: 4.7 % (ref 0–0.9)
LYMPHOCYTES # BLD AUTO: 0.34 X10*3/UL (ref 0.8–3)
LYMPHOCYTES NFR BLD AUTO: 3.4 %
MAGNESIUM SERPL-MCNC: 2.27 MG/DL (ref 1.6–2.4)
MCH RBC QN AUTO: 34.4 PG (ref 26–34)
MCHC RBC AUTO-ENTMCNC: 32 G/DL (ref 32–36)
MCV RBC AUTO: 108 FL (ref 80–100)
MONOCYTES # BLD AUTO: 0.25 X10*3/UL (ref 0.05–0.8)
MONOCYTES NFR BLD AUTO: 2.5 %
NEUTROPHILS # BLD AUTO: 8.87 X10*3/UL (ref 1.6–5.5)
NEUTROPHILS NFR BLD AUTO: 89.3 %
NRBC BLD-RTO: 0 /100 WBCS (ref 0–0)
PHOSPHATE SERPL-MCNC: 2.5 MG/DL (ref 2.5–4.9)
PLATELET # BLD AUTO: 151 X10*3/UL (ref 150–450)
POTASSIUM SERPL-SCNC: 4.3 MMOL/L (ref 3.5–5.3)
PROT SERPL-MCNC: 6.2 G/DL (ref 6.4–8.2)
RBC # BLD AUTO: 3.02 X10*6/UL (ref 4.5–5.9)
SODIUM SERPL-SCNC: 141 MMOL/L (ref 136–145)
URATE SERPL-MCNC: 5.6 MG/DL (ref 4–7.5)
WBC # BLD AUTO: 9.9 X10*3/UL (ref 4.4–11.3)

## 2024-02-07 PROCEDURE — 2500000004 HC RX 250 GENERAL PHARMACY W/ HCPCS (ALT 636 FOR OP/ED): Mod: MUE | Performed by: PHYSICIAN ASSISTANT

## 2024-02-07 PROCEDURE — 3E04305 INTRODUCTION OF OTHER ANTINEOPLASTIC INTO CENTRAL VEIN, PERCUTANEOUS APPROACH: ICD-10-PCS | Performed by: INTERNAL MEDICINE

## 2024-02-07 PROCEDURE — 30243Y0 TRANSFUSION OF AUTOLOGOUS HEMATOPOIETIC STEM CELLS INTO CENTRAL VEIN, PERCUTANEOUS APPROACH: ICD-10-PCS | Performed by: INTERNAL MEDICINE

## 2024-02-07 PROCEDURE — 38241 TRANSPLT AUTOL HCT/DONOR: CPT

## 2024-02-07 PROCEDURE — 85025 COMPLETE CBC W/AUTO DIFF WBC: CPT | Performed by: PHYSICIAN ASSISTANT

## 2024-02-07 PROCEDURE — 38208 THAW PRESERVED STEM CELLS: CPT

## 2024-02-07 PROCEDURE — 84100 ASSAY OF PHOSPHORUS: CPT | Performed by: PHYSICIAN ASSISTANT

## 2024-02-07 PROCEDURE — 2500000001 HC RX 250 WO HCPCS SELF ADMINISTERED DRUGS (ALT 637 FOR MEDICARE OP): Performed by: PHYSICIAN ASSISTANT

## 2024-02-07 PROCEDURE — 84550 ASSAY OF BLOOD/URIC ACID: CPT | Performed by: PHYSICIAN ASSISTANT

## 2024-02-07 PROCEDURE — 2500000004 HC RX 250 GENERAL PHARMACY W/ HCPCS (ALT 636 FOR OP/ED): Performed by: INTERNAL MEDICINE

## 2024-02-07 PROCEDURE — 1170000001 HC PRIVATE ONCOLOGY ROOM DAILY

## 2024-02-07 PROCEDURE — 99233 SBSQ HOSP IP/OBS HIGH 50: CPT | Performed by: INTERNAL MEDICINE

## 2024-02-07 PROCEDURE — 83735 ASSAY OF MAGNESIUM: CPT | Performed by: PHYSICIAN ASSISTANT

## 2024-02-07 PROCEDURE — 82248 BILIRUBIN DIRECT: CPT | Performed by: PHYSICIAN ASSISTANT

## 2024-02-07 PROCEDURE — 2500000001 HC RX 250 WO HCPCS SELF ADMINISTERED DRUGS (ALT 637 FOR MEDICARE OP): Performed by: INTERNAL MEDICINE

## 2024-02-07 PROCEDURE — 2500000001 HC RX 250 WO HCPCS SELF ADMINISTERED DRUGS (ALT 637 FOR MEDICARE OP): Performed by: NURSE PRACTITIONER

## 2024-02-07 RX ORDER — ACYCLOVIR 400 MG/1
400 TABLET ORAL EVERY 12 HOURS
Status: DISCONTINUED | OUTPATIENT
Start: 2024-02-07 | End: 2024-02-22 | Stop reason: HOSPADM

## 2024-02-07 RX ORDER — FLUCONAZOLE 200 MG/1
400 TABLET ORAL DAILY
Status: DISCONTINUED | OUTPATIENT
Start: 2024-02-08 | End: 2024-02-22 | Stop reason: HOSPADM

## 2024-02-07 RX ORDER — DIPHENHYDRAMINE HCL 25 MG
25 CAPSULE ORAL ONCE
Status: COMPLETED | OUTPATIENT
Start: 2024-02-07 | End: 2024-02-07

## 2024-02-07 RX ORDER — LEVOFLOXACIN 500 MG/1
500 TABLET, FILM COATED ORAL DAILY
Status: DISCONTINUED | OUTPATIENT
Start: 2024-02-07 | End: 2024-02-15

## 2024-02-07 RX ORDER — LORAZEPAM 1 MG/1
1 TABLET ORAL ONCE AS NEEDED
Status: ACTIVE | OUTPATIENT
Start: 2024-02-07 | End: 2024-02-07

## 2024-02-07 RX ADMIN — HYDROCORTISONE SODIUM SUCCINATE 100 MG: 100 INJECTION, POWDER, FOR SOLUTION INTRAMUSCULAR; INTRAVENOUS at 11:07

## 2024-02-07 RX ADMIN — GABAPENTIN 600 MG: 300 CAPSULE ORAL at 20:49

## 2024-02-07 RX ADMIN — ALLOPURINOL 300 MG: 300 TABLET ORAL at 08:43

## 2024-02-07 RX ADMIN — GABAPENTIN 300 MG: 300 CAPSULE ORAL at 08:42

## 2024-02-07 RX ADMIN — ALLOPURINOL 300 MG: 300 TABLET ORAL at 20:50

## 2024-02-07 RX ADMIN — ACYCLOVIR 400 MG: 400 TABLET ORAL at 08:43

## 2024-02-07 RX ADMIN — GABAPENTIN 300 MG: 300 CAPSULE ORAL at 13:51

## 2024-02-07 RX ADMIN — PANTOPRAZOLE SODIUM 40 MG: 40 TABLET, DELAYED RELEASE ORAL at 08:46

## 2024-02-07 RX ADMIN — SODIUM CHLORIDE 100 ML/HR: 9 INJECTION, SOLUTION INTRAVENOUS at 05:07

## 2024-02-07 RX ADMIN — Medication 10 MG: at 20:50

## 2024-02-07 RX ADMIN — ACYCLOVIR 400 MG: 400 TABLET ORAL at 20:50

## 2024-02-07 RX ADMIN — DULOXETINE HYDROCHLORIDE 30 MG: 30 CAPSULE, DELAYED RELEASE ORAL at 20:49

## 2024-02-07 RX ADMIN — DIPHENHYDRAMINE HYDROCHLORIDE 25 MG: 25 CAPSULE ORAL at 11:07

## 2024-02-07 RX ADMIN — LEVOFLOXACIN 500 MG: 500 TABLET, FILM COATED ORAL at 08:43

## 2024-02-07 RX ADMIN — PROCHLORPERAZINE MALEATE 10 MG: 10 TABLET ORAL at 11:06

## 2024-02-07 ASSESSMENT — COGNITIVE AND FUNCTIONAL STATUS - GENERAL
DAILY ACTIVITIY SCORE: 24
DAILY ACTIVITIY SCORE: 24
MOBILITY SCORE: 24
MOBILITY SCORE: 24

## 2024-02-07 ASSESSMENT — PAIN SCALES - GENERAL
PAINLEVEL_OUTOF10: 0 - NO PAIN
PAINLEVEL_OUTOF10: 0 - NO PAIN

## 2024-02-07 ASSESSMENT — PAIN - FUNCTIONAL ASSESSMENT: PAIN_FUNCTIONAL_ASSESSMENT: 0-10

## 2024-02-07 NOTE — PROGRESS NOTES
"David Ferreira is a 78 y.o. male on day 2 of admission presenting with IgG multiple myeloma (CMS/HCC).    Subjective   No acute events over night. Afebrile, VSS. Having BM's. Eating and drinking okay. Tolerated his stem cell infusion well today. ROS otherwise unremarkable. Wife and daughter at bedside.        Objective     Physical Exam  Constitutional:       Appearance: Normal appearance.   HENT:      Head: Normocephalic and atraumatic.      Nose: Nose normal.      Mouth/Throat:      Mouth: Mucous membranes are moist.      Pharynx: Oropharynx is clear.   Eyes:      Extraocular Movements: Extraocular movements intact.      Pupils: Pupils are equal, round, and reactive to light.   Cardiovascular:      Rate and Rhythm: Normal rate and regular rhythm.      Pulses: Normal pulses.      Heart sounds: Normal heart sounds.   Pulmonary:      Effort: Pulmonary effort is normal. No respiratory distress.      Breath sounds: Normal breath sounds. No wheezing.   Abdominal:      General: Bowel sounds are normal. There is no distension.      Palpations: Abdomen is soft.      Tenderness: There is no abdominal tenderness.   Musculoskeletal:         General: No swelling. Normal range of motion.      Cervical back: Normal range of motion.   Skin:     General: Skin is warm and dry.      Capillary Refill: Capillary refill takes less than 2 seconds.   Neurological:      General: No focal deficit present.      Mental Status: He is alert and oriented to person, place, and time.         Last Recorded Vitals  Blood pressure 130/80, pulse 76, temperature 36.2 °C (97.2 °F), temperature source Temporal, resp. rate 18, height 1.651 m (5' 5\"), weight 75.2 kg (165 lb 12.6 oz), SpO2 96 %.  Intake/Output last 3 Shifts:  I/O last 3 completed shifts:  In: 443.5 (6.1 mL/kg) [I.V.:90 (1.2 mL/kg); IV Piggyback:353.5]  Out: - (0 mL/kg)   Weight: 72.8 kg     Relevant Results  Scheduled medications  acyclovir, 400 mg, oral, q12h  allopurinol, 300 mg, oral, " BID  DULoxetine, 30 mg, oral, Nightly  [START ON 2/12/2024] filgrastim or biosimilar, 480 mcg, subcutaneous, q24h  [START ON 2/8/2024] fluconazole, 400 mg, oral, Daily  gabapentin, 300 mg, oral, BID  gabapentin, 600 mg, oral, Nightly  levoFLOXacin, 500 mg, oral, Daily  melatonin, 10 mg, oral, Nightly  pantoprazole, 40 mg, oral, Daily before breakfast      Continuous medications  sodium chloride 0.9%, 100 mL/hr, Last Rate: 100 mL/hr (02/07/24 0507)      PRN medications  PRN medications: albuterol, alteplase, capsaicin, dextrose, diphenhydrAMINE, EPINEPHrine HCl, famotidine, LORazepam, methylPREDNISolone sodium succinate (PF), prochlorperazine, prochlorperazine, sodium chloride    Results for orders placed or performed during the hospital encounter of 02/05/24 (from the past 24 hour(s))   Uric Acid   Result Value Ref Range    Uric Acid 5.6 4.0 - 7.5 mg/dL   CBC and Auto Differential   Result Value Ref Range    WBC 9.9 4.4 - 11.3 x10*3/uL    nRBC 0.0 0.0 - 0.0 /100 WBCs    RBC 3.02 (L) 4.50 - 5.90 x10*6/uL    Hemoglobin 10.4 (L) 13.5 - 17.5 g/dL    Hematocrit 32.5 (L) 41.0 - 52.0 %     (H) 80 - 100 fL    MCH 34.4 (H) 26.0 - 34.0 pg    MCHC 32.0 32.0 - 36.0 g/dL    RDW 14.7 (H) 11.5 - 14.5 %    Platelets 151 150 - 450 x10*3/uL    Neutrophils % 89.3 40.0 - 80.0 %    Immature Granulocytes %, Automated 4.7 (H) 0.0 - 0.9 %    Lymphocytes % 3.4 13.0 - 44.0 %    Monocytes % 2.5 2.0 - 10.0 %    Eosinophils % 0.0 0.0 - 6.0 %    Basophils % 0.1 0.0 - 2.0 %    Neutrophils Absolute 8.87 (H) 1.60 - 5.50 x10*3/uL    Immature Granulocytes Absolute, Automated 0.47 0.00 - 0.50 x10*3/uL    Lymphocytes Absolute 0.34 (L) 0.80 - 3.00 x10*3/uL    Monocytes Absolute 0.25 0.05 - 0.80 x10*3/uL    Eosinophils Absolute 0.00 0.00 - 0.40 x10*3/uL    Basophils Absolute 0.01 0.00 - 0.10 x10*3/uL   Magnesium   Result Value Ref Range    Magnesium 2.27 1.60 - 2.40 mg/dL   Hepatic Function Panel   Result Value Ref Range    Albumin 3.5 3.4 - 5.0  g/dL    Bilirubin, Total 0.6 0.0 - 1.2 mg/dL    Bilirubin, Direct 0.1 0.0 - 0.3 mg/dL    Alkaline Phosphatase 107 33 - 136 U/L    ALT 13 10 - 52 U/L    AST 12 9 - 39 U/L    Total Protein 6.2 (L) 6.4 - 8.2 g/dL   Phosphorus   Result Value Ref Range    Phosphorus 2.5 2.5 - 4.9 mg/dL   Basic Metabolic Panel   Result Value Ref Range    Glucose 143 (H) 74 - 99 mg/dL    Sodium 141 136 - 145 mmol/L    Potassium 4.3 3.5 - 5.3 mmol/L    Chloride 110 (H) 98 - 107 mmol/L    Bicarbonate 23 21 - 32 mmol/L    Anion Gap 12 10 - 20 mmol/L    Urea Nitrogen 26 (H) 6 - 23 mg/dL    Creatinine 0.89 0.50 - 1.30 mg/dL    eGFR 88 >60 mL/min/1.73m*2    Calcium 9.2 8.6 - 10.6 mg/dL       IR CVC nontunneled    Result Date: 1/29/2024  Interpreted By:  Mono Reyna,  and Goldie Hicks STUDY: IR CVC NONTUNNELED;  1/29/2024 10:01 am   INDICATION: Signs/Symptoms:PBSCT Auto.   COMPARISON: None.   ACCESSION NUMBER(S): [id number]   ORDERING CLINICIAN: GILDA MARTINEZ   TECHNIQUE: INTERVENTIONALIST(S): Mono Reyna MD   CONSENT: The patient/patient's POA/next of kin was informed of the nature of the proposed procedure. The purposes, alternatives, risks, and benefits were explained and discussed. All questions were answered and consent was obtained.   RADIATION EXPOSURE: Fluoroscopy time: 0.4 min Dose: 4.21 mGy Dose Area Product (DAP): 1632 mGy*cm^2   SEDATION: Moderate conscious IV sedation services (supervision of administration, induction, and maintenance) were provided by the physician performing the procedure with intravenous fentanyl 50mcg and versed 1 mg. The physician was assisted by an independent trained observer, an interventional radiology nurse, in the continuous monitoring of patient level of consciousness and physiologic status.   MEDICATION: None.   TIME OUT: A time out was performed immediately prior to procedure start with the interventional team, correctly identifying the patient name, date of birth, MRN, procedure,  anatomy (including marking of site and side), patient position, procedure consent form, relevant laboratory and imaging test results, safety precautions, and procedure-specific equipment needs.   COMPLICATIONS: No immediate adverse events identified.   FINDINGS: The patient was positioned supine on the angiography table. The right supraclavicular cutaneous tissues were prepared and draped in sterile manner.  1% lidocaine local anesthesia was instilled into the subcutaneous soft tissues at the selected access site for local anesthesia. Ultrasound images demonstrate a patent and compressible right  internal jugular vein. Utilizing direct ultrasound guidance and micropuncture/Seldinger technique, the  right  internal jugular vein was accessed. An ultrasound digital spot image was acquired and stored on the  PACS. After confirmation of location, a 018 [place]-Mandrel guidewire was introduced and advanced into the inferior vena cava utilizing intermittent fluoroscopy.   The needle was removed with the guidewire left in place and exchanged for a 5-Icelandic coaxial dilator.  The inner dilator and wire were removed and a J-tipped 035 guidewire was introduced through the access sheath.  The skin tract was dilated with successive increase in size of vascular dilators under direct fluoroscopic visualization.   Subsequently, a temporary 13 Icelandic catheter was advanced with its tip overlying the right atrium under direct fluoroscopic guidance. The catheter ports were aspirated and flushed with normal saline and charged with heparin. The external portions of the catheter were secured in place with sterile suture dressings.   The patient tolerated the procedure without complication.       1. Technically successful and uncomplicated placement of a right internal jugular temporary dialysis catheter under direct ultrasound and fluoroscopic guidance - optimal catheter tip position at the right atrium and the catheter is ready for  immediate use.   I personally reviewed the images/study and I agree with the findings as stated by Fabiola Amezcua DO, PGY-2. This study was interpreted at [facility], [place], [place].   Performed and dictated at [facility].   MACRO: None.   Signed by: Mono Reyna 1/29/2024 2:44 PM Dictation workstation:   [id number]    XR chest 2 views    Result Date: 1/26/2024  Interpreted By:  Candido Ayala and Ritchie Brandon STUDY: XR CHEST 2 VIEWS;  1/25/2024 10:35 am   INDICATION: Signs/Symptoms:PBSCT Auto.   COMPARISON: None.   ACCESSION NUMBER(S): [id number]   ORDERING CLINICIAN: GILDA MARTINEZ   FINDINGS: PA and lateral radiographs of the chest were provided.   CARDIOMEDIASTINAL SILHOUETTE: Cardiomediastinal silhouette is normal in size and configuration. There is mild calcification of the aortic arch   LUNGS: Lungs are clear.   ABDOMEN: No remarkable upper abdominal findings.   BONES: No acute osseous changes.       1.  No evidence of acute cardiopulmonary process.   I personally reviewed the images/study and I agree with the findings as stated by resident Michoacano Rivera. This study was interpreted at [facility], [place], [place].   MACRO: None   Signed by: Candido Ayala 1/26/2024 10:50 AM Dictation workstation:   [id number]          This patient has a central line   Reason for the central line remaining today? Parenteral medication        Assessment/Plan   Principal Problem:    IgG multiple myeloma (CMS/HCC)  Active Problems:    Kappa light chain myeloma (CMS/HCC)    David Ferreira is a 78 y.o. male with IgG [place] Myeloma in [place] admitted for his Autologous SCT with Sonja 140 (T=0, 2/07/24)     Currently T=0 today    ONC:  -Hx/o IgG Kappa Multiple Myeloma  -Initially evaluated for elevated protein  -Bmbx c/w 30-40% kappa restricted plasma cells. FISH neg.   -CT/PET neg  -B2M 3.8, IgG 6072, Kappa LC  144 mg/L, K/L ratio 589, normal Ca, Cr, LDH, alb  -3/2023 Started Velcade, Revlimid, and Dex  -Switched to Pratibha, Kyprolis, and Dex 5/2023 with slow response to therapy  - Now in MD  - Collected stem cells in 1 day on 1/29/24  - Now admitted for his Autologous SCT (2/07/24)    CHEMO:  -Melphalan 140 mg/m2 T-1     HEME:  - Asymptomatic anemia and thrombocytopenia  - Monitor CBC and transfuse as needed      ID:  - Afebrile  - Plan ACV and Fluconazole prophy per protocol  - Plan Zosyn with fever     FEN/GI:  - Admit wt: 73.9 kg current wt: 75.2 kg   - Added PPI  - Monitor electrolytes and replete as needed  - Plan to decr IVF's to 50ml/hr 4 hrs after stem cell infusion today     CV:  ECHO (11/7/23) with EF of 65%     MSK:  - Rt Rotator cuff tear  - Cont Capsaicin as needed     NEURO:  - 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 Cont Gabapentin. - - Continue Cymbalta 30mg at bedtime per Dr. Contreras's recs.     MSC:  - Cont bedtime Melatonin for sleep     Patient seen, examined and discussed with MD Leigh Castro PA-C

## 2024-02-07 NOTE — PROGRESS NOTES
Spiritual Care Visit    Clinical Encounter Type  Visited With: Patient  Routine Visit: Introduction  Continue Visiting: Yes  Referral From: Nurse  Referral To:      Taxonomy  Intended Effects: Aligning care plan with patient's values, Demonstrate caring and concern, Establish rapport and connectedness, Idalia affirmation  Methods: Collaborate with care team member, Encourage sharing of feelings, Encourage story-telling, Explore idalia and values, Offer support  Interventions: Acknowledge current situation, Active listening, Discuss concerns, Discuss spirituality/Muslim with someone, Identify supportive relationship(s), [place]     introduced self and role to patient David Ferreira. Patient shared that he was doing well and reflected on his idalia and how God has brought him through so much, especially this last year as he has undergone treatment for cancer. Patient shared stories that highlighted God's presence and brian in his life and the gratitude that he has. Patient feels hopeful about his transplant and is most concerned about returning home to his family. Patient has very good support from family and friends; he and his spouse have been  for 53 years.  provided support through reflective listening, affirmation of idalia expressions, supportive conversation, and prayer. Patient was appreciative of visit and did not have any further needs at this time. Spiritual Care remains available as needed/requested.    Rev. Carmen Hogue MDiv, [facility]

## 2024-02-07 NOTE — CARE PLAN
The clinical goals for the shift include patient will remain free from injuries and falls throughout shift      Problem: Fall/Injury  Goal: Not fall by end of shift  Outcome: Progressing  Goal: Be free from injury by end of the shift  Outcome: Progressing  Goal: Verbalize understanding of personal risk factors for fall in the hospital  Outcome: Progressing  Goal: Verbalize understanding of risk factor reduction measures to prevent injury from fall in the home  Outcome: Progressing  Goal: Use assistive devices by end of the shift  Outcome: Progressing  Goal: Pace activities to prevent fatigue by end of the shift  Outcome: Progressing

## 2024-02-07 NOTE — NURSING NOTE
HPC-A (Autologous) product [id number] delivered by [facility] personnel and verified at the beside with Shandra Maldonado. Patient premedicated per orders. Patient identification verified against product with a second RN, Jess Ferrari, using name, MRN, and . Dr. Contreras present at start of infusion. Product infused via Alaris pump at a rate of 250ml/hr through purple lumen catheter, +BBR obtained prior to and following infusion. Infusion started at 1218 and completed at 1242. Patient received a total of 90ml and a total dose of  7.1 x 10e6/kg CD34 (autos and allos)   per WKS 0002.  Patient  monitored throughout and vitals remained stable throughout infusion. No complications noted. Patient instructed not to ambulate without supervision until cleared by medical team. Patient verbalized understanding of this safety measure. Patient to be monitored for 4 hours following infusion.

## 2024-02-07 NOTE — SIGNIFICANT EVENT
02/07/24 0443   Prechemo Checklist   Has the patient been in the hospital, ED, or urgent care since last date of service N/A   Chemo/Immuno Consent Signed Yes   Protocol/Indications Verified Yes   Confirmed to previous date/time of medication Yes   Compared to previous dose Yes   All medications are dated accurately Yes   Pregnancy Test Negative Not applicable   Parameters Met Yes   Dose Calculations Verified Yes

## 2024-02-07 NOTE — CARE PLAN
The patient's goals for the shift include    Problem: Fall/Injury  Goal: Not fall by end of shift  Outcome: Progressing  Goal: Be free from injury by end of the shift  Outcome: Progressing  Goal: Verbalize understanding of personal risk factors for fall in the hospital  Outcome: Progressing  Goal: Verbalize understanding of risk factor reduction measures to prevent injury from fall in the home  Outcome: Progressing  Goal: Use assistive devices by end of the shift  Outcome: Progressing  Goal: Pace activities to prevent fatigue by end of the shift  Outcome: Progressing       The clinical goals for the shift include pt will remain free from falls and injury throughout shift.

## 2024-02-08 LAB
ALBUMIN SERPL BCP-MCNC: 3.5 G/DL (ref 3.4–5)
ALP SERPL-CCNC: 77 U/L (ref 33–136)
ALT SERPL W P-5'-P-CCNC: 11 U/L (ref 10–52)
ANION GAP SERPL CALC-SCNC: 10 MMOL/L (ref 10–20)
AST SERPL W P-5'-P-CCNC: 17 U/L (ref 9–39)
BASOPHILS # BLD AUTO: 0.01 X10*3/UL (ref 0–0.1)
BASOPHILS NFR BLD AUTO: 0.1 %
BILIRUB DIRECT SERPL-MCNC: 0.1 MG/DL (ref 0–0.3)
BILIRUB SERPL-MCNC: 0.6 MG/DL (ref 0–1.2)
BUN SERPL-MCNC: 26 MG/DL (ref 6–23)
CALCIUM SERPL-MCNC: 8.6 MG/DL (ref 8.6–10.6)
CHLORIDE SERPL-SCNC: 112 MMOL/L (ref 98–107)
CO2 SERPL-SCNC: 25 MMOL/L (ref 21–32)
CREAT SERPL-MCNC: 0.81 MG/DL (ref 0.5–1.3)
EGFRCR SERPLBLD CKD-EPI 2021: >90 ML/MIN/1.73M*2
EOSINOPHIL # BLD AUTO: 0 X10*3/UL (ref 0–0.4)
EOSINOPHIL NFR BLD AUTO: 0 %
ERYTHROCYTE [DISTWIDTH] IN BLOOD BY AUTOMATED COUNT: 15.2 % (ref 11.5–14.5)
GLUCOSE SERPL-MCNC: 96 MG/DL (ref 74–99)
HCT VFR BLD AUTO: 29.3 % (ref 41–52)
HGB BLD-MCNC: 9.4 G/DL (ref 13.5–17.5)
IMM GRANULOCYTES # BLD AUTO: 0.12 X10*3/UL (ref 0–0.5)
IMM GRANULOCYTES NFR BLD AUTO: 1.3 % (ref 0–0.9)
LYMPHOCYTES # BLD AUTO: 0.19 X10*3/UL (ref 0.8–3)
LYMPHOCYTES NFR BLD AUTO: 2 %
MAGNESIUM SERPL-MCNC: 2.24 MG/DL (ref 1.6–2.4)
MCH RBC QN AUTO: 34.7 PG (ref 26–34)
MCHC RBC AUTO-ENTMCNC: 32.1 G/DL (ref 32–36)
MCV RBC AUTO: 108 FL (ref 80–100)
MONOCYTES # BLD AUTO: 0.22 X10*3/UL (ref 0.05–0.8)
MONOCYTES NFR BLD AUTO: 2.3 %
NEUTROPHILS # BLD AUTO: 8.84 X10*3/UL (ref 1.6–5.5)
NEUTROPHILS NFR BLD AUTO: 94.3 %
NRBC BLD-RTO: 0 /100 WBCS (ref 0–0)
PHOSPHATE SERPL-MCNC: 2.9 MG/DL (ref 2.5–4.9)
PLATELET # BLD AUTO: 130 X10*3/UL (ref 150–450)
POTASSIUM SERPL-SCNC: 3.8 MMOL/L (ref 3.5–5.3)
PROT SERPL-MCNC: 5.6 G/DL (ref 6.4–8.2)
RBC # BLD AUTO: 2.71 X10*6/UL (ref 4.5–5.9)
SODIUM SERPL-SCNC: 143 MMOL/L (ref 136–145)
URATE SERPL-MCNC: 4.7 MG/DL (ref 4–7.5)
WBC # BLD AUTO: 9.4 X10*3/UL (ref 4.4–11.3)

## 2024-02-08 PROCEDURE — 2500000004 HC RX 250 GENERAL PHARMACY W/ HCPCS (ALT 636 FOR OP/ED): Performed by: PHYSICIAN ASSISTANT

## 2024-02-08 PROCEDURE — 99233 SBSQ HOSP IP/OBS HIGH 50: CPT | Performed by: INTERNAL MEDICINE

## 2024-02-08 PROCEDURE — 84550 ASSAY OF BLOOD/URIC ACID: CPT | Performed by: PHYSICIAN ASSISTANT

## 2024-02-08 PROCEDURE — 2500000001 HC RX 250 WO HCPCS SELF ADMINISTERED DRUGS (ALT 637 FOR MEDICARE OP): Performed by: NURSE PRACTITIONER

## 2024-02-08 PROCEDURE — 83735 ASSAY OF MAGNESIUM: CPT | Performed by: PHYSICIAN ASSISTANT

## 2024-02-08 PROCEDURE — 82248 BILIRUBIN DIRECT: CPT | Performed by: PHYSICIAN ASSISTANT

## 2024-02-08 PROCEDURE — 2500000001 HC RX 250 WO HCPCS SELF ADMINISTERED DRUGS (ALT 637 FOR MEDICARE OP): Performed by: INTERNAL MEDICINE

## 2024-02-08 PROCEDURE — 2500000001 HC RX 250 WO HCPCS SELF ADMINISTERED DRUGS (ALT 637 FOR MEDICARE OP): Performed by: PHYSICIAN ASSISTANT

## 2024-02-08 PROCEDURE — 80053 COMPREHEN METABOLIC PANEL: CPT | Performed by: PHYSICIAN ASSISTANT

## 2024-02-08 PROCEDURE — 84100 ASSAY OF PHOSPHORUS: CPT | Performed by: PHYSICIAN ASSISTANT

## 2024-02-08 PROCEDURE — 1170000001 HC PRIVATE ONCOLOGY ROOM DAILY

## 2024-02-08 PROCEDURE — 85025 COMPLETE CBC W/AUTO DIFF WBC: CPT | Performed by: PHYSICIAN ASSISTANT

## 2024-02-08 RX ADMIN — DULOXETINE HYDROCHLORIDE 30 MG: 30 CAPSULE, DELAYED RELEASE ORAL at 21:59

## 2024-02-08 RX ADMIN — PROCHLORPERAZINE MALEATE 10 MG: 10 TABLET ORAL at 12:34

## 2024-02-08 RX ADMIN — ACYCLOVIR 400 MG: 400 TABLET ORAL at 08:06

## 2024-02-08 RX ADMIN — ALLOPURINOL 300 MG: 300 TABLET ORAL at 21:59

## 2024-02-08 RX ADMIN — ACYCLOVIR 400 MG: 400 TABLET ORAL at 21:59

## 2024-02-08 RX ADMIN — GABAPENTIN 600 MG: 300 CAPSULE ORAL at 21:59

## 2024-02-08 RX ADMIN — PANTOPRAZOLE SODIUM 40 MG: 40 TABLET, DELAYED RELEASE ORAL at 08:11

## 2024-02-08 RX ADMIN — GABAPENTIN 300 MG: 300 CAPSULE ORAL at 14:24

## 2024-02-08 RX ADMIN — FLUCONAZOLE 400 MG: 200 TABLET ORAL at 08:06

## 2024-02-08 RX ADMIN — GABAPENTIN 300 MG: 300 CAPSULE ORAL at 08:06

## 2024-02-08 RX ADMIN — ALLOPURINOL 300 MG: 300 TABLET ORAL at 08:06

## 2024-02-08 RX ADMIN — LEVOFLOXACIN 500 MG: 500 TABLET, FILM COATED ORAL at 08:06

## 2024-02-08 RX ADMIN — Medication 10 MG: at 21:59

## 2024-02-08 ASSESSMENT — COGNITIVE AND FUNCTIONAL STATUS - GENERAL
MOBILITY SCORE: 24
MOBILITY SCORE: 24
DAILY ACTIVITIY SCORE: 24
DAILY ACTIVITIY SCORE: 24

## 2024-02-08 ASSESSMENT — PAIN - FUNCTIONAL ASSESSMENT: PAIN_FUNCTIONAL_ASSESSMENT: 0-10

## 2024-02-08 ASSESSMENT — PAIN SCALES - GENERAL
PAINLEVEL_OUTOF10: 0 - NO PAIN
PAINLEVEL_OUTOF10: 0 - NO PAIN

## 2024-02-08 NOTE — HOSPITAL COURSE
David Ferreira is a 78 y.o. 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    Hospital course was complicated by:    1. chemotherapy-induced nausea and vomiting, managed with IV and oral anti-emetics, stable and tolerating PO at discharge  2. Diverticulitis, initially presented w/ abdominal pain, and demonstrated on CT AP. He was culture negative but treated w/ 7 days of IV zosyn and sent home on 7 days of flagy and levofloxacin.   3. Deconditioning, managed w/ PT while admitted, ordered home PT on discharge.  4. RUE edema 2/2 fluid overload and immobility of th R extremity due to a rotator cuff injury. Duplex sonography ordered while inpatient but cancelled at patient request due to delay in discharge. Patient and family reporting considerable improvement in RUE edema. Plan for outpatient US pending further evaluation by primary oncologist.      Will go home on acyclovir prophylaxis, and plan to start TMP/SMX at T+30.       He will follow up in twice weekly in the post-BMT clinic, starting on 2/24, and with Dr. Cerrato on 3/1.        Tetracycline Counseling: Patient counseled regarding possible photosensitivity and increased risk for sunburn.  Patient instructed to avoid sunlight, if possible.  When exposed to sunlight, patients should wear protective clothing, sunglasses, and sunscreen.  The patient was instructed to call the office immediately if the following severe adverse effects occur:  hearing changes, easy bruising/bleeding, severe headache, or vision changes.  The patient verbalized understanding of the proper use and possible adverse effects of tetracycline.  All of the patient's questions and concerns were addressed. Patient understands to avoid pregnancy while on therapy due to potential birth defects. Isotretinoin Pregnancy And Lactation Text: This medication is Pregnancy Category X and is considered extremely dangerous during pregnancy. It is unknown if it is excreted in breast milk. Dapsone Counseling: I discussed with the patient the risks of dapsone including but not limited to hemolytic anemia, agranulocytosis, rashes, methemoglobinemia, kidney failure, peripheral neuropathy, headaches, GI upset, and liver toxicity.  Patients who start dapsone require monitoring including baseline LFTs and weekly CBCs for the first month, then every month thereafter.  The patient verbalized understanding of the proper use and possible adverse effects of dapsone.  All of the patient's questions and concerns were addressed. Azelaic Acid Pregnancy And Lactation Text: This medication is considered safe during pregnancy and breast feeding. Erythromycin Pregnancy And Lactation Text: This medication is Pregnancy Category B and is considered safe during pregnancy. It is also excreted in breast milk. Spironolactone Counseling: Patient advised regarding risks of diarrhea, abdominal pain, hyperkalemia, birth defects (for female patients), liver toxicity and renal toxicity. The patient may need blood work to monitor liver and kidney function and potassium levels while on therapy. The patient verbalized understanding of the proper use and possible adverse effects of spironolactone.  All of the patient's questions and concerns were addressed. Birth Control Pills Counseling: Birth Control Pill Counseling: I discussed with the patient the potential side effects of OCPs including but not limited to increased risk of stroke, heart attack, thrombophlebitis, deep venous thrombosis, hepatic adenomas, breast changes, GI upset, headaches, and depression.  The patient verbalized understanding of the proper use and possible adverse effects of OCPs. All of the patient's questions and concerns were addressed. Azithromycin Counseling:  I discussed with the patient the risks of azithromycin including but not limited to GI upset, allergic reaction, drug rash, diarrhea, and yeast infections. Benzoyl Peroxide Pregnancy And Lactation Text: This medication is Pregnancy Category C. It is unknown if benzoyl peroxide is excreted in breast milk. Winlevi Counseling:  I discussed with the patient the risks of topical clascoterone including but not limited to erythema, scaling, itching, and stinging. Patient voiced their understanding. Topical Clindamycin Counseling: Patient counseled that this medication may cause skin irritation or allergic reactions.  In the event of skin irritation, the patient was advised to reduce the amount of the drug applied or use it less frequently.   The patient verbalized understanding of the proper use and possible adverse effects of clindamycin.  All of the patient's questions and concerns were addressed. Sarecycline Counseling: Patient advised regarding possible photosensitivity and discoloration of the teeth, skin, lips, tongue and gums.  Patient instructed to avoid sunlight, if possible.  When exposed to sunlight, patients should wear protective clothing, sunglasses, and sunscreen.  The patient was instructed to call the office immediately if the following severe adverse effects occur:  hearing changes, easy bruising/bleeding, severe headache, or vision changes.  The patient verbalized understanding of the proper use and possible adverse effects of sarecycline.  All of the patient's questions and concerns were addressed. Topical Sulfur Applications Counseling: Topical Sulfur Counseling: Patient counseled that this medication may cause skin irritation or allergic reactions.  In the event of skin irritation, the patient was advised to reduce the amount of the drug applied or use it less frequently.   The patient verbalized understanding of the proper use and possible adverse effects of topical sulfur application.  All of the patient's questions and concerns were addressed. Bactrim Counseling:  I discussed with the patient the risks of sulfa antibiotics including but not limited to GI upset, allergic reaction, drug rash, diarrhea, dizziness, photosensitivity, and yeast infections.  Rarely, more serious reactions can occur including but not limited to aplastic anemia, agranulocytosis, methemoglobinemia, blood dyscrasias, liver or kidney failure, lung infiltrates or desquamative/blistering drug rashes. Doxycycline Pregnancy And Lactation Text: This medication is Pregnancy Category D and not consider safe during pregnancy. It is also excreted in breast milk but is considered safe for shorter treatment courses. Dapsone Pregnancy And Lactation Text: This medication is Pregnancy Category C and is not considered safe during pregnancy or breast feeding. Include Pregnancy/Lactation Warning?: No Tetracycline Pregnancy And Lactation Text: This medication is Pregnancy Category D and not consider safe during pregnancy. It is also excreted in breast milk. Tazorac Counseling:  Patient advised that medication is irritating and drying.  Patient may need to apply sparingly and wash off after an hour before eventually leaving it on overnight.  The patient verbalized understanding of the proper use and possible adverse effects of tazorac.  All of the patient's questions and concerns were addressed. Minocycline Counseling: Patient advised regarding possible photosensitivity and discoloration of the teeth, skin, lips, tongue and gums.  Patient instructed to avoid sunlight, if possible.  When exposed to sunlight, patients should wear protective clothing, sunglasses, and sunscreen.  The patient was instructed to call the office immediately if the following severe adverse effects occur:  hearing changes, easy bruising/bleeding, severe headache, or vision changes.  The patient verbalized understanding of the proper use and possible adverse effects of minocycline.  All of the patient's questions and concerns were addressed. Aklief Pregnancy And Lactation Text: It is unknown if this medication is safe to use during pregnancy.  It is unknown if this medication is excreted in breast milk.  Breastfeeding women should use the topical cream on the smallest area of the skin for the shortest time needed while breastfeeding.  Do not apply to nipple and areola. Topical Retinoid counseling:  Patient advised to apply a pea-sized amount only at bedtime and wait 30 minutes after washing their face before applying.  If too drying, patient may add a non-comedogenic moisturizer. The patient verbalized understanding of the proper use and possible adverse effects of retinoids.  All of the patient's questions and concerns were addressed. Birth Control Pills Pregnancy And Lactation Text: This medication should be avoided if pregnant and for the first 30 days post-partum. High Dose Vitamin A Counseling: Side effects reviewed, pt to contact office should one occur. Spironolactone Pregnancy And Lactation Text: This medication can cause feminization of the male fetus and should be avoided during pregnancy. The active metabolite is also found in breast milk. Bactrim Pregnancy And Lactation Text: This medication is Pregnancy Category D and is known to cause fetal risk.  It is also excreted in breast milk. Detail Level: Detailed Benzoyl Peroxide Counseling: Patient counseled that medicine may cause skin irritation and bleach clothing.  In the event of skin irritation, the patient was advised to reduce the amount of the drug applied or use it less frequently.   The patient verbalized understanding of the proper use and possible adverse effects of benzoyl peroxide.  All of the patient's questions and concerns were addressed. Isotretinoin Counseling: Patient should get monthly blood tests, not donate blood, not drive at night if vision affected, not share medication, and not undergo elective surgery for 6 months after tx completed. Side effects reviewed, pt to contact office should one occur. Winlevi Pregnancy And Lactation Text: This medication is considered safe during pregnancy and breastfeeding. Azelaic Acid Counseling: Patient counseled that medicine may cause skin irritation and to avoid applying near the eyes.  In the event of skin irritation, the patient was advised to reduce the amount of the drug applied or use it less frequently.   The patient verbalized understanding of the proper use and possible adverse effects of azelaic acid.  All of the patient's questions and concerns were addressed. Tazorac Pregnancy And Lactation Text: This medication is not safe during pregnancy. It is unknown if this medication is excreted in breast milk. Topical Sulfur Applications Pregnancy And Lactation Text: This medication is Pregnancy Category C and has an unknown safety profile during pregnancy. It is unknown if this topical medication is excreted in breast milk. Azithromycin Pregnancy And Lactation Text: This medication is considered safe during pregnancy and is also secreted in breast milk. Erythromycin Counseling:  I discussed with the patient the risks of erythromycin including but not limited to GI upset, allergic reaction, drug rash, diarrhea, increase in liver enzymes, and yeast infections. Topical Clindamycin Pregnancy And Lactation Text: This medication is Pregnancy Category B and is considered safe during pregnancy. It is unknown if it is excreted in breast milk. Topical Retinoid Pregnancy And Lactation Text: This medication is Pregnancy Category C. It is unknown if this medication is excreted in breast milk. High Dose Vitamin A Pregnancy And Lactation Text: High dose vitamin A therapy is contraindicated during pregnancy and breast feeding. Aklief counseling:  Patient advised to apply a pea-sized amount only at bedtime and wait 30 minutes after washing their face before applying.  If too drying, patient may add a non-comedogenic moisturizer.  The most commonly reported side effects including irritation, redness, scaling, dryness, stinging, burning, itching, and increased risk of sunburn.  The patient verbalized understanding of the proper use and possible adverse effects of retinoids.  All of the patient's questions and concerns were addressed. Doxycycline Counseling:  Patient counseled regarding possible photosensitivity and increased risk for sunburn.  Patient instructed to avoid sunlight, if possible.  When exposed to sunlight, patients should wear protective clothing, sunglasses, and sunscreen.  The patient was instructed to call the office immediately if the following severe adverse effects occur:  hearing changes, easy bruising/bleeding, severe headache, or vision changes.  The patient verbalized understanding of the proper use and possible adverse effects of doxycycline.  All of the patient's questions and concerns were addressed.

## 2024-02-08 NOTE — CONSULTS
"Nutrition Initial Assessment:   Nutrition Assessment    ---->Reason for Assessment: Dietitian discretion (admit for transplant)    Patient is a 78 y.o. male with Myeloma, admitted for Auto SCT (T=0 on 02/07/24); today is T+1.    PMH/PSH: as above + peripheral neuropathy, R rotator cuff tear, carpal tunnel syndrome s/p surgical repair    Nutrition History:  This service met with pt earlier today, was sitting up in bed at time of visit with him.    Tells appetite at home is very good, typically eats 2-3 meals/day + snacks throughout the day. Gets various sources of protein throughout the day, such as eggs, meats, and dairy products. Both himself and his wife enjoy cooking.     Since admit, appetite has been good, thus far. This am for breakfast meal consumed 100% of his cold cereal + milk and juice. Denied any GI issues or trouble chewing/swallowing.    --Vitamin/Herbal Supplement Use: C, D, Zinc, Melatonin  --Food Allergies/Intolerances: none       Anthropometrics:  Height: 165.1 cm (5' 5\")   Weight: 76.6 kg (168 lb 14 oz)   BMI (Calculated): 28.1  IBW/kg (Dietitian Calculated): 61.8 kg  Percent of IBW: 124 %       Weight History:     Pt denied any significant weight losses or gains PTA, \"but it does go up and down by a few pounds sometimes.\"    Wt Readings per review of EMR:   02/08/24 02/05/24 76.6 kg (168 lb 14 oz) (current wt)  73.9 kg (~163 lb) (admit wt)   02/01/24 77.3 kg (170 lb 6.7 oz)   01/30/24 77.4 kg (170 lb 10.2 oz)   01/29/24 77.2 kg (170 lb 3.1 oz)   01/28/24 77.5 kg (170 lb 13.7 oz)   01/27/24 77.2 kg (170 lb 3.1 oz)   01/26/24 77.5 kg (170 lb 13.7 oz)   01/25/24 73.4 kg (161 lb 12.8 oz)   11/30/23 77.4 kg (170 lb 10.2 oz)   11/09/23 75.7 kg (166 lb 14.2 oz)   11/07/23 74.9 kg (165 lb 2 oz)   03/02/23 76.6 kg (168 lb 14 oz)   --It appears UBW does vary (as reported by pt) is typically between ~74-77kg x ~11 mos. Wt gains since admit likely d/t fluids.    Weights (since admit):        2/5/2024  1637 " 2/6/2024  1000 2/7/2024  0900 2/8/2024  0824    Weight: 73.9 kg (162 lb 13 oz) 72.8 kg (160 lb 7.9 oz) 75.2 kg (165 lb 12.6 oz) 76.6 kg (168 lb 14 oz)        Nutrition Focused Physical Exam Findings:  Subcutaneous Fat Loss:   Orbital Fat Pads: Mild-Moderate (slight dark circles and slight hollowing)  Buccal Fat Pads: Mild-Moderate (flat cheeks, minimal bounce)  Triceps: Mild-moderate (less than ample fat tissue)  Muscle Wasting:  Temporalis: Well nourished (well-defined muscle)  Pectoralis (Clavicular Region): Well nourished (clavicle not visible)  Deltoid/Trapezius: Well nourished (rounded appearance at arm, shoulder, neck)  Interosseous: Well nourished (muscle bulges)  Trapezius/Infraspinatus/Supraspinatus (Scapular Region): Well nourished (bones not prominent, muscle taut)  Edema:  Edema Location: visually noted with mild edema to BL ankles  Physical Findings:  Hair: Negative  Eyes: Negative  Mouth: Negative  Nails: Negative  Skin: Negative    Nutrition Significant Labs:  CBC Trend:   Results from last 7 days   Lab Units 02/08/24 0125 02/07/24 0246 02/06/24 0238 02/05/24  1851   WBC AUTO x10*3/uL 9.4 9.9 11.6* 18.5*   RBC AUTO x10*6/uL 2.71* 3.02* 2.97* 3.42*   HEMOGLOBIN g/dL 9.4* 10.4* 10.3* 11.9*   HEMATOCRIT % 29.3* 32.5* 31.8* 36.4*   MCV fL 108* 108* 107* 106*   PLATELETS AUTO x10*3/uL 130* 151 144* 170   BMP Trend:   Results from last 7 days   Lab Units 02/08/24  0125 02/07/24  0246 02/06/24 0238 02/05/24  1851   GLUCOSE mg/dL 96 143* 110* 81   CALCIUM mg/dL 8.6 9.2 9.5 10.2   SODIUM mmol/L 143 141 142 140   POTASSIUM mmol/L 3.8 4.3 3.9 3.9   CO2 mmol/L 25 23 27 24   CHLORIDE mmol/L 112* 110* 109* 105   BUN mg/dL 26* 26* 23 22   CREATININE mg/dL 0.81 0.89 1.01 1.02   Liver Function Trend:   Results from last 7 days   Lab Units 02/08/24  0125 02/07/24  0246 02/06/24  0238 02/05/24  1851   ALK PHOS U/L 77 107 110 147*   AST U/L 17 12 17 20   ALT U/L 11 13 16 18   BILIRUBIN TOTAL mg/dL 0.6 0.6 0.5 0.9    Renal Lab Trend:   Results from last 7 days   Lab Units 02/08/24  0125 02/07/24  0246 02/06/24  0238 02/05/24  1851 02/05/24  1851   POTASSIUM mmol/L 3.8 4.3 3.9  --  3.9   PHOSPHORUS mg/dL 2.9 2.5 3.5   < >  --    SODIUM mmol/L 143 141 142  --  140   MAGNESIUM mg/dL 2.24 2.27 2.35  --  2.36   EGFR mL/min/1.73m*2 >90 88 77  --  76   BUN mg/dL 26* 26* 23  --  22   CREATININE mg/dL 0.81 0.89 1.01  --  1.02    < > = values in this interval not displayed.      Medications:  Scheduled medications  acyclovir, 400 mg, oral, q12h  allopurinol, 300 mg, oral, BID  DULoxetine, 30 mg, oral, Nightly  [START ON 2/12/2024] filgrastim or biosimilar, 480 mcg, subcutaneous, q24h  fluconazole, 400 mg, oral, Daily  gabapentin, 300 mg, oral, BID  gabapentin, 600 mg, oral, Nightly  levoFLOXacin, 500 mg, oral, Daily  melatonin, 10 mg, oral, Nightly  pantoprazole, 40 mg, oral, Daily before breakfast    Continuous medications     PRN medications  PRN medications: alteplase, capsaicin, prochlorperazine, prochlorperazine    I/O:   Last BM Date: 02/08/24;      Dietary Orders (From admission, onward)       Start     Ordered    02/08/24 1106  Adult diet Low pathogen  Diet effective now        Comments: pt may order from Extended Stay Menu + [email or web address] Snack List, if requested   Question:  Diet type  Answer:  Low pathogen    02/08/24 1105    02/05/24 1654  May Participate in Room Service  Once        Question:  .  Answer:  Yes    02/05/24 1654                Estimated Needs:   Total Energy Estimated Needs (kCal): 2750-5186  Method for Estimating Needs: MSJ (KYV=1194) x ~1.3-1.4  Total Protein Estimated Needs (g): 80+  Method for Estimating Needs: 62 (IBW) x ~1.3g/kg+  Total Fluid Estimated Needs (mL): per MD/team        Nutrition Diagnosis   Malnutrition Diagnosis  Patient has Malnutrition Diagnosis: No    Nutrition Diagnosis  Patient has Nutrition Diagnosis: Yes  Diagnosis Status (1): New  Nutrition Diagnosis 1: Increased nutrient needs  Related  to (1): increased metabolic demand  As Evidenced by (1): pt with Myeloma, admitted for transplant    Additional Assessment Information: Do not feel malnutrition present at this time--areas of mild adipose losses, likely age-related.       Nutrition Interventions/Recommendations     1. Continue Low Pathogen Diet, only as tolerated  --RDN provided Extended Stay Menu + [employer] Snack List to allow pt more options when ordering        Nutrition Prescription: 1800kcals/day, 80+g pro/day        Nutrition Interventions:   Food and/or Nutrient Delivery Interventions  Interventions: Meals and snacks  Meals and Snacks: General healthful diet  Goal: consume 75% or greater of 3 meals/day    Nutrition Education:     Education Documentation  Low Pathogen Diet/Food Safety Guidelines, taught by Hannah Soto RDN, LD at 2/8/2024 12:48 PM.  Learner: Family  Readiness: Acceptance  Method: Explanation, Handout  Response: Verbalizes Understanding  Comment: Verbally discussed Low Pathogen Diet + Food Safety Guidelines. Provided handout [id number]. Pt with questions on various meats and foods brought from home. Answered his questions. Denied any additional questions at this time.           Nutrition Monitoring and Evaluation   Food/Nutrient Related History Monitoring  Monitoring and Evaluation Plan: Energy intake  Energy Intake: Estimated energy intake  Criteria: meet 75% or greater of estimated needs via PO intakes    Body Composition/Growth/Weight History  Monitoring and Evaluation Plan: Weight  Weight: Measured weight  Criteria: daily wts; maintain stable wt    Biochemical Data, Medical Tests and Procedures  Monitoring and Evaluation Plan: Electrolyte/renal panel  Electrolyte and Renal Panel: Magnesium, Phosphorus, Potassium, Sodium  Criteria: lytes WNL    Time Spent/Follow-up Reminder:   Time Spent (min): 45 minutes  Last Date of Nutrition Visit: 02/08/24  Nutrition Follow-Up Needed?: Dietitian to reassess per policy

## 2024-02-08 NOTE — CARE PLAN
The patient's goals for the shift include      The clinical goals for the shift include pt will remain free from falls/injuries throughout shift      Problem: Fall/Injury  Goal: Not fall by end of shift  Outcome: Progressing  Goal: Be free from injury by end of the shift  Outcome: Progressing  Goal: Verbalize understanding of personal risk factors for fall in the hospital  Outcome: Progressing  Goal: Verbalize understanding of risk factor reduction measures to prevent injury from fall in the home  Outcome: Progressing  Goal: Use assistive devices by end of the shift  Outcome: Progressing  Goal: Pace activities to prevent fatigue by end of the shift  Outcome: Progressing

## 2024-02-08 NOTE — PROGRESS NOTES
"David Ferreira is a 78 y.o. male on day 3 of admission presenting with IgG multiple myeloma (CMS/HCC).    Subjective   No acute events over night. Afebrile, VSS. Mild nausea this morning. Having BM's. Eating and drinking okay. Ambulating the hallways. Denies dizziness, SOB, or abdominal pain. ROS otherwise unremarkable.      Objective     Physical Exam  Constitutional:       Appearance: Normal appearance.   HENT:      Head: Normocephalic and atraumatic.      Nose: Nose normal.      Mouth/Throat:      Mouth: Mucous membranes are moist.      Pharynx: Oropharynx is clear.   Eyes:      Extraocular Movements: Extraocular movements intact.      Pupils: Pupils are equal, round, and reactive to light.   Cardiovascular:      Rate and Rhythm: Normal rate and regular rhythm.      Pulses: Normal pulses.      Heart sounds: Normal heart sounds.   Pulmonary:      Effort: Pulmonary effort is normal. No respiratory distress.      Breath sounds: Normal breath sounds. No wheezing.   Abdominal:      General: Bowel sounds are normal. There is no distension.      Palpations: Abdomen is soft.      Tenderness: There is no abdominal tenderness.   Musculoskeletal:         General: No swelling. Normal range of motion.      Cervical back: Normal range of motion.   Skin:     General: Skin is warm and dry.      Capillary Refill: Capillary refill takes less than 2 seconds.   Neurological:      General: No focal deficit present.      Mental Status: He is alert and oriented to person, place, and time.         Last Recorded Vitals  Blood pressure 135/79, pulse 75, temperature 36.4 °C (97.5 °F), temperature source Temporal, resp. rate 18, height 1.651 m (5' 5\"), weight 76.6 kg (168 lb 14 oz), SpO2 98 %.  Intake/Output last 3 Shifts:  I/O last 3 completed shifts:  In: 1066.7 (14.2 mL/kg) [I.V.:1066.7 (14.2 mL/kg)]  Out: - (0 mL/kg)   Weight: 75.2 kg     Relevant Results  Scheduled medications  acyclovir, 400 mg, oral, q12h  allopurinol, 300 mg, " oral, BID  DULoxetine, 30 mg, oral, Nightly  [START ON 2/12/2024] filgrastim or biosimilar, 480 mcg, subcutaneous, q24h  fluconazole, 400 mg, oral, Daily  gabapentin, 300 mg, oral, BID  gabapentin, 600 mg, oral, Nightly  levoFLOXacin, 500 mg, oral, Daily  melatonin, 10 mg, oral, Nightly  pantoprazole, 40 mg, oral, Daily before breakfast      Continuous medications       PRN medications  PRN medications: alteplase, capsaicin, prochlorperazine, prochlorperazine    Results for orders placed or performed during the hospital encounter of 02/05/24 (from the past 24 hour(s))   Uric Acid   Result Value Ref Range    Uric Acid 4.7 4.0 - 7.5 mg/dL   Magnesium   Result Value Ref Range    Magnesium 2.24 1.60 - 2.40 mg/dL   CBC and Auto Differential   Result Value Ref Range    WBC 9.4 4.4 - 11.3 x10*3/uL    nRBC 0.0 0.0 - 0.0 /100 WBCs    RBC 2.71 (L) 4.50 - 5.90 x10*6/uL    Hemoglobin 9.4 (L) 13.5 - 17.5 g/dL    Hematocrit 29.3 (L) 41.0 - 52.0 %     (H) 80 - 100 fL    MCH 34.7 (H) 26.0 - 34.0 pg    MCHC 32.1 32.0 - 36.0 g/dL    RDW 15.2 (H) 11.5 - 14.5 %    Platelets 130 (L) 150 - 450 x10*3/uL    Neutrophils % 94.3 40.0 - 80.0 %    Immature Granulocytes %, Automated 1.3 (H) 0.0 - 0.9 %    Lymphocytes % 2.0 13.0 - 44.0 %    Monocytes % 2.3 2.0 - 10.0 %    Eosinophils % 0.0 0.0 - 6.0 %    Basophils % 0.1 0.0 - 2.0 %    Neutrophils Absolute 8.84 (H) 1.60 - 5.50 x10*3/uL    Immature Granulocytes Absolute, Automated 0.12 0.00 - 0.50 x10*3/uL    Lymphocytes Absolute 0.19 (L) 0.80 - 3.00 x10*3/uL    Monocytes Absolute 0.22 0.05 - 0.80 x10*3/uL    Eosinophils Absolute 0.00 0.00 - 0.40 x10*3/uL    Basophils Absolute 0.01 0.00 - 0.10 x10*3/uL   Hepatic Function Panel   Result Value Ref Range    Albumin 3.5 3.4 - 5.0 g/dL    Bilirubin, Total 0.6 0.0 - 1.2 mg/dL    Bilirubin, Direct 0.1 0.0 - 0.3 mg/dL    Alkaline Phosphatase 77 33 - 136 U/L    ALT 11 10 - 52 U/L    AST 17 9 - 39 U/L    Total Protein 5.6 (L) 6.4 - 8.2 g/dL    Phosphorus   Result Value Ref Range    Phosphorus 2.9 2.5 - 4.9 mg/dL   Basic Metabolic Panel   Result Value Ref Range    Glucose 96 74 - 99 mg/dL    Sodium 143 136 - 145 mmol/L    Potassium 3.8 3.5 - 5.3 mmol/L    Chloride 112 (H) 98 - 107 mmol/L    Bicarbonate 25 21 - 32 mmol/L    Anion Gap 10 10 - 20 mmol/L    Urea Nitrogen 26 (H) 6 - 23 mg/dL    Creatinine 0.81 0.50 - 1.30 mg/dL    eGFR >90 >60 mL/min/1.73m*2    Calcium 8.6 8.6 - 10.6 mg/dL       IR CVC nontunneled    Result Date: 1/29/2024  Interpreted By:  Mono Reyna and Ogievich Taessa STUDY: IR CVC NONTUNNELED;  1/29/2024 10:01 am   INDICATION: Signs/Symptoms:PBSCT Auto.   COMPARISON: None.   ACCESSION NUMBER(S): [id number]   ORDERING CLINICIAN: GILDA MARTINEZ   TECHNIQUE: INTERVENTIONALIST(S): Mono Reyna MD   CONSENT: The patient/patient's POA/next of kin was informed of the nature of the proposed procedure. The purposes, alternatives, risks, and benefits were explained and discussed. All questions were answered and consent was obtained.   RADIATION EXPOSURE: Fluoroscopy time: 0.4 min Dose: 4.21 mGy Dose Area Product (DAP): 1632 mGy*cm^2   SEDATION: Moderate conscious IV sedation services (supervision of administration, induction, and maintenance) were provided by the physician performing the procedure with intravenous fentanyl 50mcg and versed 1 mg. The physician was assisted by an independent trained observer, an interventional radiology nurse, in the continuous monitoring of patient level of consciousness and physiologic status.   MEDICATION: None.   TIME OUT: A time out was performed immediately prior to procedure start with the interventional team, correctly identifying the patient name, date of birth, MRN, procedure, anatomy (including marking of site and side), patient position, procedure consent form, relevant laboratory and imaging test results, safety precautions, and procedure-specific equipment needs.   COMPLICATIONS: No  immediate adverse events identified.   FINDINGS: The patient was positioned supine on the angiography table. The right supraclavicular cutaneous tissues were prepared and draped in sterile manner.  1% lidocaine local anesthesia was instilled into the subcutaneous soft tissues at the selected access site for local anesthesia. Ultrasound images demonstrate a patent and compressible right  internal jugular vein. Utilizing direct ultrasound guidance and micropuncture/Seldinger technique, the  right  internal jugular vein was accessed. An ultrasound digital spot image was acquired and stored on the  PACS. After confirmation of location, a 018 [place]-Mandrel guidewire was introduced and advanced into the inferior vena cava utilizing intermittent fluoroscopy.   The needle was removed with the guidewire left in place and exchanged for a 5-Chinese coaxial dilator.  The inner dilator and wire were removed and a J-tipped 035 guidewire was introduced through the access sheath.  The skin tract was dilated with successive increase in size of vascular dilators under direct fluoroscopic visualization.   Subsequently, a temporary 13 Chinese catheter was advanced with its tip overlying the right atrium under direct fluoroscopic guidance. The catheter ports were aspirated and flushed with normal saline and charged with heparin. The external portions of the catheter were secured in place with sterile suture dressings.   The patient tolerated the procedure without complication.       1. Technically successful and uncomplicated placement of a right internal jugular temporary dialysis catheter under direct ultrasound and fluoroscopic guidance - optimal catheter tip position at the right atrium and the catheter is ready for immediate use.   I personally reviewed the images/study and I agree with the findings as stated by Fabiola Amezcua DO, PGY-2. This study was interpreted at [facility], [place], [place].    Performed and dictated at [facility].   MACRO: None.   Signed by: Mono Reyna 1/29/2024 2:44 PM Dictation workstation:   [id number]    XR chest 2 views    Result Date: 1/26/2024  Interpreted By:  Candido Ayala and Ritchie Brandon STUDY: XR CHEST 2 VIEWS;  1/25/2024 10:35 am   INDICATION: Signs/Symptoms:PBSCT Auto.   COMPARISON: None.   ACCESSION NUMBER(S): [id number]   ORDERING CLINICIAN: GILDA MARTINEZ   FINDINGS: PA and lateral radiographs of the chest were provided.   CARDIOMEDIASTINAL SILHOUETTE: Cardiomediastinal silhouette is normal in size and configuration. There is mild calcification of the aortic arch   LUNGS: Lungs are clear.   ABDOMEN: No remarkable upper abdominal findings.   BONES: No acute osseous changes.       1.  No evidence of acute cardiopulmonary process.   I personally reviewed the images/study and I agree with the findings as stated by resident Michoacano Rivera. This study was interpreted at [place], [place].   MACRO: None   Signed by: Candido Ayala 1/26/2024 10:50 AM Dictation workstation:   [id number]          This patient has a central line   Reason for the central line remaining today? Parenteral medication        Assessment/Plan   Principal Problem:    IgG multiple myeloma (CMS/HCC)  Active Problems:    Kappa light chain myeloma (CMS/HCC)    David Ferreira is a 78 y.o. 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 FISH neg.   -CT/PET neg  -B2M 3.8, IgG 6072, Kappa  mg/L, K/L ratio 589, normal Ca, Cr, LDH, alb  -3/2023 Started Velcade, Revlimid, and Dex  -Switched to Pratibha, Kyprolis, and Dex 5/2023 with slow response to therapy  - Now in [place]  - Collected stem cells in 1 day on  1/29/24  - Now admitted for his Autologous SCT (2/07/24)    CHEMO:  -Melphalan 140 mg/m2 T-1     HEME:  - Asymptomatic anemia and thrombocytopenia  - Monitor CBC and transfuse as needed      ID:  - Afebrile  - Plan ACV and Fluconazole prophy per protocol  - Plan Zosyn with fever     FEN/GI:  - Admit wt: 73.9 kg current wt: 76.6 kg   - Con PPI, Allopurinol   - Monitor electrolytes and replete as needed  - Discontinued IVF's on 2/08     CV:  ECHO (11/7/23) with EF of 65%     MSK:  - Rt Rotator cuff tear  - Cont Capsaicin as needed     NEURO:  - 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 Cont Gabapentin. - - Continue Cymbalta 30mg at bedtime per Dr. Contreras's recs.     MSC:  - Cont bedtime Melatonin for sleep     Patient seen, examined and discussed with MD Leigh Castro PA-C

## 2024-02-09 LAB
ALBUMIN SERPL BCP-MCNC: 3.2 G/DL (ref 3.4–5)
ALP SERPL-CCNC: 78 U/L (ref 33–136)
ALT SERPL W P-5'-P-CCNC: 10 U/L (ref 10–52)
ANION GAP SERPL CALC-SCNC: 12 MMOL/L (ref 10–20)
AST SERPL W P-5'-P-CCNC: 12 U/L (ref 9–39)
BASOPHILS # BLD AUTO: 0 X10*3/UL (ref 0–0.1)
BASOPHILS NFR BLD AUTO: 0 %
BILIRUB DIRECT SERPL-MCNC: 0.1 MG/DL (ref 0–0.3)
BILIRUB SERPL-MCNC: 0.6 MG/DL (ref 0–1.2)
BUN SERPL-MCNC: 25 MG/DL (ref 6–23)
CALCIUM SERPL-MCNC: 9 MG/DL (ref 8.6–10.6)
CHLORIDE SERPL-SCNC: 110 MMOL/L (ref 98–107)
CO2 SERPL-SCNC: 22 MMOL/L (ref 21–32)
CREAT SERPL-MCNC: 0.8 MG/DL (ref 0.5–1.3)
EGFRCR SERPLBLD CKD-EPI 2021: >90 ML/MIN/1.73M*2
EOSINOPHIL # BLD AUTO: 0 X10*3/UL (ref 0–0.4)
EOSINOPHIL NFR BLD AUTO: 0 %
ERYTHROCYTE [DISTWIDTH] IN BLOOD BY AUTOMATED COUNT: 14.8 % (ref 11.5–14.5)
GLUCOSE SERPL-MCNC: 97 MG/DL (ref 74–99)
HCT VFR BLD AUTO: 28.1 % (ref 41–52)
HGB BLD-MCNC: 9 G/DL (ref 13.5–17.5)
IMM GRANULOCYTES # BLD AUTO: 0.04 X10*3/UL (ref 0–0.5)
IMM GRANULOCYTES NFR BLD AUTO: 0.8 % (ref 0–0.9)
LYMPHOCYTES # BLD AUTO: 0.09 X10*3/UL (ref 0.8–3)
LYMPHOCYTES NFR BLD AUTO: 1.7 %
MAGNESIUM SERPL-MCNC: 2.22 MG/DL (ref 1.6–2.4)
MCH RBC QN AUTO: 34.6 PG (ref 26–34)
MCHC RBC AUTO-ENTMCNC: 32 G/DL (ref 32–36)
MCV RBC AUTO: 108 FL (ref 80–100)
MONOCYTES # BLD AUTO: 0.02 X10*3/UL (ref 0.05–0.8)
MONOCYTES NFR BLD AUTO: 0.4 %
NEUTROPHILS # BLD AUTO: 5.17 X10*3/UL (ref 1.6–5.5)
NEUTROPHILS NFR BLD AUTO: 97.1 %
NRBC BLD-RTO: 0 /100 WBCS (ref 0–0)
PHOSPHATE SERPL-MCNC: 2.8 MG/DL (ref 2.5–4.9)
PLATELET # BLD AUTO: 112 X10*3/UL (ref 150–450)
POTASSIUM SERPL-SCNC: 4 MMOL/L (ref 3.5–5.3)
PROT SERPL-MCNC: 5.4 G/DL (ref 6.4–8.2)
RBC # BLD AUTO: 2.6 X10*6/UL (ref 4.5–5.9)
SODIUM SERPL-SCNC: 140 MMOL/L (ref 136–145)
URATE SERPL-MCNC: 4 MG/DL (ref 4–7.5)
WBC # BLD AUTO: 5.3 X10*3/UL (ref 4.4–11.3)

## 2024-02-09 PROCEDURE — 84100 ASSAY OF PHOSPHORUS: CPT | Performed by: PHYSICIAN ASSISTANT

## 2024-02-09 PROCEDURE — 2500000004 HC RX 250 GENERAL PHARMACY W/ HCPCS (ALT 636 FOR OP/ED): Performed by: PHYSICIAN ASSISTANT

## 2024-02-09 PROCEDURE — 2500000001 HC RX 250 WO HCPCS SELF ADMINISTERED DRUGS (ALT 637 FOR MEDICARE OP): Performed by: NURSE PRACTITIONER

## 2024-02-09 PROCEDURE — 83735 ASSAY OF MAGNESIUM: CPT | Performed by: PHYSICIAN ASSISTANT

## 2024-02-09 PROCEDURE — 82248 BILIRUBIN DIRECT: CPT | Performed by: PHYSICIAN ASSISTANT

## 2024-02-09 PROCEDURE — 80053 COMPREHEN METABOLIC PANEL: CPT | Performed by: PHYSICIAN ASSISTANT

## 2024-02-09 PROCEDURE — 85025 COMPLETE CBC W/AUTO DIFF WBC: CPT | Performed by: PHYSICIAN ASSISTANT

## 2024-02-09 PROCEDURE — 99233 SBSQ HOSP IP/OBS HIGH 50: CPT | Performed by: INTERNAL MEDICINE

## 2024-02-09 PROCEDURE — 2500000001 HC RX 250 WO HCPCS SELF ADMINISTERED DRUGS (ALT 637 FOR MEDICARE OP): Performed by: PHYSICIAN ASSISTANT

## 2024-02-09 PROCEDURE — 84550 ASSAY OF BLOOD/URIC ACID: CPT | Performed by: PHYSICIAN ASSISTANT

## 2024-02-09 PROCEDURE — 1170000001 HC PRIVATE ONCOLOGY ROOM DAILY

## 2024-02-09 PROCEDURE — 2500000001 HC RX 250 WO HCPCS SELF ADMINISTERED DRUGS (ALT 637 FOR MEDICARE OP): Performed by: INTERNAL MEDICINE

## 2024-02-09 RX ORDER — OXYCODONE HYDROCHLORIDE 5 MG/1
5 TABLET ORAL EVERY 6 HOURS PRN
Status: DISCONTINUED | OUTPATIENT
Start: 2024-02-09 | End: 2024-02-22 | Stop reason: HOSPADM

## 2024-02-09 RX ORDER — ACETAMINOPHEN 325 MG/1
650 TABLET ORAL ONCE
Status: COMPLETED | OUTPATIENT
Start: 2024-02-09 | End: 2024-02-09

## 2024-02-09 RX ADMIN — DULOXETINE HYDROCHLORIDE 30 MG: 30 CAPSULE, DELAYED RELEASE ORAL at 20:31

## 2024-02-09 RX ADMIN — Medication 10 MG: at 20:31

## 2024-02-09 RX ADMIN — ACYCLOVIR 400 MG: 400 TABLET ORAL at 20:31

## 2024-02-09 RX ADMIN — GABAPENTIN 600 MG: 300 CAPSULE ORAL at 20:31

## 2024-02-09 RX ADMIN — PANTOPRAZOLE SODIUM 40 MG: 40 TABLET, DELAYED RELEASE ORAL at 07:02

## 2024-02-09 RX ADMIN — ALLOPURINOL 300 MG: 300 TABLET ORAL at 20:31

## 2024-02-09 RX ADMIN — GABAPENTIN 300 MG: 300 CAPSULE ORAL at 08:40

## 2024-02-09 RX ADMIN — GABAPENTIN 300 MG: 300 CAPSULE ORAL at 14:07

## 2024-02-09 RX ADMIN — ACYCLOVIR 400 MG: 400 TABLET ORAL at 08:40

## 2024-02-09 RX ADMIN — ALLOPURINOL 300 MG: 300 TABLET ORAL at 08:40

## 2024-02-09 RX ADMIN — ACETAMINOPHEN 650 MG: 325 TABLET ORAL at 16:23

## 2024-02-09 RX ADMIN — LEVOFLOXACIN 500 MG: 500 TABLET, FILM COATED ORAL at 08:40

## 2024-02-09 RX ADMIN — FLUCONAZOLE 400 MG: 200 TABLET ORAL at 08:40

## 2024-02-09 ASSESSMENT — COGNITIVE AND FUNCTIONAL STATUS - GENERAL
DAILY ACTIVITIY SCORE: 24
MOBILITY SCORE: 23
CLIMB 3 TO 5 STEPS WITH RAILING: A LITTLE
DAILY ACTIVITIY SCORE: 24
MOBILITY SCORE: 23
CLIMB 3 TO 5 STEPS WITH RAILING: A LITTLE

## 2024-02-09 ASSESSMENT — PAIN - FUNCTIONAL ASSESSMENT
PAIN_FUNCTIONAL_ASSESSMENT: 0-10
PAIN_FUNCTIONAL_ASSESSMENT: 0-10

## 2024-02-09 ASSESSMENT — PAIN SCALES - GENERAL
PAINLEVEL_OUTOF10: 0 - NO PAIN
PAINLEVEL_OUTOF10: 2
PAINLEVEL_OUTOF10: 0 - NO PAIN

## 2024-02-09 ASSESSMENT — PAIN DESCRIPTION - LOCATION: LOCATION: SHOULDER

## 2024-02-09 ASSESSMENT — PAIN DESCRIPTION - ORIENTATION: ORIENTATION: RIGHT

## 2024-02-09 NOTE — CARE PLAN
The patient's goals for the shift include    Problem: Fall/Injury  Goal: Not fall by end of shift  Outcome: Progressing  Goal: Be free from injury by end of the shift  Outcome: Progressing  Goal: Verbalize understanding of personal risk factors for fall in the hospital  Outcome: Progressing  Goal: Verbalize understanding of risk factor reduction measures to prevent injury from fall in the home  Outcome: Progressing  Goal: Use assistive devices by end of the shift  Outcome: Progressing  Goal: Pace activities to prevent fatigue by end of the shift  Outcome: Progressing       The clinical goals for the shift include pt will remain free from injuries

## 2024-02-09 NOTE — PROGRESS NOTES
"David Ferreira is a 78 y.o. male on day 4 of admission presenting with IgG multiple myeloma (CMS/HCC).    Subjective    Afebrile, VSS. He reports having nausea with the thought of food. He is still taking his oral pills without difficulty. C/o poor sleep overnight. Also had Rt shoulder pain overnight. Having BM's. Breathing okay. Denies dizziness or bleeding issues. ROS otherwise unremarkable.      Objective     Physical Exam  Constitutional:       Appearance: Normal appearance.   HENT:      Head: Normocephalic and atraumatic.      Nose: Nose normal.      Mouth/Throat:      Mouth: Mucous membranes are moist.      Pharynx: Oropharynx is clear.   Eyes:      Extraocular Movements: Extraocular movements intact.      Pupils: Pupils are equal, round, and reactive to light.   Cardiovascular:      Rate and Rhythm: Normal rate and regular rhythm.      Pulses: Normal pulses.      Heart sounds: Normal heart sounds.   Pulmonary:      Effort: Pulmonary effort is normal. No respiratory distress.      Breath sounds: Normal breath sounds. No wheezing.   Abdominal:      General: Bowel sounds are normal. There is no distension.      Palpations: Abdomen is soft.      Tenderness: There is no abdominal tenderness.   Musculoskeletal:         General: No swelling. Normal range of motion.      Cervical back: Normal range of motion.   Skin:     General: Skin is warm and dry.      Capillary Refill: Capillary refill takes less than 2 seconds.   Neurological:      General: No focal deficit present.      Mental Status: He is alert and oriented to person, place, and time.         Last Recorded Vitals  Blood pressure 133/79, pulse 85, temperature 36.1 °C (97 °F), temperature source Temporal, resp. rate 18, height 1.651 m (5' 5\"), weight 75.6 kg (166 lb 10.7 oz), SpO2 97 %.  Intake/Output last 3 Shifts:  No intake/output data recorded.    Relevant Results  Scheduled medications  acyclovir, 400 mg, oral, q12h  allopurinol, 300 mg, oral, " BID  DULoxetine, 30 mg, oral, Nightly  [START ON 2/12/2024] filgrastim or biosimilar, 480 mcg, subcutaneous, q24h  fluconazole, 400 mg, oral, Daily  gabapentin, 300 mg, oral, BID  gabapentin, 600 mg, oral, Nightly  levoFLOXacin, 500 mg, oral, Daily  melatonin, 10 mg, oral, Nightly  pantoprazole, 40 mg, oral, Daily before breakfast      Continuous medications       PRN medications  PRN medications: alteplase, capsaicin, oxyCODONE, prochlorperazine, prochlorperazine    Results for orders placed or performed during the hospital encounter of 02/05/24 (from the past 24 hour(s))   Uric Acid   Result Value Ref Range    Uric Acid 4.0 4.0 - 7.5 mg/dL   Magnesium   Result Value Ref Range    Magnesium 2.22 1.60 - 2.40 mg/dL   CBC and Auto Differential   Result Value Ref Range    WBC 5.3 4.4 - 11.3 x10*3/uL    nRBC 0.0 0.0 - 0.0 /100 WBCs    RBC 2.60 (L) 4.50 - 5.90 x10*6/uL    Hemoglobin 9.0 (L) 13.5 - 17.5 g/dL    Hematocrit 28.1 (L) 41.0 - 52.0 %     (H) 80 - 100 fL    MCH 34.6 (H) 26.0 - 34.0 pg    MCHC 32.0 32.0 - 36.0 g/dL    RDW 14.8 (H) 11.5 - 14.5 %    Platelets 112 (L) 150 - 450 x10*3/uL    Neutrophils % 97.1 40.0 - 80.0 %    Immature Granulocytes %, Automated 0.8 0.0 - 0.9 %    Lymphocytes % 1.7 13.0 - 44.0 %    Monocytes % 0.4 2.0 - 10.0 %    Eosinophils % 0.0 0.0 - 6.0 %    Basophils % 0.0 0.0 - 2.0 %    Neutrophils Absolute 5.17 1.60 - 5.50 x10*3/uL    Immature Granulocytes Absolute, Automated 0.04 0.00 - 0.50 x10*3/uL    Lymphocytes Absolute 0.09 (L) 0.80 - 3.00 x10*3/uL    Monocytes Absolute 0.02 (L) 0.05 - 0.80 x10*3/uL    Eosinophils Absolute 0.00 0.00 - 0.40 x10*3/uL    Basophils Absolute 0.00 0.00 - 0.10 x10*3/uL   Hepatic Function Panel   Result Value Ref Range    Albumin 3.2 (L) 3.4 - 5.0 g/dL    Bilirubin, Total 0.6 0.0 - 1.2 mg/dL    Bilirubin, Direct 0.1 0.0 - 0.3 mg/dL    Alkaline Phosphatase 78 33 - 136 U/L    ALT 10 10 - 52 U/L    AST 12 9 - 39 U/L    Total Protein 5.4 (L) 6.4 - 8.2 g/dL    Phosphorus   Result Value Ref Range    Phosphorus 2.8 2.5 - 4.9 mg/dL   Basic Metabolic Panel   Result Value Ref Range    Glucose 97 74 - 99 mg/dL    Sodium 140 136 - 145 mmol/L    Potassium 4.0 3.5 - 5.3 mmol/L    Chloride 110 (H) 98 - 107 mmol/L    Bicarbonate 22 21 - 32 mmol/L    Anion Gap 12 10 - 20 mmol/L    Urea Nitrogen 25 (H) 6 - 23 mg/dL    Creatinine 0.80 0.50 - 1.30 mg/dL    eGFR >90 >60 mL/min/1.73m*2    Calcium 9.0 8.6 - 10.6 mg/dL       IR CVC nontunneled    Result Date: 1/29/2024  Interpreted By:  Mono Reyna and Ogievich Taessa STUDY: IR CVC NONTUNNELED;  1/29/2024 10:01 am   INDICATION: Signs/Symptoms:PBSCT Auto.   COMPARISON: None.   ACCESSION NUMBER(S): [id number]   ORDERING CLINICIAN: GILDA MARTINEZ   TECHNIQUE: INTERVENTIONALIST(S): Mono Reyna MD   CONSENT: The patient/patient's POA/next of kin was informed of the nature of the proposed procedure. The purposes, alternatives, risks, and benefits were explained and discussed. All questions were answered and consent was obtained.   RADIATION EXPOSURE: Fluoroscopy time: 0.4 min Dose: 4.21 mGy Dose Area Product (DAP): 1632 mGy*cm^2   SEDATION: Moderate conscious IV sedation services (supervision of administration, induction, and maintenance) were provided by the physician performing the procedure with intravenous fentanyl 50mcg and versed 1 mg. The physician was assisted by an independent trained observer, an interventional radiology nurse, in the continuous monitoring of patient level of consciousness and physiologic status.   MEDICATION: None.   TIME OUT: A time out was performed immediately prior to procedure start with the interventional team, correctly identifying the patient name, date of birth, MRN, procedure, anatomy (including marking of site and side), patient position, procedure consent form, relevant laboratory and imaging test results, safety precautions, and procedure-specific equipment needs.   COMPLICATIONS: No  immediate adverse events identified.   FINDINGS: The patient was positioned supine on the angiography table. The right supraclavicular cutaneous tissues were prepared and draped in sterile manner.  1% lidocaine local anesthesia was instilled into the subcutaneous soft tissues at the selected access site for local anesthesia. Ultrasound images demonstrate a patent and compressible right  internal jugular vein. Utilizing direct ultrasound guidance and micropuncture/Seldinger technique, the  right  internal jugular vein was accessed. An ultrasound digital spot image was acquired and stored on the  PACS. After confirmation of location, a 018 [place]-Mandrel guidewire was introduced and advanced into the inferior vena cava utilizing intermittent fluoroscopy.   The needle was removed with the guidewire left in place and exchanged for a 5-[place] coaxial dilator.  The inner dilator and wire were removed and a J-tipped 035 guidewire was introduced through the access sheath.  The skin tract was dilated with successive increase in size of vascular dilators under direct fluoroscopic visualization.   Subsequently, a temporary 13 [place] catheter was advanced with its tip overlying the right atrium under direct fluoroscopic guidance. The catheter ports were aspirated and flushed with normal saline and charged with heparin. The external portions of the catheter were secured in place with sterile suture dressings.   The patient tolerated the procedure without complication.       1. Technically successful and uncomplicated placement of a right internal jugular temporary dialysis catheter under direct ultrasound and fluoroscopic guidance - optimal catheter tip position at the right atrium and the catheter is ready for immediate use.   I personally reviewed the images/study and I agree with the findings as stated by Fabiola Amezcua DO, PGY-2. This study was interpreted at [facility], [place], [place].    Performed and dictated at [facility].   MACRO: None.   Signed by: Mono Reyna 1/29/2024 2:44 PM Dictation workstation:   [id number]    XR chest 2 views    Result Date: 1/26/2024  Interpreted By:  Candido Ayala and Ritchie Brandon STUDY: XR CHEST 2 VIEWS;  1/25/2024 10:35 am   INDICATION: Signs/Symptoms:PBSCT Auto.   COMPARISON: None.   ACCESSION NUMBER(S): [id number]   ORDERING CLINICIAN: GILDA MARTINEZ   FINDINGS: PA and lateral radiographs of the chest were provided.   CARDIOMEDIASTINAL SILHOUETTE: Cardiomediastinal silhouette is normal in size and configuration. There is mild calcification of the aortic arch   LUNGS: Lungs are clear.   ABDOMEN: No remarkable upper abdominal findings.   BONES: No acute osseous changes.       1.  No evidence of acute cardiopulmonary process.   I personally reviewed the images/study and I agree with the findings as stated by resident Michoacano Rivera. This study was interpreted at [place], [place].   MACRO: None   Signed by: Candido Ayala 1/26/2024 10:50 AM Dictation workstation:   [id number]          This patient has a central line   Reason for the central line remaining today? Parenteral medication        Assessment/Plan   Principal Problem:    IgG multiple myeloma (CMS/HCC)  Active Problems:    Kappa light chain myeloma (CMS/HCC)    David Ferreira is a 78 y.o. male with IgG [place] Myeloma in [place] admitted for his Autologous SCT with Sonja 140 (T=0, 2/07/24)     Currently T+2 today    ONC:  -Hx/o IgG Kappa Multiple Myeloma  -Initially evaluated for elevated protein  -Bmbx c/w 30-40% kappa restricted plasma cells. FISH neg.   -CT/PET neg  -B2M 3.8, IgG 6072, Kappa  mg/L, K/L ratio 589, normal Ca, Cr, LDH, alb  -3/2023 Started Velcade, Revlimid, and Dex  -Switched to Pratibha, Kyprolis, and Dex 5/2023 with slow response to therapy  - Now in [place]  - Collected stem cells in 1 day on  1/29/24  - Now admitted for his Autologous SCT (2/07/24)    CHEMO:  -Melphalan 140 mg/m2 T-1     HEME:  - Asymptomatic anemia and thrombocytopenia  - Monitor CBC and transfuse as needed      ID:  - Afebrile  - Plan ACV and Fluconazole prophy per protocol  - Plan Zosyn with fever     FEN/GI:  - Admit wt: 73.9 kg current wt: 75.6 kg   - Con PPI, Allopurinol   - Monitor electrolytes and replete as needed  - Discontinued IVF's on 2/08     CV:  ECHO (11/7/23) with EF of 65%     MSK:  - Rt Rotator cuff tear  - Cont Capsaicin as needed     NEURO:  - 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 Cont Gabapentin. - - Continue Cymbalta 30mg at bedtime per Dr. Contreras's recs.     MSC:  - Cont bedtime Melatonin for sleep     Patient seen, examined, and discussed with MD Leigh Castro PA-C

## 2024-02-10 LAB
ALBUMIN SERPL BCP-MCNC: 3.2 G/DL (ref 3.4–5)
ALP SERPL-CCNC: 73 U/L (ref 33–136)
ALT SERPL W P-5'-P-CCNC: 11 U/L (ref 10–52)
ANION GAP SERPL CALC-SCNC: 11 MMOL/L (ref 10–20)
AST SERPL W P-5'-P-CCNC: 12 U/L (ref 9–39)
BASOPHILS # BLD AUTO: 0 X10*3/UL (ref 0–0.1)
BASOPHILS NFR BLD AUTO: 0 %
BILIRUB DIRECT SERPL-MCNC: 0.1 MG/DL (ref 0–0.3)
BILIRUB SERPL-MCNC: 0.6 MG/DL (ref 0–1.2)
BUN SERPL-MCNC: 21 MG/DL (ref 6–23)
CALCIUM SERPL-MCNC: 8.9 MG/DL (ref 8.6–10.6)
CHLORIDE SERPL-SCNC: 108 MMOL/L (ref 98–107)
CO2 SERPL-SCNC: 24 MMOL/L (ref 21–32)
CREAT SERPL-MCNC: 0.72 MG/DL (ref 0.5–1.3)
EGFRCR SERPLBLD CKD-EPI 2021: >90 ML/MIN/1.73M*2
EOSINOPHIL # BLD AUTO: 0 X10*3/UL (ref 0–0.4)
EOSINOPHIL NFR BLD AUTO: 0 %
ERYTHROCYTE [DISTWIDTH] IN BLOOD BY AUTOMATED COUNT: 14.5 % (ref 11.5–14.5)
GLUCOSE SERPL-MCNC: 104 MG/DL (ref 74–99)
HCT VFR BLD AUTO: 28 % (ref 41–52)
HGB BLD-MCNC: 9 G/DL (ref 13.5–17.5)
IMM GRANULOCYTES # BLD AUTO: 0.02 X10*3/UL (ref 0–0.5)
IMM GRANULOCYTES NFR BLD AUTO: 0.5 % (ref 0–0.9)
LYMPHOCYTES # BLD AUTO: 0.05 X10*3/UL (ref 0.8–3)
LYMPHOCYTES NFR BLD AUTO: 1.3 %
MAGNESIUM SERPL-MCNC: 2.08 MG/DL (ref 1.6–2.4)
MCH RBC QN AUTO: 34.1 PG (ref 26–34)
MCHC RBC AUTO-ENTMCNC: 32.1 G/DL (ref 32–36)
MCV RBC AUTO: 106 FL (ref 80–100)
MONOCYTES # BLD AUTO: 0.01 X10*3/UL (ref 0.05–0.8)
MONOCYTES NFR BLD AUTO: 0.3 %
NEUTROPHILS # BLD AUTO: 3.86 X10*3/UL (ref 1.6–5.5)
NEUTROPHILS NFR BLD AUTO: 97.9 %
NRBC BLD-RTO: 0 /100 WBCS (ref 0–0)
PHOSPHATE SERPL-MCNC: 2.7 MG/DL (ref 2.5–4.9)
PLATELET # BLD AUTO: 105 X10*3/UL (ref 150–450)
POTASSIUM SERPL-SCNC: 3.9 MMOL/L (ref 3.5–5.3)
PROT SERPL-MCNC: 5.2 G/DL (ref 6.4–8.2)
RBC # BLD AUTO: 2.64 X10*6/UL (ref 4.5–5.9)
RBC MORPH BLD: NORMAL
SODIUM SERPL-SCNC: 139 MMOL/L (ref 136–145)
URATE SERPL-MCNC: 3.8 MG/DL (ref 4–7.5)
WBC # BLD AUTO: 3.9 X10*3/UL (ref 4.4–11.3)

## 2024-02-10 PROCEDURE — 2500000001 HC RX 250 WO HCPCS SELF ADMINISTERED DRUGS (ALT 637 FOR MEDICARE OP): Performed by: PHYSICIAN ASSISTANT

## 2024-02-10 PROCEDURE — 99233 SBSQ HOSP IP/OBS HIGH 50: CPT | Performed by: INTERNAL MEDICINE

## 2024-02-10 PROCEDURE — 2500000001 HC RX 250 WO HCPCS SELF ADMINISTERED DRUGS (ALT 637 FOR MEDICARE OP): Performed by: NURSE PRACTITIONER

## 2024-02-10 PROCEDURE — 2500000001 HC RX 250 WO HCPCS SELF ADMINISTERED DRUGS (ALT 637 FOR MEDICARE OP): Performed by: INTERNAL MEDICINE

## 2024-02-10 PROCEDURE — 84100 ASSAY OF PHOSPHORUS: CPT | Performed by: PHYSICIAN ASSISTANT

## 2024-02-10 PROCEDURE — 2500000002 HC RX 250 W HCPCS SELF ADMINISTERED DRUGS (ALT 637 FOR MEDICARE OP, ALT 636 FOR OP/ED): Performed by: PHYSICIAN ASSISTANT

## 2024-02-10 PROCEDURE — 84550 ASSAY OF BLOOD/URIC ACID: CPT | Performed by: PHYSICIAN ASSISTANT

## 2024-02-10 PROCEDURE — 1170000001 HC PRIVATE ONCOLOGY ROOM DAILY

## 2024-02-10 PROCEDURE — 85025 COMPLETE CBC W/AUTO DIFF WBC: CPT | Performed by: PHYSICIAN ASSISTANT

## 2024-02-10 PROCEDURE — 83735 ASSAY OF MAGNESIUM: CPT | Performed by: PHYSICIAN ASSISTANT

## 2024-02-10 PROCEDURE — 82248 BILIRUBIN DIRECT: CPT | Performed by: PHYSICIAN ASSISTANT

## 2024-02-10 PROCEDURE — 2500000001 HC RX 250 WO HCPCS SELF ADMINISTERED DRUGS (ALT 637 FOR MEDICARE OP)

## 2024-02-10 RX ORDER — BACLOFEN 10 MG/1
10 TABLET ORAL ONCE
Status: COMPLETED | OUTPATIENT
Start: 2024-02-10 | End: 2024-02-10

## 2024-02-10 RX ADMIN — OXYCODONE HYDROCHLORIDE 5 MG: 5 TABLET ORAL at 08:30

## 2024-02-10 RX ADMIN — ACYCLOVIR 400 MG: 400 TABLET ORAL at 20:14

## 2024-02-10 RX ADMIN — ALLOPURINOL 300 MG: 300 TABLET ORAL at 08:26

## 2024-02-10 RX ADMIN — PANTOPRAZOLE SODIUM 40 MG: 40 TABLET, DELAYED RELEASE ORAL at 08:25

## 2024-02-10 RX ADMIN — FLUCONAZOLE 400 MG: 200 TABLET ORAL at 08:26

## 2024-02-10 RX ADMIN — GABAPENTIN 300 MG: 300 CAPSULE ORAL at 08:26

## 2024-02-10 RX ADMIN — GABAPENTIN 300 MG: 300 CAPSULE ORAL at 13:35

## 2024-02-10 RX ADMIN — GABAPENTIN 600 MG: 300 CAPSULE ORAL at 20:14

## 2024-02-10 RX ADMIN — BACLOFEN 10 MG: 10 TABLET ORAL at 09:14

## 2024-02-10 RX ADMIN — LEVOFLOXACIN 500 MG: 500 TABLET, FILM COATED ORAL at 08:26

## 2024-02-10 RX ADMIN — ACYCLOVIR 400 MG: 400 TABLET ORAL at 08:26

## 2024-02-10 RX ADMIN — Medication 10 MG: at 20:14

## 2024-02-10 RX ADMIN — DULOXETINE HYDROCHLORIDE 30 MG: 30 CAPSULE, DELAYED RELEASE ORAL at 20:14

## 2024-02-10 ASSESSMENT — PAIN SCALES - GENERAL
PAINLEVEL_OUTOF10: 2
PAINLEVEL_OUTOF10: 7
PAINLEVEL_OUTOF10: 0 - NO PAIN

## 2024-02-10 ASSESSMENT — COGNITIVE AND FUNCTIONAL STATUS - GENERAL
DAILY ACTIVITIY SCORE: 24
CLIMB 3 TO 5 STEPS WITH RAILING: A LITTLE
MOBILITY SCORE: 23

## 2024-02-10 ASSESSMENT — PAIN DESCRIPTION - LOCATION: LOCATION: SHOULDER

## 2024-02-10 ASSESSMENT — PAIN - FUNCTIONAL ASSESSMENT
PAIN_FUNCTIONAL_ASSESSMENT: 0-10

## 2024-02-10 ASSESSMENT — PAIN DESCRIPTION - ORIENTATION: ORIENTATION: RIGHT

## 2024-02-10 NOTE — CARE PLAN
The patient's goals for the shift include    Problem: Fall/Injury  Goal: Not fall by end of shift  Outcome: Progressing  Goal: Be free from injury by end of the shift  Outcome: Progressing  Goal: Verbalize understanding of personal risk factors for fall in the hospital  Outcome: Progressing  Goal: Verbalize understanding of risk factor reduction measures to prevent injury from fall in the home  Outcome: Progressing  Goal: Use assistive devices by end of the shift  Outcome: Progressing  Goal: Pace activities to prevent fatigue by end of the shift  Outcome: Progressing       The clinical goals for the shift include pt will remain safe and free from falls and injuries

## 2024-02-10 NOTE — CARE PLAN
Problem: Fall/Injury  Goal: Not fall by end of shift  Outcome: Progressing  Goal: Be free from injury by end of the shift  Outcome: Progressing  Goal: Verbalize understanding of personal risk factors for fall in the hospital  Outcome: Progressing  Goal: Verbalize understanding of risk factor reduction measures to prevent injury from fall in the home  Outcome: Progressing  Goal: Use assistive devices by end of the shift  Outcome: Progressing  Goal: Pace activities to prevent fatigue by end of the shift  Outcome: Progressing       The patient's goals for the shift include      The clinical goals for the shift include Pt will remain free from falls and injury throughout shift

## 2024-02-10 NOTE — PROGRESS NOTES
"David Ferreira is a 78 y.o. male on day 5 of admission presenting with IgG multiple myeloma (CMS/HCC).    Subjective   Overall feeling well. Ambulating frequently. Family at bedside. ROS otherwise unremarkable    Objective     Physical Exam  Vitals reviewed.   Constitutional:       Appearance: Normal appearance.   HENT:      Head: Normocephalic.      Mouth/Throat:      Mouth: Mucous membranes are moist.   Eyes:      Extraocular Movements: Extraocular movements intact.      Conjunctiva/sclera: Conjunctivae normal.      Pupils: Pupils are equal, round, and reactive to light.   Cardiovascular:      Rate and Rhythm: Normal rate and regular rhythm.      Pulses: Normal pulses.      Heart sounds: Normal heart sounds.   Pulmonary:      Effort: Pulmonary effort is normal.      Breath sounds: Normal breath sounds.   Abdominal:      General: Bowel sounds are normal.      Palpations: Abdomen is soft.   Musculoskeletal:         General: Normal range of motion.      Cervical back: Normal range of motion.   Skin:     General: Skin is warm and dry.      Capillary Refill: Capillary refill takes less than 2 seconds.   Neurological:      General: No focal deficit present.      Mental Status: He is alert.   Psychiatric:         Mood and Affect: Mood normal.         Behavior: Behavior normal.     Last Recorded Vitals  Blood pressure 119/71, pulse 77, temperature 35.9 °C (96.6 °F), temperature source Temporal, resp. rate 18, height 1.651 m (5' 5\"), weight 74.3 kg (163 lb 12.8 oz), SpO2 98 %.  Intake/Output last 3 Shifts:  No intake/output data recorded.    Assessment/Plan   Principal Problem:    IgG multiple myeloma (CMS/HCC)  Active Problems:    Kappa light chain myeloma (CMS/HCC)    David Ferreira is a 78 y.o. male with IgG [place] Myeloma in [place] admitted for his Autologous SCT with Sonja 140 (T=0, 2/07/24)     T+3 from Autologous SCT prepped with Melphalan 140mg/m2     ONC:  -Hx/o IgG Kappa Multiple Myeloma  -Initially evaluated for " elevated protein  -Bmbx c/w 30-40% kappa restricted plasma cells. FISH neg.   -CT/PET neg  -B2M 3.8, IgG 6072, Kappa  mg/L, K/L ratio 589, normal Ca, Cr, LDH, alb  -3/2023 Started Velcade, Revlimid, and Dex  -Switched to Pratibha, Kyprolis, and Dex 5/2023 with slow response to therapy  - Now in [place]  - Autologous SCT (2/07/24) prepped with Melphalan 140mg/m2    HEME:  #Asymptomatic anemia and thrombocytopenia    ID:  #Prophylaxis: ACV and Fluconazole    FEN/GI:  - Admit wt: 73.9 kg current wt: 74.3 kg   #PUD prophy: Protonix     CV:  ECHO (11/7/23) with EF of 65%     MSK:  - Rt Rotator cuff tear  - Cont Capsaicin and oxycodone as needed     NEURO:  - Peripheral neuropathy B/l LE and 2/2 B/L carpal tunnel syndrome s/p repair.   - Cont Gabapentin and Cymbalta 30mg    Patient seen and discussed with Dr. Contreras

## 2024-02-11 LAB
ALBUMIN SERPL BCP-MCNC: 3.2 G/DL (ref 3.4–5)
ALP SERPL-CCNC: 77 U/L (ref 33–136)
ALT SERPL W P-5'-P-CCNC: 11 U/L (ref 10–52)
ANION GAP SERPL CALC-SCNC: 9 MMOL/L (ref 10–20)
AST SERPL W P-5'-P-CCNC: 12 U/L (ref 9–39)
BASOPHILS # BLD MANUAL: 0 X10*3/UL (ref 0–0.1)
BASOPHILS NFR BLD MANUAL: 0 %
BILIRUB DIRECT SERPL-MCNC: 0.1 MG/DL (ref 0–0.3)
BILIRUB SERPL-MCNC: 0.5 MG/DL (ref 0–1.2)
BUN SERPL-MCNC: 20 MG/DL (ref 6–23)
CALCIUM SERPL-MCNC: 9.3 MG/DL (ref 8.6–10.6)
CHLORIDE SERPL-SCNC: 109 MMOL/L (ref 98–107)
CO2 SERPL-SCNC: 26 MMOL/L (ref 21–32)
CREAT SERPL-MCNC: 0.74 MG/DL (ref 0.5–1.3)
EGFRCR SERPLBLD CKD-EPI 2021: >90 ML/MIN/1.73M*2
EOSINOPHIL # BLD MANUAL: 0.05 X10*3/UL (ref 0–0.4)
EOSINOPHIL NFR BLD MANUAL: 1.8 %
ERYTHROCYTE [DISTWIDTH] IN BLOOD BY AUTOMATED COUNT: 14 % (ref 11.5–14.5)
GLUCOSE SERPL-MCNC: 105 MG/DL (ref 74–99)
HCT VFR BLD AUTO: 27.7 % (ref 41–52)
HGB BLD-MCNC: 9.2 G/DL (ref 13.5–17.5)
IMM GRANULOCYTES # BLD AUTO: 0.02 X10*3/UL (ref 0–0.5)
IMM GRANULOCYTES NFR BLD AUTO: 0.7 % (ref 0–0.9)
LYMPHOCYTES # BLD MANUAL: 0.05 X10*3/UL (ref 0.8–3)
LYMPHOCYTES NFR BLD MANUAL: 1.7 %
MAGNESIUM SERPL-MCNC: 2.04 MG/DL (ref 1.6–2.4)
MCH RBC QN AUTO: 35 PG (ref 26–34)
MCHC RBC AUTO-ENTMCNC: 33.2 G/DL (ref 32–36)
MCV RBC AUTO: 105 FL (ref 80–100)
MONOCYTES # BLD MANUAL: 0 X10*3/UL (ref 0.05–0.8)
MONOCYTES NFR BLD MANUAL: 0 %
NEUTS SEG # BLD MANUAL: 2.8 X10*3/UL (ref 1.6–5)
NEUTS SEG NFR BLD MANUAL: 96.5 %
NRBC BLD-RTO: 0 /100 WBCS (ref 0–0)
PHOSPHATE SERPL-MCNC: 2.9 MG/DL (ref 2.5–4.9)
PLATELET # BLD AUTO: 85 X10*3/UL (ref 150–450)
POTASSIUM SERPL-SCNC: 3.8 MMOL/L (ref 3.5–5.3)
PROT SERPL-MCNC: 5.3 G/DL (ref 6.4–8.2)
RBC # BLD AUTO: 2.63 X10*6/UL (ref 4.5–5.9)
RBC MORPH BLD: ABNORMAL
SODIUM SERPL-SCNC: 140 MMOL/L (ref 136–145)
TOTAL CELLS COUNTED BLD: 115
URATE SERPL-MCNC: 3.9 MG/DL (ref 4–7.5)
WBC # BLD AUTO: 2.9 X10*3/UL (ref 4.4–11.3)

## 2024-02-11 PROCEDURE — 80053 COMPREHEN METABOLIC PANEL: CPT | Performed by: PHYSICIAN ASSISTANT

## 2024-02-11 PROCEDURE — 99233 SBSQ HOSP IP/OBS HIGH 50: CPT | Performed by: INTERNAL MEDICINE

## 2024-02-11 PROCEDURE — 85027 COMPLETE CBC AUTOMATED: CPT | Performed by: PHYSICIAN ASSISTANT

## 2024-02-11 PROCEDURE — 2500000002 HC RX 250 W HCPCS SELF ADMINISTERED DRUGS (ALT 637 FOR MEDICARE OP, ALT 636 FOR OP/ED): Performed by: PHYSICIAN ASSISTANT

## 2024-02-11 PROCEDURE — 2500000001 HC RX 250 WO HCPCS SELF ADMINISTERED DRUGS (ALT 637 FOR MEDICARE OP): Performed by: INTERNAL MEDICINE

## 2024-02-11 PROCEDURE — 2500000001 HC RX 250 WO HCPCS SELF ADMINISTERED DRUGS (ALT 637 FOR MEDICARE OP): Performed by: PHYSICIAN ASSISTANT

## 2024-02-11 PROCEDURE — 84550 ASSAY OF BLOOD/URIC ACID: CPT | Performed by: PHYSICIAN ASSISTANT

## 2024-02-11 PROCEDURE — 82248 BILIRUBIN DIRECT: CPT | Performed by: PHYSICIAN ASSISTANT

## 2024-02-11 PROCEDURE — 85007 BL SMEAR W/DIFF WBC COUNT: CPT | Performed by: PHYSICIAN ASSISTANT

## 2024-02-11 PROCEDURE — 83735 ASSAY OF MAGNESIUM: CPT | Performed by: PHYSICIAN ASSISTANT

## 2024-02-11 PROCEDURE — 84100 ASSAY OF PHOSPHORUS: CPT | Performed by: PHYSICIAN ASSISTANT

## 2024-02-11 PROCEDURE — 1170000001 HC PRIVATE ONCOLOGY ROOM DAILY

## 2024-02-11 RX ADMIN — ACYCLOVIR 400 MG: 400 TABLET ORAL at 08:46

## 2024-02-11 RX ADMIN — Medication 10 MG: at 20:48

## 2024-02-11 RX ADMIN — GABAPENTIN 600 MG: 300 CAPSULE ORAL at 20:48

## 2024-02-11 RX ADMIN — PANTOPRAZOLE SODIUM 40 MG: 40 TABLET, DELAYED RELEASE ORAL at 05:59

## 2024-02-11 RX ADMIN — DULOXETINE HYDROCHLORIDE 30 MG: 30 CAPSULE, DELAYED RELEASE ORAL at 20:48

## 2024-02-11 RX ADMIN — GABAPENTIN 300 MG: 300 CAPSULE ORAL at 08:46

## 2024-02-11 RX ADMIN — ACYCLOVIR 400 MG: 400 TABLET ORAL at 20:49

## 2024-02-11 RX ADMIN — GABAPENTIN 300 MG: 300 CAPSULE ORAL at 14:25

## 2024-02-11 RX ADMIN — FLUCONAZOLE 400 MG: 200 TABLET ORAL at 08:46

## 2024-02-11 RX ADMIN — LEVOFLOXACIN 500 MG: 500 TABLET, FILM COATED ORAL at 08:46

## 2024-02-11 ASSESSMENT — COGNITIVE AND FUNCTIONAL STATUS - GENERAL
MOBILITY SCORE: 24
MOBILITY SCORE: 24
DAILY ACTIVITIY SCORE: 24
DAILY ACTIVITIY SCORE: 24

## 2024-02-11 ASSESSMENT — PAIN SCALES - GENERAL
PAINLEVEL_OUTOF10: 0 - NO PAIN

## 2024-02-11 ASSESSMENT — PAIN - FUNCTIONAL ASSESSMENT
PAIN_FUNCTIONAL_ASSESSMENT: 0-10
PAIN_FUNCTIONAL_ASSESSMENT: 0-10

## 2024-02-11 NOTE — PROGRESS NOTES
"David Ferreira is a 78 y.o. male on day 6 of admission presenting with IgG multiple myeloma (CMS/HCC).    Subjective   Overall feeling well. Ambulating frequently. ROS otherwise unremarkable    Objective     Physical Exam  Vitals reviewed.   Constitutional:       Appearance: Normal appearance.   HENT:      Head: Normocephalic.      Mouth/Throat:      Mouth: Mucous membranes are moist.   Eyes:      Extraocular Movements: Extraocular movements intact.      Conjunctiva/sclera: Conjunctivae normal.      Pupils: Pupils are equal, round, and reactive to light.   Cardiovascular:      Rate and Rhythm: Normal rate and regular rhythm.      Pulses: Normal pulses.      Heart sounds: Normal heart sounds.   Pulmonary:      Effort: Pulmonary effort is normal.      Breath sounds: Normal breath sounds.   Abdominal:      General: Bowel sounds are normal.      Palpations: Abdomen is soft.   Musculoskeletal:         General: Normal range of motion.      Cervical back: Normal range of motion.   Skin:     General: Skin is warm and dry.      Capillary Refill: Capillary refill takes less than 2 seconds.   Neurological:      General: No focal deficit present.      Mental Status: He is alert.   Psychiatric:         Mood and Affect: Mood normal.         Behavior: Behavior normal.     Last Recorded Vitals  Blood pressure 145/84, pulse 82, temperature 36.2 °C (97.2 °F), temperature source Temporal, resp. rate 18, height 1.651 m (5' 5\"), weight 73.8 kg (162 lb 11.2 oz), SpO2 98 %.  Intake/Output last 3 Shifts:  No intake/output data recorded.    Assessment/Plan   Principal Problem:    IgG multiple myeloma (CMS/HCC)  Active Problems:    Kappa light chain myeloma (CMS/HCC)    David Ferreira is a 78 y.o. male with IgG [place] Myeloma in [place] admitted for his Autologous SCT with Sonja 140 (T=0, 2/07/24)     T+4 from Autologous SCT prepped with Melphalan 140mg/m2     ONC:  -Hx/o IgG Kappa Multiple Myeloma  -Initially evaluated for elevated " protein  -Bmbx c/w 30-40% kappa restricted plasma cells. FISH neg.   -CT/PET neg  -B2M 3.8, IgG 6072, Kappa  mg/L, K/L ratio 589, normal Ca, Cr, LDH, alb  -3/2023 Started Velcade, Revlimid, and Dex  -Switched to Pratibha, Kyprolis, and Dex 5/2023 with slow response to therapy  - Now in [place]  - Autologous SCT (2/07/24) prepped with Melphalan 140mg/m2    HEME:  #Asymptomatic anemia and thrombocytopenia    ID:  #Prophylaxis: ACV and Fluconazole    FEN/GI:  - Admit wt: 73.9 kg current wt: 73.8kg  #PUD prophy: Protonix     CV:  ECHO (11/7/23) with EF of 65%     MSK:  - Rt Rotator cuff tear  - Cont Capsaicin and oxycodone as needed     NEURO:  - Peripheral neuropathy B/l LE and 2/2 B/L carpal tunnel syndrome s/p repair.   - Cont Gabapentin and Cymbalta 30mg    DISPO;  -Full code  -Trialysis  -Primary oncologist: Dr. Contreras    Patient seen and discussed with Dr. Contreras

## 2024-02-11 NOTE — CARE PLAN
Problem: Fall/Injury  Goal: Not fall by end of shift  Outcome: Progressing  Goal: Be free from injury by end of the shift  Outcome: Progressing  Goal: Verbalize understanding of personal risk factors for fall in the hospital  Outcome: Progressing  Goal: Verbalize understanding of risk factor reduction measures to prevent injury from fall in the home  Outcome: Progressing  Goal: Use assistive devices by end of the shift  Outcome: Progressing  Goal: Pace activities to prevent fatigue by end of the shift  Outcome: Progressing    The clinical goals for the shift include VSS, remain free from fall/injury; adequate rest    VSS on RA, no reports of pain. Pt resting on and off throughout night. Remains free from fall/injury.

## 2024-02-11 NOTE — CARE PLAN
The patient's goals for the shift include      The clinical goals for the shift include pt will remain free from fall/injury throughout shift.    Ove  Problem: Fall/Injury  Goal: Not fall by end of shift  Outcome: Progressing  Goal: Be free from injury by end of the shift  Outcome: Progressing  Goal: Verbalize understanding of personal risk factors for fall in the hospital  Outcome: Progressing  Goal: Verbalize understanding of risk factor reduction measures to prevent injury from fall in the home  Outcome: Progressing  Goal: Use assistive devices by end of the shift  Outcome: Progressing  Goal: Pace activities to prevent fatigue by end of the shift  Outcome: Progressing

## 2024-02-12 LAB
ALBUMIN SERPL BCP-MCNC: 3.1 G/DL (ref 3.4–5)
ALP SERPL-CCNC: 73 U/L (ref 33–136)
ALT SERPL W P-5'-P-CCNC: 10 U/L (ref 10–52)
ANION GAP SERPL CALC-SCNC: 8 MMOL/L (ref 10–20)
APTT PPP: 28 SECONDS (ref 27–38)
AST SERPL W P-5'-P-CCNC: 12 U/L (ref 9–39)
BASOPHILS # BLD MANUAL: 0 X10*3/UL (ref 0–0.1)
BASOPHILS NFR BLD MANUAL: 0 %
BILIRUB DIRECT SERPL-MCNC: 0.1 MG/DL (ref 0–0.3)
BILIRUB SERPL-MCNC: 0.5 MG/DL (ref 0–1.2)
BUN SERPL-MCNC: 18 MG/DL (ref 6–23)
CALCIUM SERPL-MCNC: 9.2 MG/DL (ref 8.6–10.6)
CHLORIDE SERPL-SCNC: 107 MMOL/L (ref 98–107)
CO2 SERPL-SCNC: 27 MMOL/L (ref 21–32)
CREAT SERPL-MCNC: 0.69 MG/DL (ref 0.5–1.3)
EGFRCR SERPLBLD CKD-EPI 2021: >90 ML/MIN/1.73M*2
EOSINOPHIL # BLD MANUAL: 0 X10*3/UL (ref 0–0.4)
EOSINOPHIL NFR BLD MANUAL: 0 %
ERYTHROCYTE [DISTWIDTH] IN BLOOD BY AUTOMATED COUNT: 14.2 % (ref 11.5–14.5)
GLUCOSE SERPL-MCNC: 98 MG/DL (ref 74–99)
HCT VFR BLD AUTO: 26.7 % (ref 41–52)
HGB BLD-MCNC: 8.9 G/DL (ref 13.5–17.5)
IMM GRANULOCYTES # BLD AUTO: 0.01 X10*3/UL (ref 0–0.5)
IMM GRANULOCYTES NFR BLD AUTO: 0.6 % (ref 0–0.9)
INR PPP: 1.2 (ref 0.9–1.1)
LYMPHOCYTES # BLD MANUAL: 0 X10*3/UL (ref 0.8–3)
LYMPHOCYTES NFR BLD MANUAL: 0 %
MAGNESIUM SERPL-MCNC: 1.94 MG/DL (ref 1.6–2.4)
MCH RBC QN AUTO: 35.2 PG (ref 26–34)
MCHC RBC AUTO-ENTMCNC: 33.3 G/DL (ref 32–36)
MCV RBC AUTO: 106 FL (ref 80–100)
MONOCYTES # BLD MANUAL: 0 X10*3/UL (ref 0.05–0.8)
MONOCYTES NFR BLD MANUAL: 0 %
NEUTS SEG # BLD MANUAL: 1.7 X10*3/UL (ref 1.6–5)
NEUTS SEG NFR BLD MANUAL: 100 %
NRBC BLD-RTO: 0 /100 WBCS (ref 0–0)
PHOSPHATE SERPL-MCNC: 3 MG/DL (ref 2.5–4.9)
PLATELET # BLD AUTO: 73 X10*3/UL (ref 150–450)
POTASSIUM SERPL-SCNC: 3.6 MMOL/L (ref 3.5–5.3)
PROT SERPL-MCNC: 5.1 G/DL (ref 6.4–8.2)
PROTHROMBIN TIME: 13.7 SECONDS (ref 9.8–12.8)
RBC # BLD AUTO: 2.53 X10*6/UL (ref 4.5–5.9)
RBC MORPH BLD: ABNORMAL
SODIUM SERPL-SCNC: 138 MMOL/L (ref 136–145)
TOTAL CELLS COUNTED BLD: 115
URATE SERPL-MCNC: 4.6 MG/DL (ref 4–7.5)
WBC # BLD AUTO: 1.7 X10*3/UL (ref 4.4–11.3)

## 2024-02-12 PROCEDURE — 84100 ASSAY OF PHOSPHORUS: CPT | Performed by: PHYSICIAN ASSISTANT

## 2024-02-12 PROCEDURE — 2500000004 HC RX 250 GENERAL PHARMACY W/ HCPCS (ALT 636 FOR OP/ED): Performed by: NURSE PRACTITIONER

## 2024-02-12 PROCEDURE — 84550 ASSAY OF BLOOD/URIC ACID: CPT | Performed by: PHYSICIAN ASSISTANT

## 2024-02-12 PROCEDURE — 2500000004 HC RX 250 GENERAL PHARMACY W/ HCPCS (ALT 636 FOR OP/ED): Mod: JZ | Performed by: INTERNAL MEDICINE

## 2024-02-12 PROCEDURE — 2500000002 HC RX 250 W HCPCS SELF ADMINISTERED DRUGS (ALT 637 FOR MEDICARE OP, ALT 636 FOR OP/ED): Performed by: PHYSICIAN ASSISTANT

## 2024-02-12 PROCEDURE — 82248 BILIRUBIN DIRECT: CPT | Performed by: PHYSICIAN ASSISTANT

## 2024-02-12 PROCEDURE — 2500000001 HC RX 250 WO HCPCS SELF ADMINISTERED DRUGS (ALT 637 FOR MEDICARE OP): Performed by: PHYSICIAN ASSISTANT

## 2024-02-12 PROCEDURE — 2500000001 HC RX 250 WO HCPCS SELF ADMINISTERED DRUGS (ALT 637 FOR MEDICARE OP): Performed by: INTERNAL MEDICINE

## 2024-02-12 PROCEDURE — 83735 ASSAY OF MAGNESIUM: CPT | Performed by: PHYSICIAN ASSISTANT

## 2024-02-12 PROCEDURE — 85610 PROTHROMBIN TIME: CPT | Performed by: PHYSICIAN ASSISTANT

## 2024-02-12 PROCEDURE — 99233 SBSQ HOSP IP/OBS HIGH 50: CPT | Performed by: INTERNAL MEDICINE

## 2024-02-12 PROCEDURE — 85007 BL SMEAR W/DIFF WBC COUNT: CPT | Performed by: PHYSICIAN ASSISTANT

## 2024-02-12 PROCEDURE — 85027 COMPLETE CBC AUTOMATED: CPT | Performed by: PHYSICIAN ASSISTANT

## 2024-02-12 PROCEDURE — 1170000001 HC PRIVATE ONCOLOGY ROOM DAILY

## 2024-02-12 PROCEDURE — 87493 C DIFF AMPLIFIED PROBE: CPT

## 2024-02-12 RX ADMIN — GABAPENTIN 300 MG: 300 CAPSULE ORAL at 13:06

## 2024-02-12 RX ADMIN — ACYCLOVIR 400 MG: 400 TABLET ORAL at 20:31

## 2024-02-12 RX ADMIN — GABAPENTIN 300 MG: 300 CAPSULE ORAL at 08:19

## 2024-02-12 RX ADMIN — SODIUM CHLORIDE 500 ML: 9 INJECTION, SOLUTION INTRAVENOUS at 13:05

## 2024-02-12 RX ADMIN — GABAPENTIN 600 MG: 300 CAPSULE ORAL at 20:31

## 2024-02-12 RX ADMIN — FILGRASTIM-SNDZ 480 MCG: 480 INJECTION, SOLUTION INTRAVENOUS; SUBCUTANEOUS at 13:05

## 2024-02-12 RX ADMIN — PANTOPRAZOLE SODIUM 40 MG: 40 TABLET, DELAYED RELEASE ORAL at 08:19

## 2024-02-12 RX ADMIN — Medication 10 MG: at 20:31

## 2024-02-12 RX ADMIN — FLUCONAZOLE 400 MG: 200 TABLET ORAL at 08:19

## 2024-02-12 RX ADMIN — ACYCLOVIR 400 MG: 400 TABLET ORAL at 08:19

## 2024-02-12 RX ADMIN — DULOXETINE HYDROCHLORIDE 30 MG: 30 CAPSULE, DELAYED RELEASE ORAL at 20:31

## 2024-02-12 RX ADMIN — LEVOFLOXACIN 500 MG: 500 TABLET, FILM COATED ORAL at 08:19

## 2024-02-12 ASSESSMENT — PAIN - FUNCTIONAL ASSESSMENT
PAIN_FUNCTIONAL_ASSESSMENT: 0-10
PAIN_FUNCTIONAL_ASSESSMENT: 0-10

## 2024-02-12 ASSESSMENT — COGNITIVE AND FUNCTIONAL STATUS - GENERAL
MOBILITY SCORE: 24
DAILY ACTIVITIY SCORE: 24
DAILY ACTIVITIY SCORE: 24
MOBILITY SCORE: 24

## 2024-02-12 ASSESSMENT — PAIN SCALES - GENERAL
PAINLEVEL_OUTOF10: 0 - NO PAIN
PAINLEVEL_OUTOF10: 0 - NO PAIN

## 2024-02-12 NOTE — CARE PLAN
The patient's goals for the shift include  remain free of injury.     The clinical goals for the shift include Patient will remain free from injury and falls this shift.    VSS; afebrile this shift.  Patient had no c/o pain this shift; no c/o n/v, but stated stool was loose this AM; will continue to monitor.  Patient received 500 ml NS bolus over three hours this shift per order.  Started subcutaneous Filgrastim this shift per order. Patient up in room and in hallways ad anastasia; remained safe and free of injury.

## 2024-02-12 NOTE — PROGRESS NOTES
"David Ferreira is a 78 y.o. male on day 7 of admission presenting with IgG multiple myeloma (CMS/HCC).    Subjective   Overall feeling well. Ambulating frequently. Stools becoming soft. ROS otherwise unremarkable    Objective     Physical Exam  Vitals reviewed.   Constitutional:       Appearance: Normal appearance.   HENT:      Head: Normocephalic.      Mouth/Throat:      Mouth: Mucous membranes are moist.   Eyes:      Extraocular Movements: Extraocular movements intact.      Conjunctiva/sclera: Conjunctivae normal.      Pupils: Pupils are equal, round, and reactive to light.   Cardiovascular:      Rate and Rhythm: Normal rate and regular rhythm.      Pulses: Normal pulses.      Heart sounds: Normal heart sounds.   Pulmonary:      Effort: Pulmonary effort is normal.      Breath sounds: Normal breath sounds.   Abdominal:      General: Bowel sounds are normal.      Palpations: Abdomen is soft.   Musculoskeletal:         General: Normal range of motion.      Cervical back: Normal range of motion.   Skin:     General: Skin is warm and dry.      Capillary Refill: Capillary refill takes less than 2 seconds.   Neurological:      General: No focal deficit present.      Mental Status: He is alert.   Psychiatric:         Mood and Affect: Mood normal.         Behavior: Behavior normal.     Last Recorded Vitals  Blood pressure 137/85, pulse 75, temperature 36.1 °C (97 °F), temperature source Temporal, resp. rate 18, height 1.651 m (5' 5\"), weight 73.8 kg (162 lb 11.2 oz), SpO2 98 %.  Intake/Output last 3 Shifts:  No intake/output data recorded.    Assessment/Plan   Principal Problem:    IgG multiple myeloma (CMS/HCC)  Active Problems:    Kappa light chain myeloma (CMS/HCC)    David Ferreira is a 78 y.o. male with IgG [place] Myeloma in [place] admitted for his Autologous SCT with Sonja 140 (T=0, 2/07/24)     T+5 from Autologous SCT prepped with Melphalan 140mg/m2     ONC:  -Hx/o IgG Kappa Multiple Myeloma  -Initially evaluated for " elevated protein  -Bmbx c/w 30-40% kappa restricted plasma cells. FISH neg.   -CT/PET neg  -B2M 3.8, IgG 6072, Kappa  mg/L, K/L ratio 589, normal Ca, Cr, LDH, alb  -3/2023 Started Velcade, Revlimid, and Dex  -Switched to Pratibha, Kyprolis, and Dex 5/2023 with slow response to therapy  - Now in [place]  - Autologous SCT (2/07/24) prepped with Melphalan 140mg/m2    HEME:  #Asymptomatic anemia and thrombocytopenia  - transfuse for hgb <7, plt <10  - Neupogen 2/12-->    ID:  #Prophylaxis: ACV, Fluconazole, Levofloxacin    FEN/GI:  - Admit wt: 73.9 kg current wt: 73.8kg  #PUD prophy: Protonix    CV:  ECHO (11/7/23) with EF of 65%     MSK:  - Rt Rotator cuff tear  - Cont Capsaicin and oxycodone as needed     NEURO:  - Peripheral neuropathy B/l LE and 2/2 B/L carpal tunnel syndrome s/p repair.   - Cont Gabapentin and Cymbalta 30mg    DISPO;  -Full code  -Trialysis  -Primary oncologist: Dr. Contreras    Patient seen and discussed with Dr. Freire

## 2024-02-13 LAB
ALBUMIN SERPL BCP-MCNC: 3.2 G/DL (ref 3.4–5)
ALP SERPL-CCNC: 74 U/L (ref 33–136)
ALT SERPL W P-5'-P-CCNC: 10 U/L (ref 10–52)
ANION GAP SERPL CALC-SCNC: 9 MMOL/L (ref 10–20)
AST SERPL W P-5'-P-CCNC: 12 U/L (ref 9–39)
BASOPHILS # BLD AUTO: 0 X10*3/UL (ref 0–0.1)
BASOPHILS NFR BLD AUTO: 0 %
BILIRUB DIRECT SERPL-MCNC: 0.1 MG/DL (ref 0–0.3)
BILIRUB SERPL-MCNC: 0.5 MG/DL (ref 0–1.2)
BUN SERPL-MCNC: 14 MG/DL (ref 6–23)
C DIF TOX TCDA+TCDB STL QL NAA+PROBE: NOT DETECTED
CALCIUM SERPL-MCNC: 9 MG/DL (ref 8.6–10.6)
CHLORIDE SERPL-SCNC: 107 MMOL/L (ref 98–107)
CO2 SERPL-SCNC: 27 MMOL/L (ref 21–32)
CREAT SERPL-MCNC: 0.78 MG/DL (ref 0.5–1.3)
EGFRCR SERPLBLD CKD-EPI 2021: >90 ML/MIN/1.73M*2
EOSINOPHIL # BLD AUTO: 0 X10*3/UL (ref 0–0.4)
EOSINOPHIL NFR BLD AUTO: 0 %
ERYTHROCYTE [DISTWIDTH] IN BLOOD BY AUTOMATED COUNT: 14.1 % (ref 11.5–14.5)
GLUCOSE SERPL-MCNC: 99 MG/DL (ref 74–99)
HCT VFR BLD AUTO: 26.2 % (ref 41–52)
HGB BLD-MCNC: 8.7 G/DL (ref 13.5–17.5)
IMM GRANULOCYTES # BLD AUTO: 0.04 X10*3/UL (ref 0–0.5)
IMM GRANULOCYTES NFR BLD AUTO: 2.1 % (ref 0–0.9)
LYMPHOCYTES # BLD AUTO: 0.01 X10*3/UL (ref 0.8–3)
LYMPHOCYTES NFR BLD AUTO: 0.5 %
MAGNESIUM SERPL-MCNC: 1.91 MG/DL (ref 1.6–2.4)
MCH RBC QN AUTO: 34.7 PG (ref 26–34)
MCHC RBC AUTO-ENTMCNC: 33.2 G/DL (ref 32–36)
MCV RBC AUTO: 104 FL (ref 80–100)
MONOCYTES # BLD AUTO: 0 X10*3/UL (ref 0.05–0.8)
MONOCYTES NFR BLD AUTO: 0 %
NEUTROPHILS # BLD AUTO: 1.83 X10*3/UL (ref 1.6–5.5)
NEUTROPHILS NFR BLD AUTO: 97.4 %
NRBC BLD-RTO: 0 /100 WBCS (ref 0–0)
PHOSPHATE SERPL-MCNC: 3.1 MG/DL (ref 2.5–4.9)
PLATELET # BLD AUTO: 47 X10*3/UL (ref 150–450)
POTASSIUM SERPL-SCNC: 3.5 MMOL/L (ref 3.5–5.3)
PROT SERPL-MCNC: 5 G/DL (ref 6.4–8.2)
RBC # BLD AUTO: 2.51 X10*6/UL (ref 4.5–5.9)
RBC MORPH BLD: NORMAL
SODIUM SERPL-SCNC: 139 MMOL/L (ref 136–145)
URATE SERPL-MCNC: 4.6 MG/DL (ref 4–7.5)
WBC # BLD AUTO: 1.9 X10*3/UL (ref 4.4–11.3)

## 2024-02-13 PROCEDURE — 2500000004 HC RX 250 GENERAL PHARMACY W/ HCPCS (ALT 636 FOR OP/ED): Mod: JZ | Performed by: INTERNAL MEDICINE

## 2024-02-13 PROCEDURE — 84100 ASSAY OF PHOSPHORUS: CPT | Performed by: PHYSICIAN ASSISTANT

## 2024-02-13 PROCEDURE — 83735 ASSAY OF MAGNESIUM: CPT | Performed by: PHYSICIAN ASSISTANT

## 2024-02-13 PROCEDURE — 97110 THERAPEUTIC EXERCISES: CPT | Mod: GP

## 2024-02-13 PROCEDURE — 2500000002 HC RX 250 W HCPCS SELF ADMINISTERED DRUGS (ALT 637 FOR MEDICARE OP, ALT 636 FOR OP/ED): Performed by: PHYSICIAN ASSISTANT

## 2024-02-13 PROCEDURE — 1170000001 HC PRIVATE ONCOLOGY ROOM DAILY

## 2024-02-13 PROCEDURE — 2500000001 HC RX 250 WO HCPCS SELF ADMINISTERED DRUGS (ALT 637 FOR MEDICARE OP): Performed by: PHYSICIAN ASSISTANT

## 2024-02-13 PROCEDURE — 2500000001 HC RX 250 WO HCPCS SELF ADMINISTERED DRUGS (ALT 637 FOR MEDICARE OP)

## 2024-02-13 PROCEDURE — 84550 ASSAY OF BLOOD/URIC ACID: CPT | Performed by: PHYSICIAN ASSISTANT

## 2024-02-13 PROCEDURE — 80053 COMPREHEN METABOLIC PANEL: CPT | Performed by: PHYSICIAN ASSISTANT

## 2024-02-13 PROCEDURE — 97530 THERAPEUTIC ACTIVITIES: CPT | Mod: GP

## 2024-02-13 PROCEDURE — 84075 ASSAY ALKALINE PHOSPHATASE: CPT | Performed by: PHYSICIAN ASSISTANT

## 2024-02-13 PROCEDURE — 2500000001 HC RX 250 WO HCPCS SELF ADMINISTERED DRUGS (ALT 637 FOR MEDICARE OP): Performed by: INTERNAL MEDICINE

## 2024-02-13 PROCEDURE — 2500000004 HC RX 250 GENERAL PHARMACY W/ HCPCS (ALT 636 FOR OP/ED): Performed by: PHYSICIAN ASSISTANT

## 2024-02-13 PROCEDURE — 85025 COMPLETE CBC W/AUTO DIFF WBC: CPT | Performed by: PHYSICIAN ASSISTANT

## 2024-02-13 RX ORDER — LOPERAMIDE HYDROCHLORIDE 2 MG/1
2 CAPSULE ORAL 4 TIMES DAILY PRN
Status: DISCONTINUED | OUTPATIENT
Start: 2024-02-13 | End: 2024-02-22 | Stop reason: HOSPADM

## 2024-02-13 RX ADMIN — GABAPENTIN 300 MG: 300 CAPSULE ORAL at 14:04

## 2024-02-13 RX ADMIN — GABAPENTIN 300 MG: 300 CAPSULE ORAL at 08:15

## 2024-02-13 RX ADMIN — PANTOPRAZOLE SODIUM 40 MG: 40 TABLET, DELAYED RELEASE ORAL at 08:14

## 2024-02-13 RX ADMIN — SODIUM CHLORIDE 500 ML: 9 INJECTION, SOLUTION INTRAVENOUS at 15:54

## 2024-02-13 RX ADMIN — DULOXETINE HYDROCHLORIDE 30 MG: 30 CAPSULE, DELAYED RELEASE ORAL at 20:52

## 2024-02-13 RX ADMIN — PROCHLORPERAZINE MALEATE 10 MG: 10 TABLET ORAL at 02:06

## 2024-02-13 RX ADMIN — Medication 10 MG: at 20:51

## 2024-02-13 RX ADMIN — ACYCLOVIR 400 MG: 400 TABLET ORAL at 08:15

## 2024-02-13 RX ADMIN — FILGRASTIM-SNDZ 480 MCG: 480 INJECTION, SOLUTION INTRAVENOUS; SUBCUTANEOUS at 14:04

## 2024-02-13 RX ADMIN — LOPERAMIDE HYDROCHLORIDE 2 MG: 2 CAPSULE ORAL at 18:20

## 2024-02-13 RX ADMIN — ACYCLOVIR 400 MG: 400 TABLET ORAL at 20:51

## 2024-02-13 RX ADMIN — LEVOFLOXACIN 500 MG: 500 TABLET, FILM COATED ORAL at 08:15

## 2024-02-13 RX ADMIN — FLUCONAZOLE 400 MG: 200 TABLET ORAL at 08:15

## 2024-02-13 RX ADMIN — GABAPENTIN 600 MG: 300 CAPSULE ORAL at 20:51

## 2024-02-13 ASSESSMENT — COGNITIVE AND FUNCTIONAL STATUS - GENERAL
MOBILITY SCORE: 23
TURNING FROM BACK TO SIDE WHILE IN FLAT BAD: A LITTLE
STANDING UP FROM CHAIR USING ARMS: A LITTLE
MOVING TO AND FROM BED TO CHAIR: A LITTLE
DAILY ACTIVITIY SCORE: 24
MOVING FROM LYING ON BACK TO SITTING ON SIDE OF FLAT BED WITH BEDRAILS: A LITTLE
MOBILITY SCORE: 18
CLIMB 3 TO 5 STEPS WITH RAILING: A LITTLE
CLIMB 3 TO 5 STEPS WITH RAILING: A LITTLE
WALKING IN HOSPITAL ROOM: A LITTLE

## 2024-02-13 ASSESSMENT — PAIN SCALES - GENERAL
PAINLEVEL_OUTOF10: 0 - NO PAIN

## 2024-02-13 ASSESSMENT — PAIN - FUNCTIONAL ASSESSMENT: PAIN_FUNCTIONAL_ASSESSMENT: 0-10

## 2024-02-13 NOTE — PROGRESS NOTES
02/06/24 1122    Discharge Planning   Living Arrangements Spouse/significant other   Support Systems Spouse/significant other;Children;Family members   Assistance Needed Pt needs some assistance with fine motor hand functions like opening packages.   Type of Residence Private residence   Number of Stairs to Enter Residence 0   Number of Stairs Within Residence 0   Home or Post Acute Services None   Patient expects to be discharged to: home   Does the patient need discharge transport arranged? No   Financial Resource Strain   How hard is it for you to pay for the very basics like food, housing, medical care, and heating? Not hard   Housing Stability   In the last 12 months, was there a time when you were not able to pay the mortgage or rent on time? N   In the last 12 months, how many places have you lived? 1   In the last 12 months, was there a time when you did not have a steady place to sleep or slept in a shelter (including now)? N   Transportation Needs   In the past 12 months, has lack of transportation kept you from medical appointments or from getting medications? no   In the past 12 months, has lack of transportation kept you from meetings, work, or from getting things needed for daily living? No      Pt with Multiple Myeloma was admitted for an Auto PBSCT, T=0 2/7/24. He will be returning home after discharge with the assistance of his wife and daughter, who will be his caregivers.  He is still independent,  just has some limitations of his right hand due to a prior surgery.  He states he has DME at home but isn't currently needing to use them.  He has a right internal jugular catheter which will be pulled prior to discharge.  His MD is Dr. Contreras/Vishal Harrell, [facility].  Will continue to follow for any home going needs.    2/13/24 at 16:20- Pt is T+ 6 s/p his Auto PBSCT, waiting on count recovery.  PT is recommending home care for PT- will discuss this with pt closer to time of discharge (~1 week).  Will  continue to follow for home going needs.  Maritza Aguirre.

## 2024-02-13 NOTE — PROGRESS NOTES
"David Ferreira is a 78 y.o. male on day 8 of admission presenting with IgG multiple myeloma (CMS/HCC).    Subjective   Patient had diarrhea which improved. He denies any n/v/f/c/d. Overnight he had some hiccups and dry heaves but improved this morning. He is drinking fluids but not eating much solid food. Decreased appetite and food does not taste good. He is willing to try supplements. Ambulating without difficulty. No pain reported. No CP, SOB. No urinary complaints. No swelling. Slight headache that has improved. No bleeding. No dizziness.        Objective     Physical Exam  Constitutional:       General: He is not in acute distress.  HENT:      Head: Normocephalic and atraumatic.      Mouth/Throat:      Mouth: Mucous membranes are moist.      Pharynx: No oropharyngeal exudate or posterior oropharyngeal erythema.   Eyes:      Extraocular Movements: Extraocular movements intact.      Pupils: Pupils are equal, round, and reactive to light.   Cardiovascular:      Rate and Rhythm: Normal rate and regular rhythm.      Heart sounds: No murmur heard.     No friction rub. No gallop.   Pulmonary:      Effort: No respiratory distress.      Breath sounds: Normal breath sounds. No wheezing or rhonchi.   Abdominal:      General: Bowel sounds are normal.      Palpations: Abdomen is soft.      Tenderness: There is no abdominal tenderness.   Musculoskeletal:         General: Normal range of motion.      Right lower leg: No edema.      Left lower leg: No edema.   Skin:     General: Skin is warm and dry.   Neurological:      Mental Status: He is alert and oriented to person, place, and time.      Motor: No weakness.   Psychiatric:         Mood and Affect: Mood normal.         Behavior: Behavior normal.         Last Recorded Vitals  Blood pressure 108/68, pulse 85, temperature 36.7 °C (98.1 °F), temperature source Temporal, resp. rate 18, height 1.651 m (5' 5\"), weight 73.7 kg (162 lb 7.7 oz), SpO2 98 %.  Intake/Output last 3 " Shifts:  I/O last 3 completed shifts:  In: 500.1 (6.8 mL/kg) [IV Piggyback:500.1]  Out: - (0 mL/kg)   Weight: 73.7 kg     Relevant Results  Scheduled medications  acyclovir, 400 mg, oral, q12h  DULoxetine, 30 mg, oral, Nightly  filgrastim or biosimilar, 480 mcg, subcutaneous, q24h  fluconazole, 400 mg, oral, Daily  gabapentin, 300 mg, oral, BID  gabapentin, 600 mg, oral, Nightly  levoFLOXacin, 500 mg, oral, Daily  melatonin, 10 mg, oral, Nightly  pantoprazole, 40 mg, oral, Daily before breakfast      Continuous medications     PRN medications  PRN medications: alteplase, capsaicin, loperamide, oxyCODONE, prochlorperazine, prochlorperazine    Results for orders placed or performed during the hospital encounter of 02/05/24 (from the past 24 hour(s))   C. difficile, PCR    Specimen: Stool   Result Value Ref Range    C. difficile, PCR Not Detected Not Detected   Uric Acid   Result Value Ref Range    Uric Acid 4.6 4.0 - 7.5 mg/dL   Magnesium   Result Value Ref Range    Magnesium 1.91 1.60 - 2.40 mg/dL   CBC and Auto Differential   Result Value Ref Range    WBC 1.9 (L) 4.4 - 11.3 x10*3/uL    nRBC 0.0 0.0 - 0.0 /100 WBCs    RBC 2.51 (L) 4.50 - 5.90 x10*6/uL    Hemoglobin 8.7 (L) 13.5 - 17.5 g/dL    Hematocrit 26.2 (L) 41.0 - 52.0 %     (H) 80 - 100 fL    MCH 34.7 (H) 26.0 - 34.0 pg    MCHC 33.2 32.0 - 36.0 g/dL    RDW 14.1 11.5 - 14.5 %    Platelets 47 (L) 150 - 450 x10*3/uL    Neutrophils % 97.4 40.0 - 80.0 %    Immature Granulocytes %, Automated 2.1 (H) 0.0 - 0.9 %    Lymphocytes % 0.5 13.0 - 44.0 %    Monocytes % 0.0 2.0 - 10.0 %    Eosinophils % 0.0 0.0 - 6.0 %    Basophils % 0.0 0.0 - 2.0 %    Neutrophils Absolute 1.83 1.60 - 5.50 x10*3/uL    Immature Granulocytes Absolute, Automated 0.04 0.00 - 0.50 x10*3/uL    Lymphocytes Absolute 0.01 (L) 0.80 - 3.00 x10*3/uL    Monocytes Absolute 0.00 (L) 0.05 - 0.80 x10*3/uL    Eosinophils Absolute 0.00 0.00 - 0.40 x10*3/uL    Basophils Absolute 0.00 0.00 - 0.10 x10*3/uL    Hepatic Function Panel   Result Value Ref Range    Albumin 3.2 (L) 3.4 - 5.0 g/dL    Bilirubin, Total 0.5 0.0 - 1.2 mg/dL    Bilirubin, Direct 0.1 0.0 - 0.3 mg/dL    Alkaline Phosphatase 74 33 - 136 U/L    ALT 10 10 - 52 U/L    AST 12 9 - 39 U/L    Total Protein 5.0 (L) 6.4 - 8.2 g/dL   Phosphorus   Result Value Ref Range    Phosphorus 3.1 2.5 - 4.9 mg/dL   Basic Metabolic Panel   Result Value Ref Range    Glucose 99 74 - 99 mg/dL    Sodium 139 136 - 145 mmol/L    Potassium 3.5 3.5 - 5.3 mmol/L    Chloride 107 98 - 107 mmol/L    Bicarbonate 27 21 - 32 mmol/L    Anion Gap 9 (L) 10 - 20 mmol/L    Urea Nitrogen 14 6 - 23 mg/dL    Creatinine 0.78 0.50 - 1.30 mg/dL    eGFR >90 >60 mL/min/1.73m*2    Calcium 9.0 8.6 - 10.6 mg/dL   Morphology   Result Value Ref Range    RBC Morphology No significant RBC morphology present              This patient has a central line   Reason for the central line remaining today? Parenteral medication                 Assessment/Plan   Principal Problem:    IgG multiple myeloma (CMS/HCC)  Active Problems:    Kappa light chain myeloma (CMS/HCC)    David Ferreira is a 78 y.o. male with IgG [place] Myeloma in [place] admitted for his Autologous SCT with Sonja 140 (T=0, 2/07/24)     T+ 6  from Autologous SCT prepped with Melphalan 140mg/m2     ONC:  -Hx/o IgG Kappa Multiple Myeloma  -Initially evaluated for elevated protein  -Bmbx c/w 30-40% kappa restricted plasma cells. FISH neg.   -CT/PET neg  -B2M 3.8, IgG 6072, Kappa  mg/L, K/L ratio 589, normal Ca, Cr, LDH, alb  -3/2023 Started Velcade, Revlimid, and Dex  -Switched to Pratibha, Kyprolis, and Dex 5/2023 with slow response to therapy  - Now in [place]  - Autologous SCT (2/07/24) prepped with Melphalan 140mg/m2     HEME:  #Asymptomatic anemia and thrombocytopenia  - transfuse for hgb <7, plt <10  - Neupogen 2/12-->     ID:  #Prophylaxis: ACV, Fluconazole, Levofloxacin     FEN/GI:  - Admit wt: 73.9 kg current wt: 73.7 kg(2/12)  #MARINAD prophy:  Protonix  -Normal saline bolus 500cc 2/13 due to decreased appetite  -supplements ordered TID with meals     CV:  ECHO (11/7/23) with EF of 65%     MSK:  - Rt Rotator cuff tear  - Cont Capsaicin and oxycodone as needed     NEURO:  - Peripheral neuropathy B/l LE and 2/2 B/L carpal tunnel syndrome s/p repair.   - Cont Gabapentin and Cymbalta 30mg     DISPO;  -Full code  -Trialysis  -Primary oncologist: Dr. Contrersa     Patient seen examined and discussed with SARA BartonC

## 2024-02-13 NOTE — PROGRESS NOTES
Physical Therapy    Physical Therapy Treatment    Patient Name: David Ferreira  MRN: [id number]  Today's Date: 2/13/2024  Time Calculation  Start Time: 0956  Stop Time: 1022  Time Calculation (min): 26 min       Assessment/Plan   PT Assessment  End of Session Communication: Bedside nurse  End of Session Patient Position: Up in chair, Alarm off, not on at start of session     PT Plan  Treatment/Interventions: Bed mobility, Gait training, Transfer training, Stair training, Balance training, Neuromuscular re-education, Strengthening, Endurance training, Range of motion, Therapeutic exercise, Therapeutic activity, Home exercise program, Positioning, Postural re-education  PT Plan: Skilled PT  PT Frequency: 2 times per week  PT Discharge Recommendations: Low intensity level of continued care      General Visit Information:   PT  Visit  PT Received On: 02/13/24  General  Family/Caregiver Present: No  Prior to Session Communication: Bedside nurse  Patient Position Received: Bed, 3 rail up, Alarm off, not on at start of session (sitting on EOB)  General Comment: Pt is pleasant, cooperative, and willing to participate in therapy    Subjective   Precautions:  Precautions  Medical Precautions: Fall precautions (Protective precautions)  Precautions Comment: H/H: 8.7/26.2; Plts:47; WBC: 1.9  Vital Signs:       Objective   Pain:  Pain Assessment  Pain Assessment: 0-10  Pain Score: 0 - No pain  Cognition:  Cognition  Overall Cognitive Status: Within Functional Limits  Orientation Level: Oriented X4  Postural Control:  Static Sitting Balance  Static Sitting-Balance Support: No upper extremity supported  Static Sitting-Level of Assistance: Close supervision  Dynamic Sitting Balance  Dynamic Sitting-Balance Support: No upper extremity supported  Dynamic Sitting-Comments: SBA  Static Standing Balance  Static Standing-Balance Support: No upper extremity supported  Static Standing-Comment/Number of Minutes: SBA  Dynamic Standing  Balance  Dynamic Standing-Balance Support: No upper extremity supported  Dynamic Standing-Comments: SBA    Activity Tolerance:  Activity Tolerance  Endurance: Endurance does not limit participation in activity  Treatments:  Therapeutic Exercise  Therapeutic Exercise Performed: Yes  Therapeutic Exercise Activity 1: AP 1x15  Therapeutic Exercise Activity 2: LAQ 1x15  Therapeutic Exercise Activity 3: seated marches 2x20  Therapeutic Exercise Activity 4: hip add with pillow squeeze 1x10 (VCs for form, rest break due to fatigue)         Ambulation/Gait Training  Ambulation/Gait Training Performed: Yes  Ambulation/Gait Training 1  Surface 1: Level tile  Device 1: No device  Assistance 1: Minimal verbal cues (SBA)  Quality of Gait 1: Soft knee(s) (decreased sam, decreased foot clearance)  Comments/Distance (ft) 1: 15 ft, 5 ft  Transfers  Transfer: Yes  Transfer 1  Transfer From 1: Sit to, Stand to  Transfer to 1: Stand, Sit  Technique 1: Sit to stand, Stand to sit  Transfer Level of Assistance 1: Minimal verbal cues (SBA)  Trials/Comments 1: VCs, multiple trials during session, increased time to perform    Stairs  Stairs: No    Outcome Measures:  [facility] Basic Mobility  Turning from your back to your side while in a flat bed without using bedrails: A little  Moving from lying on your back to sitting on the side of a flat bed without using bedrails: A little  Moving to and from bed to chair (including a wheelchair): A little  Standing up from a chair using your arms (e.g. wheelchair or bedside chair): A little  To walk in hospital room: A little  Climbing 3-5 steps with railing: A little  Basic Mobility - Total Score: 18    Education Documentation  Precautions, taught by Maye Murphy PT at 2/13/2024 11:40 AM.  Learner: Patient  Readiness: Acceptance  Method: Explanation  Response: Verbalizes Understanding    Mobility Training, taught by Maye Murphy PT at 2/13/2024 11:40 AM.  Learner: Patient  Readiness:  Acceptance  Method: Explanation  Response: Verbalizes Understanding    Education Comments  No comments found.        OP EDUCATION:  Outpatient Education  Individual(s) Educated: Patient  Education Provided: Fall Risk, Home Exercise Program, POC (transfers, gait, activity and symptom monitoring, listening to body and taking breaks as needed)  Plan of Care Discussed and Agreed Upon: yes  Patient Response to Education: Patient/Caregiver Verbalized Understanding of Information    Encounter Problems       Encounter Problems (Active)       Balance       Pt will score a 25/28 or greater on the Tinetti balance assessment in order to demonstrate low fall risk.  (Progressing)       Start:  02/06/24    Expected End:  02/27/24               Mobility       STG - Patient will ambulate >800 ft independently with no LOB, progress as able and appropriate (Progressing)       Start:  02/06/24    Expected End:  02/27/24            Pt will ambulate 800 ft independently with no signs of fatigue or SOB  (Progressing)       Start:  02/06/24    Expected End:  02/27/24               Pain - Adult          Transfers       STG - Patient will perform bed mobility independently  (Progressing)       Start:  02/06/24    Expected End:  02/27/24            STG - Patient will transfer sit to and from stand independently  (Progressing)       Start:  02/06/24    Expected End:  02/27/24 02/13/24 at 11:42 AM - Maye Murphy, PT

## 2024-02-13 NOTE — CARE PLAN
VSS. Afebrile. No complaints of n/v. Complained of diarrhea, given PRN immodium x1. 500cc bolus given per orders. Pt remained free from falls and injury this shift.

## 2024-02-14 LAB
ALBUMIN SERPL BCP-MCNC: 3.1 G/DL (ref 3.4–5)
ALP SERPL-CCNC: 71 U/L (ref 33–136)
ALT SERPL W P-5'-P-CCNC: 8 U/L (ref 10–52)
ANION GAP SERPL CALC-SCNC: 8 MMOL/L (ref 10–20)
APPEARANCE UR: CLEAR
AST SERPL W P-5'-P-CCNC: 9 U/L (ref 9–39)
BACTERIA BPU CULT: NORMAL
BASOPHILS # BLD MANUAL: 0.01 X10*3/UL (ref 0–0.1)
BASOPHILS NFR BLD MANUAL: 12.5 %
BILIRUB DIRECT SERPL-MCNC: 0.1 MG/DL (ref 0–0.3)
BILIRUB SERPL-MCNC: 0.6 MG/DL (ref 0–1.2)
BILIRUB UR STRIP.AUTO-MCNC: NEGATIVE MG/DL
BUN SERPL-MCNC: 13 MG/DL (ref 6–23)
CALCIUM SERPL-MCNC: 8.9 MG/DL (ref 8.6–10.6)
CHLORIDE SERPL-SCNC: 107 MMOL/L (ref 98–107)
CO2 SERPL-SCNC: 26 MMOL/L (ref 21–32)
COLOR UR: NORMAL
CREAT SERPL-MCNC: 0.77 MG/DL (ref 0.5–1.3)
EGFRCR SERPLBLD CKD-EPI 2021: >90 ML/MIN/1.73M*2
EOSINOPHIL # BLD MANUAL: 0 X10*3/UL (ref 0–0.4)
EOSINOPHIL NFR BLD MANUAL: 0 %
ERYTHROCYTE [DISTWIDTH] IN BLOOD BY AUTOMATED COUNT: 13.8 % (ref 11.5–14.5)
GLUCOSE SERPL-MCNC: 101 MG/DL (ref 74–99)
GLUCOSE UR STRIP.AUTO-MCNC: NORMAL MG/DL
HCT VFR BLD AUTO: 24.8 % (ref 41–52)
HGB BLD-MCNC: 8.4 G/DL (ref 13.5–17.5)
IMM GRANULOCYTES # BLD AUTO: 0 X10*3/UL (ref 0–0.5)
IMM GRANULOCYTES NFR BLD AUTO: 0 % (ref 0–0.9)
KETONES UR STRIP.AUTO-MCNC: NEGATIVE MG/DL
LEUKOCYTE ESTERASE UR QL STRIP.AUTO: NEGATIVE
LYMPHOCYTES # BLD MANUAL: 0.05 X10*3/UL (ref 0.8–3)
LYMPHOCYTES NFR BLD MANUAL: 50 %
MAGNESIUM SERPL-MCNC: 1.84 MG/DL (ref 1.6–2.4)
MCH RBC QN AUTO: 35 PG (ref 26–34)
MCHC RBC AUTO-ENTMCNC: 33.9 G/DL (ref 32–36)
MCV RBC AUTO: 103 FL (ref 80–100)
MONOCYTES # BLD MANUAL: 0 X10*3/UL (ref 0.05–0.8)
MONOCYTES NFR BLD MANUAL: 0 %
MUCOUS THREADS #/AREA URNS AUTO: NORMAL /LPF
NEUTS SEG # BLD MANUAL: 0.04 X10*3/UL (ref 1.6–5)
NEUTS SEG NFR BLD MANUAL: 37.5 %
NITRITE UR QL STRIP.AUTO: NEGATIVE
NRBC BLD-RTO: 0 /100 WBCS (ref 0–0)
PH UR STRIP.AUTO: 5.5 [PH]
PHOSPHATE SERPL-MCNC: 2.9 MG/DL (ref 2.5–4.9)
PLATELET # BLD AUTO: 31 X10*3/UL (ref 150–450)
POTASSIUM SERPL-SCNC: 3.4 MMOL/L (ref 3.5–5.3)
PROT SERPL-MCNC: 4.9 G/DL (ref 6.4–8.2)
PROT UR STRIP.AUTO-MCNC: NORMAL MG/DL
RBC # BLD AUTO: 2.4 X10*6/UL (ref 4.5–5.9)
RBC # UR STRIP.AUTO: NEGATIVE /UL
RBC #/AREA URNS AUTO: NORMAL /HPF
RBC MORPH BLD: ABNORMAL
SODIUM SERPL-SCNC: 138 MMOL/L (ref 136–145)
SP GR UR STRIP.AUTO: 1.01
TOTAL CELLS COUNTED BLD: 8
URATE SERPL-MCNC: 4.4 MG/DL (ref 4–7.5)
UROBILINOGEN UR STRIP.AUTO-MCNC: NORMAL MG/DL
WBC # BLD AUTO: 0.1 X10*3/UL (ref 4.4–11.3)
WBC #/AREA URNS AUTO: NORMAL /HPF

## 2024-02-14 PROCEDURE — 2500000002 HC RX 250 W HCPCS SELF ADMINISTERED DRUGS (ALT 637 FOR MEDICARE OP, ALT 636 FOR OP/ED): Performed by: PHYSICIAN ASSISTANT

## 2024-02-14 PROCEDURE — 2500000004 HC RX 250 GENERAL PHARMACY W/ HCPCS (ALT 636 FOR OP/ED)

## 2024-02-14 PROCEDURE — 85007 BL SMEAR W/DIFF WBC COUNT: CPT | Performed by: PHYSICIAN ASSISTANT

## 2024-02-14 PROCEDURE — 85027 COMPLETE CBC AUTOMATED: CPT | Performed by: PHYSICIAN ASSISTANT

## 2024-02-14 PROCEDURE — 99233 SBSQ HOSP IP/OBS HIGH 50: CPT | Performed by: INTERNAL MEDICINE

## 2024-02-14 PROCEDURE — 81001 URINALYSIS AUTO W/SCOPE: CPT | Performed by: PHYSICIAN ASSISTANT

## 2024-02-14 PROCEDURE — 83735 ASSAY OF MAGNESIUM: CPT | Performed by: PHYSICIAN ASSISTANT

## 2024-02-14 PROCEDURE — 2500000001 HC RX 250 WO HCPCS SELF ADMINISTERED DRUGS (ALT 637 FOR MEDICARE OP)

## 2024-02-14 PROCEDURE — 2500000001 HC RX 250 WO HCPCS SELF ADMINISTERED DRUGS (ALT 637 FOR MEDICARE OP): Performed by: PHYSICIAN ASSISTANT

## 2024-02-14 PROCEDURE — 84550 ASSAY OF BLOOD/URIC ACID: CPT | Performed by: PHYSICIAN ASSISTANT

## 2024-02-14 PROCEDURE — 1170000001 HC PRIVATE ONCOLOGY ROOM DAILY

## 2024-02-14 PROCEDURE — 84100 ASSAY OF PHOSPHORUS: CPT | Performed by: PHYSICIAN ASSISTANT

## 2024-02-14 PROCEDURE — 2500000001 HC RX 250 WO HCPCS SELF ADMINISTERED DRUGS (ALT 637 FOR MEDICARE OP): Performed by: INTERNAL MEDICINE

## 2024-02-14 PROCEDURE — 82248 BILIRUBIN DIRECT: CPT | Performed by: PHYSICIAN ASSISTANT

## 2024-02-14 PROCEDURE — 80053 COMPREHEN METABOLIC PANEL: CPT | Performed by: PHYSICIAN ASSISTANT

## 2024-02-14 PROCEDURE — 2500000004 HC RX 250 GENERAL PHARMACY W/ HCPCS (ALT 636 FOR OP/ED): Mod: JZ | Performed by: INTERNAL MEDICINE

## 2024-02-14 RX ORDER — POTASSIUM CHLORIDE 29.8 MG/ML
40 INJECTION INTRAVENOUS ONCE
Status: COMPLETED | OUTPATIENT
Start: 2024-02-14 | End: 2024-02-14

## 2024-02-14 RX ADMIN — DULOXETINE HYDROCHLORIDE 30 MG: 30 CAPSULE, DELAYED RELEASE ORAL at 21:53

## 2024-02-14 RX ADMIN — FLUCONAZOLE 400 MG: 200 TABLET ORAL at 08:35

## 2024-02-14 RX ADMIN — FILGRASTIM-SNDZ 480 MCG: 480 INJECTION, SOLUTION INTRAVENOUS; SUBCUTANEOUS at 14:14

## 2024-02-14 RX ADMIN — PROCHLORPERAZINE MALEATE 10 MG: 10 TABLET ORAL at 02:16

## 2024-02-14 RX ADMIN — ACYCLOVIR 400 MG: 400 TABLET ORAL at 21:52

## 2024-02-14 RX ADMIN — PANTOPRAZOLE SODIUM 40 MG: 40 TABLET, DELAYED RELEASE ORAL at 06:13

## 2024-02-14 RX ADMIN — POTASSIUM CHLORIDE 40 MEQ: 29.8 INJECTION, SOLUTION INTRAVENOUS at 06:13

## 2024-02-14 RX ADMIN — GABAPENTIN 300 MG: 300 CAPSULE ORAL at 14:14

## 2024-02-14 RX ADMIN — LEVOFLOXACIN 500 MG: 500 TABLET, FILM COATED ORAL at 08:35

## 2024-02-14 RX ADMIN — LOPERAMIDE HYDROCHLORIDE 2 MG: 2 CAPSULE ORAL at 08:35

## 2024-02-14 RX ADMIN — PROCHLORPERAZINE MALEATE 10 MG: 10 TABLET ORAL at 08:35

## 2024-02-14 RX ADMIN — GABAPENTIN 600 MG: 300 CAPSULE ORAL at 21:52

## 2024-02-14 RX ADMIN — Medication 10 MG: at 21:53

## 2024-02-14 RX ADMIN — GABAPENTIN 300 MG: 300 CAPSULE ORAL at 08:35

## 2024-02-14 RX ADMIN — ACYCLOVIR 400 MG: 400 TABLET ORAL at 08:35

## 2024-02-14 ASSESSMENT — COGNITIVE AND FUNCTIONAL STATUS - GENERAL
CLIMB 3 TO 5 STEPS WITH RAILING: A LITTLE
MOBILITY SCORE: 23
DAILY ACTIVITIY SCORE: 24

## 2024-02-14 ASSESSMENT — PAIN SCALES - GENERAL
PAINLEVEL_OUTOF10: 0 - NO PAIN
PAINLEVEL_OUTOF10: 1

## 2024-02-14 ASSESSMENT — PAIN - FUNCTIONAL ASSESSMENT: PAIN_FUNCTIONAL_ASSESSMENT: 0-10

## 2024-02-14 ASSESSMENT — PAIN DESCRIPTION - DESCRIPTORS: DESCRIPTORS: ACHING

## 2024-02-14 NOTE — CARE PLAN
The patient's goals for the shift include      The clinical goals for the shift include Patient will report improvement of GI symptoms    VSS, afebrile. Oral compazine given before breakfast to prevent nausea. Imodium given x1. Dressing changed on R Trialysis performed d/t oozing blood. Declined oxycodone and capsaicin this shift. No other needs at the time of this note.

## 2024-02-14 NOTE — PROGRESS NOTES
David Ferreira is a 78 y.o. male on day 9 of admission presenting with IgG multiple myeloma (CMS/HCC).    Subjective     Afebrile, no overnight events.      This morning (02/14/24), he reports that he's doing much better than yesterday. Nausea controlled with Compazine PRN, no vomiting. Denies chest pain, dyspnea, cough, and productive cough. No dizziness with standing -- he's very careful when he stands.    Denies fevers and chills. He's been drinking more water, urinating okay.     He does mention that he has just a little bit of pain with urination; no hematuria. He does not have a sensation of incomplete voiding.     He has not had any further diarrhea.     No rashes. He endorses a little swelling in R hand, but not in the arm. He notes his torn rotator cuff.     Denies much in the way of further hiccups. Denies mouth sores.     Objective     Physical Exam  Constitutional:       General: He is not in acute distress.  HENT:      Head: Normocephalic and atraumatic.      Mouth/Throat:      Mouth: Mucous membranes are moist.      Pharynx: No oropharyngeal exudate or posterior oropharyngeal erythema.        Comments: Area, about 1/2 dime-sized of purplish discoloration on the tip of the tongue; per patient this is chronic, intermittent.  Eyes:      Extraocular Movements: Extraocular movements intact.      Pupils: Pupils are equal, round, and reactive to light.   Cardiovascular:      Rate and Rhythm: Normal rate and regular rhythm.      Heart sounds: No murmur heard.     No friction rub. No gallop.   Pulmonary:      Effort: No respiratory distress.      Breath sounds: Normal breath sounds. No wheezing or rhonchi.   Abdominal:      General: Bowel sounds are normal.      Palpations: Abdomen is soft.      Tenderness: There is no abdominal tenderness.   Musculoskeletal:         General: Normal range of motion.      Right lower leg: No edema.      Left lower leg: No edema.   Skin:     General: Skin is warm and dry.    Neurological:      Mental Status: He is alert and oriented to person, place, and time.      Motor: No weakness.   Psychiatric:         Mood and Affect: Mood normal.         Behavior: Behavior normal.       Assessment/Plan   Principal Problem:    IgG multiple myeloma (CMS/HCC)  Active Problems:    Kappa light chain myeloma (CMS/HCC)    David Ferreira is a 78 y.o. male with IgG [place] Myeloma in [place] admitted for his Autologous SCT with Sonja 140 (T=0, 2/07/24)     T+7 (02/14/24)  from Autologous SCT prepped with Melphalan 140mg/m2    Active issues today (02/14/24):  # Dysuria, mild. Sending UA.   # Chemotherapy induced nausea. Controlled with prochlorperazine PRN.      ONC  # IgG Kappa Multiple Myeloma  - Currently in [place]  - Autologous SCT (2/07/24) prepped with Melphalan 140mg/m2     HEME  # Pancytopenia  :: Due to disease and chemotherapy   - Transfuse for Hgb < 7 g/dL, platelets < 10 k/uL  - Neupogen (2/12- )     ID  # Dysuria, mild (2/14/24)  - No signs or symptoms of sepsis, pyelonephritis  - UA (2/14/24): pending  # Prophylaxis  - ACV, Fluconazole, Levofloxacin     FEN/GI  - Admit wt: 73.9 kg  Current wt: 73.8  kg (02/14/24)  # Chemotherapy induced nausea  - Continue prochlorperazine PRN  # PUD prophy: Protonix  # Protein malnutrition  - supplements ordered TID with meals     CV  # Functional surveillance  - ECHO (11/7/23) with EF of 65%     MSK  # R Rotator cuff tear  - Cont Capsaicin and oxycodone as needed     NEURO:  # 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 Contreras     Patient seen examined and discussed with Dr. Tani Ramirez PA-C

## 2024-02-14 NOTE — CARE PLAN
The patient's goals for the shift include    Problem: Fall/Injury  Goal: Not fall by end of shift  Outcome: Progressing  Goal: Be free from injury by end of the shift  Outcome: Progressing  Goal: Verbalize understanding of personal risk factors for fall in the hospital  Outcome: Progressing  Goal: Verbalize understanding of risk factor reduction measures to prevent injury from fall in the home  Outcome: Progressing  Goal: Use assistive devices by end of the shift  Outcome: Progressing  Goal: Pace activities to prevent fatigue by end of the shift  Outcome: Progressing     Problem: Pain - Adult  Goal: Verbalizes/displays adequate comfort level or baseline comfort level  Outcome: Progressing     Problem: Safety - Adult  Goal: Free from fall injury  Outcome: Progressing     Problem: Discharge Planning  Goal: Discharge to home or other facility with appropriate resources  Outcome: Progressing     Problem: Chronic Conditions and Co-morbidities  Goal: Patient's chronic conditions and co-morbidity symptoms are monitored and maintained or improved  Outcome: Progressing       The clinical goals for the shift include pt will remain safe and free from injuries

## 2024-02-14 NOTE — PROGRESS NOTES
"Nutrition Follow Up Assessment  Nutrition Assessment      Today is T+7 s/p Auto Transplant (T=0 on 02/07/24).    Nutrition History:  This service met with pt earlier today, was sitting up in bed at time of visit with him.    Tells the last couple days haven't been too good with c/o intermittent nausea, dry heaves, diarrhea, and hiccups. Also with c/o dysgeusia. Has not been able to eat a whole lot at one time d/t above issues, though has been trying to eat smaller amounts throughout the day. Has snacks in his room his family brought in for him, things like crackers, tangerines, and candies he likes to eat throughout the day. His daughter also brought in some chili yesterday he ate for lunch meal, \"it was very good.\"     Team placed order for Ensure Plus TID yesterday. Tells he drank 1 whole carton last pm, \"it didn't taste too bad at all.\"     Today, reports he is feeling much better, nausea is controlled with anti-emetics, has been taking Imodium for diarrhea. Taste starting to come back. No longer having hiccups. No solid PO as of yet today but does plan on trying to eat later, \"my wife and daughter are coming in again, they're bringing me a fish sandwich.\" Has been drinking water this am + also walked 3 laps in the hallway so far today. \"I think I'm doing better today than what I have been.\"    Anthropometrics:  Height: 165.1 cm (5' 5\")   Weight: 73.8 kg (162 lb 11.2 oz)   BMI (Calculated): 27.07  IBW/kg (Dietitian Calculated): 61.8 kg  Percent of IBW: 119 %       Admission Weight Trend:  02/05-73.8kg (admit wt)  02/08-76.6kg (wt at last nutrition visit)  02/14-73.8kg (current wt)--wt appears stable, when compared to admit wt    Nutrition Focused Physical Exam Findings:  defer: completed at last nutrition visit with pt  Edema:  Edema: +2 mild  Edema Location: RUE  Physical Findings:  Skin: Negative    Nutrition Significant Labs:  CBC Trend:   Results from last 7 days   Lab Units 02/14/24  0215 02/13/24  0151 " 02/12/24  0115 02/11/24  0558   WBC AUTO x10*3/uL 0.1* 1.9* 1.7* 2.9*   RBC AUTO x10*6/uL 2.40* 2.51* 2.53* 2.63*   HEMOGLOBIN g/dL 8.4* 8.7* 8.9* 9.2*   HEMATOCRIT % 24.8* 26.2* 26.7* 27.7*   MCV fL 103* 104* 106* 105*   PLATELETS AUTO x10*3/uL 31* 47* 73* 85*   BMP Trend:   Results from last 7 days   Lab Units 02/14/24 0215 02/13/24 0151 02/12/24 0115 02/11/24  0558   GLUCOSE mg/dL 101* 99 98 105*   CALCIUM mg/dL 8.9 9.0 9.2 9.3   SODIUM mmol/L 138 139 138 140   POTASSIUM mmol/L 3.4* 3.5 3.6 3.8   CO2 mmol/L 26 27 27 26   CHLORIDE mmol/L 107 107 107 109*   BUN mg/dL 13 14 18 20   CREATININE mg/dL 0.77 0.78 0.69 0.74   Liver Function Trend:   Results from last 7 days   Lab Units 02/14/24 0215 02/13/24 0151 02/12/24 0115 02/11/24  0558   ALK PHOS U/L 71 74 73 77   AST U/L 9 12 12 12   ALT U/L 8* 10 10 11   BILIRUBIN TOTAL mg/dL 0.6 0.5 0.5 0.5   Renal Lab Trend:   Results from last 7 days   Lab Units 02/14/24 0215 02/13/24 0151 02/12/24 0115 02/11/24  0558   POTASSIUM mmol/L 3.4* 3.5 3.6 3.8   PHOSPHORUS mg/dL 2.9 3.1 3.0 2.9   SODIUM mmol/L 138 139 138 140   MAGNESIUM mg/dL 1.84 1.91 1.94 2.04   EGFR mL/min/1.73m*2 >90 >90 >90 >90   BUN mg/dL 13 14 18 20   CREATININE mg/dL 0.77 0.78 0.69 0.74      Medications:  Scheduled medications  acyclovir, 400 mg, oral, q12h  DULoxetine, 30 mg, oral, Nightly  filgrastim or biosimilar, 480 mcg, subcutaneous, q24h  fluconazole, 400 mg, oral, Daily  gabapentin, 300 mg, oral, BID  gabapentin, 600 mg, oral, Nightly  levoFLOXacin, 500 mg, oral, Daily  melatonin, 10 mg, oral, Nightly  pantoprazole, 40 mg, oral, Daily before breakfast    PRN medications  PRN medications: alteplase, capsaicin, loperamide, oxyCODONE, prochlorperazine, prochlorperazine    I/O:   Last BM Date: 02/13/24    Dietary Orders (From admission, onward)       Start     Ordered    02/14/24 1029  Oral nutritional supplements  Until discontinued        Comments: please send both chocolate and vanilla Ensure  Plus--2 times/day   Question Answer Comment   Deliver with Breakfast    Deliver with Dinner    Select supplement: Ensure Plus        02/14/24 1029    02/08/24 1106  Adult diet Low pathogen  Diet effective now        Comments: pt may order from Extended Stay Menu + UserTesting Snack List, if requested   Question:  Diet type  Answer:  Low pathogen    02/08/24 1105    02/05/24 1654  May Participate in Room Service  Once        Question:  .  Answer:  Yes    02/05/24 1654                Estimated Needs:   Total Energy Estimated Needs (kCal): 6698-8108  Method for Estimating Needs: MSJ (WCW=0796) x ~1.3-1.4  Total Protein Estimated Needs (g): 80+  Method for Estimating Needs: 62 (IBW) x ~1.3g/kg+  Total Fluid Estimated Needs (mL): per MD/team        Nutrition Diagnosis   Malnutrition Diagnosis  Patient has Malnutrition Diagnosis: No    Nutrition Diagnosis  Patient has Nutrition Diagnosis: Yes  Diagnosis Status (1): Ongoing  Nutrition Diagnosis 1: Increased nutrient needs  Related to (1): increased metabolic demand  As Evidenced by (1): pt with Myeloma s/p recent transplant    Additional Nutrition Diagnosis: Diagnosis 2  Diagnosis Status (2): New  Nutrition Diagnosis 2: Inadequate oral intake  Related to (2): decreased PO with treatment related side effects  As Evidenced by (2): pt report of declining PO from baseline x the last several days, only consuming smaller amounts of food at a time, need for oral nutrition supplements    Additional Assessment Information: Do not feel malnutrition present at this time, though is at risk for malnutrition, pending ongoing PO intakes in-house. Do feel pt continues to benefit from use of oral nutrition supplements, agreeable to continue Ensure Plus, though only wants it BID--order adjusted. Did also send extra bottles of water + Gatorade to pt's room after my visit, per his request.       Nutrition Interventions/Recommendations     1. Continue Low Pathogen Diet, only as tolerated  --RDN to  continue order for Ensure Plus, though adjusted order to BID v TID, per pt's request        Nutrition Prescription for Oral Nutrition: 1800kcals/day, 80+g pro/day        Nutrition Interventions:   Food and/or Nutrient Delivery Interventions  Interventions: Meals and snacks, Medical food supplement  Meals and Snacks: General healthful diet  Goal: Low Pathogen Diet  Medical Food Supplement: Commercial beverage  Goal: Ensure Plus BID (350kcals, 13g pro each)    Nutrition Education: Encouraged ongoing PO, even if only able to take in smaller amounts of food at a time. Pt agreeable. Denied any particular questions for RDN at this time.       Nutrition Monitoring and Evaluation   Food/Nutrient Related History Monitoring  Monitoring and Evaluation Plan: Energy intake  Energy Intake: Estimated energy intake  Criteria: consume 50% or greater of 2-3 meals/day + 100% of at least 1-2 cartons of Ensure Plus/day    Body Composition/Growth/Weight History  Monitoring and Evaluation Plan: Weight  Weight: Measured weight  Criteria: daily wts; maintain stable wt in-house    Biochemical Data, Medical Tests and Procedures  Monitoring and Evaluation Plan: Electrolyte/renal panel  Electrolyte and Renal Panel: Magnesium, Phosphorus, Potassium, Sodium  Criteria: lytes WNL    Time Spent/Follow-up Reminder:   Time Spent (min): 30 minutes  Last Date of Nutrition Visit: 02/14/24  Nutrition Follow-Up Needed?: Dietitian to reassess per policy  Follow up Comment: PO diet

## 2024-02-15 LAB
ALBUMIN SERPL BCP-MCNC: 3.1 G/DL (ref 3.4–5)
ALP SERPL-CCNC: 68 U/L (ref 33–136)
ALT SERPL W P-5'-P-CCNC: 7 U/L (ref 10–52)
ANION GAP SERPL CALC-SCNC: <7 MMOL/L (ref 10–20)
AST SERPL W P-5'-P-CCNC: 9 U/L (ref 9–39)
BASOPHILS # BLD AUTO: 0 X10*3/UL (ref 0–0.1)
BASOPHILS NFR BLD AUTO: 0 %
BILIRUB DIRECT SERPL-MCNC: 0.1 MG/DL (ref 0–0.3)
BILIRUB SERPL-MCNC: 0.6 MG/DL (ref 0–1.2)
BUN SERPL-MCNC: 12 MG/DL (ref 6–23)
CALCIUM SERPL-MCNC: 8.9 MG/DL (ref 8.6–10.6)
CHLORIDE SERPL-SCNC: 108 MMOL/L (ref 98–107)
CO2 SERPL-SCNC: 28 MMOL/L (ref 21–32)
CREAT SERPL-MCNC: 0.74 MG/DL (ref 0.5–1.3)
EGFRCR SERPLBLD CKD-EPI 2021: >90 ML/MIN/1.73M*2
EOSINOPHIL # BLD AUTO: 0 X10*3/UL (ref 0–0.4)
EOSINOPHIL NFR BLD AUTO: 0 %
ERYTHROCYTE [DISTWIDTH] IN BLOOD BY AUTOMATED COUNT: 14.1 % (ref 11.5–14.5)
GLUCOSE SERPL-MCNC: 103 MG/DL (ref 74–99)
HCT VFR BLD AUTO: 25 % (ref 41–52)
HGB BLD-MCNC: 8.3 G/DL (ref 13.5–17.5)
IMM GRANULOCYTES # BLD AUTO: 0 X10*3/UL (ref 0–0.5)
IMM GRANULOCYTES NFR BLD AUTO: 0 % (ref 0–0.9)
LYMPHOCYTES # BLD AUTO: 0.01 X10*3/UL (ref 0.8–3)
LYMPHOCYTES NFR BLD AUTO: 50 %
MAGNESIUM SERPL-MCNC: 1.79 MG/DL (ref 1.6–2.4)
MCH RBC QN AUTO: 34.7 PG (ref 26–34)
MCHC RBC AUTO-ENTMCNC: 33.2 G/DL (ref 32–36)
MCV RBC AUTO: 105 FL (ref 80–100)
MONOCYTES # BLD AUTO: 0 X10*3/UL (ref 0.05–0.8)
MONOCYTES NFR BLD AUTO: 0 %
NEUTROPHILS # BLD AUTO: 0.01 X10*3/UL (ref 1.6–5.5)
NEUTROPHILS NFR BLD AUTO: 50 %
NRBC BLD-RTO: 0 /100 WBCS (ref 0–0)
PHOSPHATE SERPL-MCNC: 3.1 MG/DL (ref 2.5–4.9)
PLATELET # BLD AUTO: 19 X10*3/UL (ref 150–450)
POTASSIUM SERPL-SCNC: 3.4 MMOL/L (ref 3.5–5.3)
PROT SERPL-MCNC: 5.1 G/DL (ref 6.4–8.2)
RBC # BLD AUTO: 2.39 X10*6/UL (ref 4.5–5.9)
SODIUM SERPL-SCNC: 139 MMOL/L (ref 136–145)
URATE SERPL-MCNC: 4 MG/DL (ref 4–7.5)
WBC # BLD AUTO: <0.1 X10*3/UL (ref 4.4–11.3)

## 2024-02-15 PROCEDURE — 1170000001 HC PRIVATE ONCOLOGY ROOM DAILY

## 2024-02-15 PROCEDURE — 84100 ASSAY OF PHOSPHORUS: CPT | Performed by: PHYSICIAN ASSISTANT

## 2024-02-15 PROCEDURE — 82248 BILIRUBIN DIRECT: CPT | Performed by: PHYSICIAN ASSISTANT

## 2024-02-15 PROCEDURE — 85025 COMPLETE CBC W/AUTO DIFF WBC: CPT | Performed by: PHYSICIAN ASSISTANT

## 2024-02-15 PROCEDURE — 2500000002 HC RX 250 W HCPCS SELF ADMINISTERED DRUGS (ALT 637 FOR MEDICARE OP, ALT 636 FOR OP/ED): Performed by: PHYSICIAN ASSISTANT

## 2024-02-15 PROCEDURE — 2500000005 HC RX 250 GENERAL PHARMACY W/O HCPCS: Performed by: PHYSICIAN ASSISTANT

## 2024-02-15 PROCEDURE — 84550 ASSAY OF BLOOD/URIC ACID: CPT | Performed by: PHYSICIAN ASSISTANT

## 2024-02-15 PROCEDURE — 2500000001 HC RX 250 WO HCPCS SELF ADMINISTERED DRUGS (ALT 637 FOR MEDICARE OP): Performed by: PHYSICIAN ASSISTANT

## 2024-02-15 PROCEDURE — 99233 SBSQ HOSP IP/OBS HIGH 50: CPT | Performed by: INTERNAL MEDICINE

## 2024-02-15 PROCEDURE — 2500000004 HC RX 250 GENERAL PHARMACY W/ HCPCS (ALT 636 FOR OP/ED)

## 2024-02-15 PROCEDURE — 2500000004 HC RX 250 GENERAL PHARMACY W/ HCPCS (ALT 636 FOR OP/ED): Mod: JZ | Performed by: INTERNAL MEDICINE

## 2024-02-15 PROCEDURE — 2500000001 HC RX 250 WO HCPCS SELF ADMINISTERED DRUGS (ALT 637 FOR MEDICARE OP): Performed by: INTERNAL MEDICINE

## 2024-02-15 PROCEDURE — 83735 ASSAY OF MAGNESIUM: CPT | Performed by: PHYSICIAN ASSISTANT

## 2024-02-15 RX ORDER — POTASSIUM CHLORIDE 29.8 MG/ML
40 INJECTION INTRAVENOUS ONCE
Status: COMPLETED | OUTPATIENT
Start: 2024-02-15 | End: 2024-02-15

## 2024-02-15 RX ORDER — LEVOFLOXACIN 500 MG/1
500 TABLET, FILM COATED ORAL
Status: DISCONTINUED | OUTPATIENT
Start: 2024-02-16 | End: 2024-02-16

## 2024-02-15 RX ORDER — ONDANSETRON HYDROCHLORIDE 8 MG/1
8 TABLET, FILM COATED ORAL EVERY 8 HOURS
Status: DISCONTINUED | OUTPATIENT
Start: 2024-02-15 | End: 2024-02-16

## 2024-02-15 RX ADMIN — PANTOPRAZOLE SODIUM 40 MG: 40 TABLET, DELAYED RELEASE ORAL at 06:13

## 2024-02-15 RX ADMIN — DULOXETINE HYDROCHLORIDE 30 MG: 30 CAPSULE, DELAYED RELEASE ORAL at 20:48

## 2024-02-15 RX ADMIN — GABAPENTIN 300 MG: 300 CAPSULE ORAL at 14:44

## 2024-02-15 RX ADMIN — ACYCLOVIR 400 MG: 400 TABLET ORAL at 08:16

## 2024-02-15 RX ADMIN — ONDANSETRON HYDROCHLORIDE 8 MG: 8 TABLET, FILM COATED ORAL at 12:06

## 2024-02-15 RX ADMIN — FLUCONAZOLE 400 MG: 200 TABLET ORAL at 08:15

## 2024-02-15 RX ADMIN — ACYCLOVIR 400 MG: 400 TABLET ORAL at 20:48

## 2024-02-15 RX ADMIN — POTASSIUM CHLORIDE 40 MEQ: 29.8 INJECTION, SOLUTION INTRAVENOUS at 06:13

## 2024-02-15 RX ADMIN — ONDANSETRON HYDROCHLORIDE 8 MG: 8 TABLET, FILM COATED ORAL at 19:37

## 2024-02-15 RX ADMIN — FILGRASTIM-SNDZ 480 MCG: 480 INJECTION, SOLUTION INTRAVENOUS; SUBCUTANEOUS at 14:44

## 2024-02-15 RX ADMIN — GABAPENTIN 300 MG: 300 CAPSULE ORAL at 08:15

## 2024-02-15 RX ADMIN — PROCHLORPERAZINE MALEATE 10 MG: 10 TABLET ORAL at 06:21

## 2024-02-15 RX ADMIN — GABAPENTIN 600 MG: 300 CAPSULE ORAL at 20:48

## 2024-02-15 RX ADMIN — Medication 10 MG: at 20:48

## 2024-02-15 RX ADMIN — LEVOFLOXACIN 500 MG: 500 TABLET, FILM COATED ORAL at 08:16

## 2024-02-15 ASSESSMENT — PAIN SCALES - GENERAL
PAINLEVEL_OUTOF10: 0 - NO PAIN
PAINLEVEL_OUTOF10: 0 - NO PAIN

## 2024-02-15 ASSESSMENT — COGNITIVE AND FUNCTIONAL STATUS - GENERAL
MOBILITY SCORE: 24
DAILY ACTIVITIY SCORE: 24
MOBILITY SCORE: 24
DAILY ACTIVITIY SCORE: 24

## 2024-02-15 NOTE — CARE PLAN
The patient's goals for the shift include    Problem: Fall/Injury  Goal: Not fall by end of shift  Outcome: Progressing  Goal: Be free from injury by end of the shift  Outcome: Progressing  Goal: Verbalize understanding of personal risk factors for fall in the hospital  Outcome: Progressing  Goal: Verbalize understanding of risk factor reduction measures to prevent injury from fall in the home  Outcome: Progressing  Goal: Use assistive devices by end of the shift  Outcome: Progressing  Goal: Pace activities to prevent fatigue by end of the shift  Outcome: Progressing     Problem: Pain - Adult  Goal: Verbalizes/displays adequate comfort level or baseline comfort level  Outcome: Progressing     Problem: Safety - Adult  Goal: Free from fall injury  Outcome: Progressing     Problem: Discharge Planning  Goal: Discharge to home or other facility with appropriate resources  Outcome: Progressing     Problem: Chronic Conditions and Co-morbidities  Goal: Patient's chronic conditions and co-morbidity symptoms are monitored and maintained or improved  Outcome: Progressing       The clinical goals for the shift include pt has improvement nausea symptoms throughout shift.

## 2024-02-15 NOTE — PROGRESS NOTES
"David Ferreira is a 78 y.o. male on day 10 of admission presenting with IgG multiple myeloma (CMS/HCC).    Subjective     Afebrile, no overnight events.      This morning (02/15/24), he reports that he did vomit once this morning. Before that, he was not nauseated, and he did take Compazine about a hour ahead of time.     At this point, though, he feels okay.     He denies diarrhea. He says he really hasn't eaten too much. He says \"I can't eat this food up here.\" He's pushing water -- drinking more water, he says. He says that his mild dysuria is improved with increased water intake.    He says that the mild swelling in his R hand is stable, certainly no worse.     Objective     Physical Exam  Constitutional:       General: He is not in acute distress.  HENT:      Head: Normocephalic and atraumatic.      Mouth/Throat:      Mouth: Mucous membranes are moist.      Pharynx: No oropharyngeal exudate or posterior oropharyngeal erythema.        Comments: Area, about 1/2 dime-sized of purplish discoloration on the tip of the tongue; per patient this is chronic, intermittent. There are other purplish, non-ulcerated lesions of the same size, one on the L interior angle and another on the R buccal mucosa, without evidence of bleeding.    Eyes:      Extraocular Movements: Extraocular movements intact.      Pupils: Pupils are equal, round, and reactive to light.   Cardiovascular:      Rate and Rhythm: Normal rate and regular rhythm.      Heart sounds: No murmur heard.     No friction rub. No gallop.   Pulmonary:      Effort: No respiratory distress.      Breath sounds: Normal breath sounds. No wheezing or rhonchi.   Abdominal:      General: Bowel sounds are normal.      Palpations: Abdomen is soft.      Tenderness: There is no abdominal tenderness.   Musculoskeletal:         General: Normal range of motion.      Right hand: Swelling (trace non-pittting edema of the R hand) present.      Right lower leg: No edema.      Left " lower leg: No edema.   Skin:     General: Skin is warm and dry.   Neurological:      Mental Status: He is alert and oriented to person, place, and time.      Motor: No weakness.   Psychiatric:         Mood and Affect: Mood normal.         Behavior: Behavior normal.       Assessment/Plan   Principal Problem:    IgG multiple myeloma (CMS/HCC)  Active Problems:    Kappa light chain myeloma (CMS/HCC)    David Ferreira is a 78 y.o. male with IgG [place] Myeloma in [place] admitted for his Autologous SCT with Sonja 140 (T=0, 2/07/24)     T+8 (02/15/24)  from Autologous SCT prepped with Melphalan 140mg/m2    Active issues today (02/15/24):  # Chemotherapy induced nausea, worsening. Added scheduled ondansetron to prochlorperazine PRN.      ONC  # IgG Kappa Multiple Myeloma  - Currently in [place]  - Autologous SCT (2/07/24) prepped with Melphalan 140mg/m2     HEME  # Pancytopenia  :: Due to disease and chemotherapy   - Transfuse for Hgb < 7 g/dL, platelets < 10 k/uL  - Neupogen (2/12- )     ID  # Dysuria, mild (2/14/24)  - No signs or symptoms of sepsis, pyelonephritis  - UA (2/14/24): no signs or symptoms of infection  # Prophylaxis  - ACV, Fluconazole, Levofloxacin     FEN/GI  - Admit wt: 73.9 kg  Current wt: 74  kg (02/15/24)  # Chemotherapy induced nausea and vomiting  - Start ondansetron 8 mg PO TID (2/15- )  - Continue prochlorperazine PRN  - Could consider olanzapine 2.5 mg at bedtime if not resolved later  # PUD prophy: Protonix  # Protein malnutrition  - supplements ordered TID with meals     CV  # Functional surveillance  - ECHO (11/7/23) with EF of 65%     MSK  # R Rotator cuff tear  - Continue Capsaicin and oxycodone as needed     NEURO:  # Peripheral neuropathy B/l 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 Contreras     Patient seen examined and discussed with Dr. Tani Ramirez,  CHUNG

## 2024-02-15 NOTE — CARE PLAN
Problem: Fall/Injury  Goal: Not fall by end of shift  Outcome: Progressing  Goal: Be free from injury by end of the shift  Outcome: Progressing  Goal: Verbalize understanding of personal risk factors for fall in the hospital  Outcome: Progressing  Goal: Verbalize understanding of risk factor reduction measures to prevent injury from fall in the home  Outcome: Progressing  Goal: Use assistive devices by end of the shift  Outcome: Progressing  Goal: Pace activities to prevent fatigue by end of the shift  Outcome: Progressing     Problem: Pain - Adult  Goal: Verbalizes/displays adequate comfort level or baseline comfort level  Outcome: Progressing     Problem: Safety - Adult  Goal: Free from fall injury  Outcome: Progressing     Problem: Discharge Planning  Goal: Discharge to home or other facility with appropriate resources  Outcome: Progressing     Problem: Chronic Conditions and Co-morbidities  Goal: Patient's chronic conditions and co-morbidity symptoms are monitored and maintained or improved  Outcome: Progressing   The patient's goals for the shift include      The clinical goals for the shift include Patient will report improvement of GI symptoms

## 2024-02-15 NOTE — PROGRESS NOTES
--->Pt stopped RDN while walking the hallways earlier this am.     Pt feels sending Ensure Plus BID is too many times/day, cannot drink that may. Requests order to be cut back to once/day--ordered adjusted by RDN.    Will continue to follow in-house.    --Time Spent: 15 minutes  --Dietitian to reassess per policy

## 2024-02-15 NOTE — PROGRESS NOTES
Art Therapy Note    David eFrreira     Therapy Session  Referral Type: New referral this admission  Visit Type: New visit  Session Start Time: 1518  Session End Time: 1527  Intervention Delivery: In-person  Conflict of Service: Declined treatment  Number of family members present: 0  Family Present for Session: None  Number of staff members present: 0              Treatment/Interventions  Art Therapy Interventions: Assessment    Post-assessment  Total Session Time (min): 9 minutes    Narrative  Assessment Detail: ATR made visit to Pt.'s room to follow up on a referral made, introduce, educate and assess AT needs and wants.  Pt lying in bed on the phone but motioned for ATR to come in.  Intervention: Provided active listening, presence, AT education and validation of feelings.  Evaluation: ATR and Pt engaged in about art therapy and it's benefits while in the hospital.  Pt graciously declined stating he never really did any art except when in school when it was required.  He expressed appreciation fo the offer but preferred to just have a conversation.  Pt was very pleasant and seemed to enjoy the company of ATR.  He discussed eagerly awaiting to go home and how much he missed being home, his time in the hospital, and society.  He was upset with trying to find a house and how over priced they are considering the the state of condition they are in,  He and his wife are trying to buy but are currently renting becasue of the latter.  Pt did say if he chaged his mind about wnating AT services he woudl ask his RN in connecting him wiht ATR,  Follow-up: No follow up up is needed per Pt request.    Education Documentation  No documentation found.

## 2024-02-16 ENCOUNTER — APPOINTMENT (OUTPATIENT)
Dept: CARDIOLOGY | Facility: HOSPITAL | Age: 79
End: 2024-02-16
Payer: MEDICARE

## 2024-02-16 PROBLEM — C90.00: Status: RESOLVED | Noted: 2024-02-05 | Resolved: 2024-02-16

## 2024-02-16 PROBLEM — T45.1X5A CHEMOTHERAPY INDUCED NAUSEA AND VOMITING: Status: ACTIVE | Noted: 2024-02-16

## 2024-02-16 PROBLEM — R11.2 CHEMOTHERAPY INDUCED NAUSEA AND VOMITING: Status: ACTIVE | Noted: 2024-02-16

## 2024-02-16 LAB
ABO GROUP (TYPE) IN BLOOD: NORMAL
ALBUMIN SERPL BCP-MCNC: 2.9 G/DL (ref 3.4–5)
ALP SERPL-CCNC: 69 U/L (ref 33–136)
ALT SERPL W P-5'-P-CCNC: 8 U/L (ref 10–52)
ANION GAP SERPL CALC-SCNC: 11 MMOL/L (ref 10–20)
ANTIBODY SCREEN: NORMAL
AST SERPL W P-5'-P-CCNC: 8 U/L (ref 9–39)
BASOPHILS # BLD AUTO: 0 X10*3/UL (ref 0–0.1)
BASOPHILS NFR BLD AUTO: 0 %
BILIRUB DIRECT SERPL-MCNC: 0.1 MG/DL (ref 0–0.3)
BILIRUB SERPL-MCNC: 0.7 MG/DL (ref 0–1.2)
BUN SERPL-MCNC: 11 MG/DL (ref 6–23)
CALCIUM SERPL-MCNC: 9 MG/DL (ref 8.6–10.6)
CHLORIDE SERPL-SCNC: 106 MMOL/L (ref 98–107)
CO2 SERPL-SCNC: 25 MMOL/L (ref 21–32)
CREAT SERPL-MCNC: 0.77 MG/DL (ref 0.5–1.3)
EGFRCR SERPLBLD CKD-EPI 2021: >90 ML/MIN/1.73M*2
EOSINOPHIL # BLD AUTO: 0 X10*3/UL (ref 0–0.4)
EOSINOPHIL NFR BLD AUTO: 0 %
ERYTHROCYTE [DISTWIDTH] IN BLOOD BY AUTOMATED COUNT: 13.6 % (ref 11.5–14.5)
GLUCOSE SERPL-MCNC: 97 MG/DL (ref 74–99)
HCT VFR BLD AUTO: 23.1 % (ref 41–52)
HGB BLD-MCNC: 7.8 G/DL (ref 13.5–17.5)
IMM GRANULOCYTES # BLD AUTO: 0 X10*3/UL (ref 0–0.5)
IMM GRANULOCYTES NFR BLD AUTO: 0 % (ref 0–0.9)
LYMPHOCYTES # BLD AUTO: 0.01 X10*3/UL (ref 0.8–3)
LYMPHOCYTES NFR BLD AUTO: 50 %
MAGNESIUM SERPL-MCNC: 1.72 MG/DL (ref 1.6–2.4)
MCH RBC QN AUTO: 34.8 PG (ref 26–34)
MCHC RBC AUTO-ENTMCNC: 33.8 G/DL (ref 32–36)
MCV RBC AUTO: 103 FL (ref 80–100)
MONOCYTES # BLD AUTO: 0.01 X10*3/UL (ref 0.05–0.8)
MONOCYTES NFR BLD AUTO: 50 %
NEUTROPHILS # BLD AUTO: 0 X10*3/UL (ref 1.6–5.5)
NEUTROPHILS NFR BLD AUTO: 0 %
NRBC BLD-RTO: 0 /100 WBCS (ref 0–0)
PHOSPHATE SERPL-MCNC: 3.1 MG/DL (ref 2.5–4.9)
PLATELET # BLD AUTO: 8 X10*3/UL (ref 150–450)
POTASSIUM SERPL-SCNC: 3.6 MMOL/L (ref 3.5–5.3)
PROT SERPL-MCNC: 4.8 G/DL (ref 6.4–8.2)
RBC # BLD AUTO: 2.24 X10*6/UL (ref 4.5–5.9)
RH FACTOR (ANTIGEN D): NORMAL
SODIUM SERPL-SCNC: 138 MMOL/L (ref 136–145)
URATE SERPL-MCNC: 3.7 MG/DL (ref 4–7.5)
WBC # BLD AUTO: <0.1 X10*3/UL (ref 4.4–11.3)

## 2024-02-16 PROCEDURE — 85025 COMPLETE CBC W/AUTO DIFF WBC: CPT | Performed by: PHYSICIAN ASSISTANT

## 2024-02-16 PROCEDURE — 86901 BLOOD TYPING SEROLOGIC RH(D): CPT

## 2024-02-16 PROCEDURE — 2500000002 HC RX 250 W HCPCS SELF ADMINISTERED DRUGS (ALT 637 FOR MEDICARE OP, ALT 636 FOR OP/ED): Performed by: PHYSICIAN ASSISTANT

## 2024-02-16 PROCEDURE — 80053 COMPREHEN METABOLIC PANEL: CPT | Performed by: PHYSICIAN ASSISTANT

## 2024-02-16 PROCEDURE — 36430 TRANSFUSION BLD/BLD COMPNT: CPT

## 2024-02-16 PROCEDURE — 2500000001 HC RX 250 WO HCPCS SELF ADMINISTERED DRUGS (ALT 637 FOR MEDICARE OP): Performed by: PHYSICIAN ASSISTANT

## 2024-02-16 PROCEDURE — 1170000001 HC PRIVATE ONCOLOGY ROOM DAILY

## 2024-02-16 PROCEDURE — 80076 HEPATIC FUNCTION PANEL: CPT | Performed by: PHYSICIAN ASSISTANT

## 2024-02-16 PROCEDURE — 2500000001 HC RX 250 WO HCPCS SELF ADMINISTERED DRUGS (ALT 637 FOR MEDICARE OP): Performed by: INTERNAL MEDICINE

## 2024-02-16 PROCEDURE — 84550 ASSAY OF BLOOD/URIC ACID: CPT | Performed by: PHYSICIAN ASSISTANT

## 2024-02-16 PROCEDURE — 86077 PHYS BLOOD BANK SERV XMATCH: CPT

## 2024-02-16 PROCEDURE — 93005 ELECTROCARDIOGRAM TRACING: CPT

## 2024-02-16 PROCEDURE — 84100 ASSAY OF PHOSPHORUS: CPT | Performed by: PHYSICIAN ASSISTANT

## 2024-02-16 PROCEDURE — 87040 BLOOD CULTURE FOR BACTERIA: CPT | Performed by: PHYSICIAN ASSISTANT

## 2024-02-16 PROCEDURE — P9073 PLATELETS PHERESIS PATH REDU: HCPCS

## 2024-02-16 PROCEDURE — 83735 ASSAY OF MAGNESIUM: CPT | Performed by: PHYSICIAN ASSISTANT

## 2024-02-16 PROCEDURE — 2500000004 HC RX 250 GENERAL PHARMACY W/ HCPCS (ALT 636 FOR OP/ED): Performed by: PHYSICIAN ASSISTANT

## 2024-02-16 PROCEDURE — 99233 SBSQ HOSP IP/OBS HIGH 50: CPT | Performed by: INTERNAL MEDICINE

## 2024-02-16 PROCEDURE — 2500000004 HC RX 250 GENERAL PHARMACY W/ HCPCS (ALT 636 FOR OP/ED): Performed by: INTERNAL MEDICINE

## 2024-02-16 PROCEDURE — 2500000005 HC RX 250 GENERAL PHARMACY W/O HCPCS: Performed by: PHYSICIAN ASSISTANT

## 2024-02-16 PROCEDURE — 36415 COLL VENOUS BLD VENIPUNCTURE: CPT | Performed by: PHYSICIAN ASSISTANT

## 2024-02-16 PROCEDURE — 86870 RBC ANTIBODY IDENTIFICATION: CPT

## 2024-02-16 PROCEDURE — 93010 ELECTROCARDIOGRAM REPORT: CPT | Performed by: INTERNAL MEDICINE

## 2024-02-16 RX ORDER — LORAZEPAM 0.5 MG/1
0.5 TABLET ORAL EVERY 6 HOURS SCHEDULED
Status: DISCONTINUED | OUTPATIENT
Start: 2024-02-16 | End: 2024-02-19

## 2024-02-16 RX ORDER — SODIUM CHLORIDE 9 MG/ML
75 INJECTION, SOLUTION INTRAVENOUS CONTINUOUS
Status: DISCONTINUED | OUTPATIENT
Start: 2024-02-16 | End: 2024-02-18

## 2024-02-16 RX ORDER — HALOPERIDOL 0.5 MG/1
0.5 TABLET ORAL EVERY 6 HOURS
Status: DISCONTINUED | OUTPATIENT
Start: 2024-02-16 | End: 2024-02-19

## 2024-02-16 RX ORDER — ONDANSETRON HYDROCHLORIDE 8 MG/1
8 TABLET, FILM COATED ORAL EVERY 8 HOURS PRN
Status: DISCONTINUED | OUTPATIENT
Start: 2024-02-16 | End: 2024-02-22 | Stop reason: HOSPADM

## 2024-02-16 RX ORDER — SUCRALFATE 1 G/10ML
1 SUSPENSION ORAL EVERY 6 HOURS PRN
Status: DISCONTINUED | OUTPATIENT
Start: 2024-02-16 | End: 2024-02-22 | Stop reason: HOSPADM

## 2024-02-16 RX ADMIN — PIPERACILLIN SODIUM AND TAZOBACTAM SODIUM 3.38 G: 3; .375 INJECTION, SOLUTION INTRAVENOUS at 14:46

## 2024-02-16 RX ADMIN — PANTOPRAZOLE SODIUM 40 MG: 40 TABLET, DELAYED RELEASE ORAL at 08:38

## 2024-02-16 RX ADMIN — PIPERACILLIN SODIUM AND TAZOBACTAM SODIUM 3.38 G: 3; .375 INJECTION, SOLUTION INTRAVENOUS at 20:36

## 2024-02-16 RX ADMIN — FLUCONAZOLE 400 MG: 200 TABLET ORAL at 08:38

## 2024-02-16 RX ADMIN — HALOPERIDOL 0.5 MG: 0.5 TABLET ORAL at 18:17

## 2024-02-16 RX ADMIN — ONDANSETRON HYDROCHLORIDE 8 MG: 8 TABLET, FILM COATED ORAL at 02:32

## 2024-02-16 RX ADMIN — LORAZEPAM 0.5 MG: 0.5 TABLET ORAL at 18:17

## 2024-02-16 RX ADMIN — LEVOFLOXACIN 500 MG: 500 TABLET, FILM COATED ORAL at 08:38

## 2024-02-16 RX ADMIN — LORAZEPAM 0.5 MG: 0.5 TABLET ORAL at 23:45

## 2024-02-16 RX ADMIN — ACYCLOVIR 400 MG: 400 TABLET ORAL at 20:36

## 2024-02-16 RX ADMIN — ONDANSETRON HYDROCHLORIDE 8 MG: 8 TABLET, FILM COATED ORAL at 10:37

## 2024-02-16 RX ADMIN — SODIUM CHLORIDE 75 ML/HR: 9 INJECTION, SOLUTION INTRAVENOUS at 14:46

## 2024-02-16 RX ADMIN — GABAPENTIN 300 MG: 300 CAPSULE ORAL at 14:00

## 2024-02-16 RX ADMIN — FILGRASTIM-SNDZ 480 MCG: 480 INJECTION, SOLUTION INTRAVENOUS; SUBCUTANEOUS at 12:57

## 2024-02-16 RX ADMIN — DULOXETINE HYDROCHLORIDE 30 MG: 30 CAPSULE, DELAYED RELEASE ORAL at 20:36

## 2024-02-16 RX ADMIN — PROCHLORPERAZINE MALEATE 10 MG: 10 TABLET ORAL at 05:41

## 2024-02-16 RX ADMIN — GABAPENTIN 600 MG: 300 CAPSULE ORAL at 20:36

## 2024-02-16 RX ADMIN — ACYCLOVIR 400 MG: 400 TABLET ORAL at 08:38

## 2024-02-16 RX ADMIN — GABAPENTIN 300 MG: 300 CAPSULE ORAL at 08:38

## 2024-02-16 RX ADMIN — HALOPERIDOL 0.5 MG: 0.5 TABLET ORAL at 23:45

## 2024-02-16 RX ADMIN — PROCHLORPERAZINE EDISYLATE 10 MG: 5 INJECTION INTRAMUSCULAR; INTRAVENOUS at 14:31

## 2024-02-16 ASSESSMENT — COGNITIVE AND FUNCTIONAL STATUS - GENERAL
MOBILITY SCORE: 24
DAILY ACTIVITIY SCORE: 24
MOBILITY SCORE: 24
DAILY ACTIVITIY SCORE: 24

## 2024-02-16 ASSESSMENT — PAIN SCALES - GENERAL
PAINLEVEL_OUTOF10: 0 - NO PAIN

## 2024-02-16 NOTE — CARE PLAN
The patient's goals for the shift include    Problem: Fall/Injury  Goal: Not fall by end of shift  Outcome: Progressing  Goal: Be free from injury by end of the shift  Outcome: Progressing  Goal: Verbalize understanding of personal risk factors for fall in the hospital  Outcome: Progressing  Goal: Verbalize understanding of risk factor reduction measures to prevent injury from fall in the home  Outcome: Progressing  Goal: Use assistive devices by end of the shift  Outcome: Progressing  Goal: Pace activities to prevent fatigue by end of the shift  Outcome: Progressing     Problem: Pain - Adult  Goal: Verbalizes/displays adequate comfort level or baseline comfort level  Outcome: Progressing     Problem: Safety - Adult  Goal: Free from fall injury  Outcome: Progressing     Problem: Discharge Planning  Goal: Discharge to home or other facility with appropriate resources  Outcome: Progressing     Problem: Chronic Conditions and Co-morbidities  Goal: Patient's chronic conditions and co-morbidity symptoms are monitored and maintained or improved  Outcome: Progressing       The clinical goals for the shift include pt free from injury throughout shift.

## 2024-02-16 NOTE — CARE PLAN
The patient's goals for the shift include    Problem: Fall/Injury  Goal: Not fall by end of shift  Outcome: Progressing  Goal: Be free from injury by end of the shift  Outcome: Progressing  Goal: Verbalize understanding of personal risk factors for fall in the hospital  Outcome: Progressing  Goal: Verbalize understanding of risk factor reduction measures to prevent injury from fall in the home  Outcome: Progressing  Goal: Use assistive devices by end of the shift  Outcome: Progressing  Goal: Pace activities to prevent fatigue by end of the shift  Outcome: Progressing     Problem: Pain - Adult  Goal: Verbalizes/displays adequate comfort level or baseline comfort level  Outcome: Progressing     Problem: Safety - Adult  Goal: Free from fall injury  Outcome: Progressing       The clinical goals for the shift include pt will report improved nausea

## 2024-02-16 NOTE — PROGRESS NOTES
"Music Therapy Note      Therapy Session  Referral Type: New referral this admission  Visit Type: New visit  Session Start Time: 1453  Session End Time: 1458  Intervention Delivery: In-person  Family Present for Session: None  Number of staff members present: 1 (mt intern)               Treatment/Interventions  Areas of Focus: Coping, Normalization, Self-expression  Music Therapy Interventions: Assessment, Music sharing/discussion    Post-assessment  Total Session Time (min): 5 minutes    Narrative  Assessment Detail: Found pt sitting on edge of bed, appeared \"down\" and stated feeling \"bummed\" as he's been vomiting and hurting today.  Plan: Introduce services and assess goals/ preferences in music therapy.  Evaluation: Pt shared that his treatment has been a lot on his body, and that he is also dealing with an injured shoulder which can't be fixed until the cancer is taken care of. Pt went on to say he used to play guitar before this injury and would love to hear MTs play sometime. Says he likes old country and Amish music and will look forward to hearing some next week. Appreciative of services and visit.  Follow-up: Will continue to follow.    Education Documentation  No documentation found.          "

## 2024-02-16 NOTE — PROGRESS NOTES
David Ferreira is a 78 y.o. male on day 11 of admission presenting with IgG multiple myeloma (CMS/HCC).    Subjective     Afebrile, no overnight events.      This morning (02/16/24), he reports one episode of vomiting and admits to some new LLQ abdominal tenderness on exam. Still endorses excellent PO fluid intake.     Objective     Physical Exam  Constitutional:       General: He is not in acute distress.  HENT:      Head: Normocephalic and atraumatic.      Mouth/Throat:      Mouth: Mucous membranes are moist.      Pharynx: No oropharyngeal exudate or posterior oropharyngeal erythema.        Comments: Area, about 1/2 dime-sized of purplish discoloration on the tip of the tongue; per patient this is chronic, intermittent. There are other purplish, non-ulcerated lesions of the same size, one on the L interior angle and another on the R buccal mucosa, without evidence of bleeding.    Eyes:      Extraocular Movements: Extraocular movements intact.      Pupils: Pupils are equal, round, and reactive to light.   Cardiovascular:      Rate and Rhythm: Normal rate and regular rhythm.      Heart sounds: No murmur heard.     No friction rub. No gallop.   Pulmonary:      Effort: No respiratory distress.      Breath sounds: Normal breath sounds. No wheezing or rhonchi.   Abdominal:      General: Bowel sounds are normal.      Palpations: Abdomen is soft.      Tenderness: There is no abdominal tenderness.       Musculoskeletal:         General: Normal range of motion.      Right hand: Swelling (trace non-pittting edema of the R hand) present.      Right lower leg: No edema.      Left lower leg: No edema.   Skin:     General: Skin is warm and dry.   Neurological:      Mental Status: He is alert and oriented to person, place, and time.      Motor: No weakness.   Psychiatric:         Mood and Affect: Mood normal.         Behavior: Behavior normal.       Assessment/Plan   Principal Problem:    IgG multiple myeloma (CMS/HCC)  Active  Problems:    Peripheral neuropathy    Stem cells transplant status (CMS/HCC)    Chemotherapy induced nausea and vomiting    David Ferreira is a 78 y.o. male with IgG [place] [facility] in [place] admitted for his Autologous SCT with Sonja 140 (T=0, 2/07/24)     T+9 (02/16/24)  from Autologous SCT prepped with Melphalan 140mg/m2    Active issues today (02/16/24):  # LLQ tenderness in the setting of neutropenia without signs of sepsis. CT pending. BC pending. Started pip-tazo.   # Chemotherapy induced nausea, worsening. Switched antiemetic regimen.      ONC  # IgG [place] Multiple Myeloma  - Currently in [place]  - Autologous SCT (2/07/24) prepped with Melphalan 140mg/m2     HEME  # Pancytopenia  :: Due to disease and chemotherapy   - Transfuse for Hgb < 7 g/dL, platelets < 10 k/uL  - Neupogen (2/12- )  - Platelets for AM count of 8 k/uL (2/16/24)      ID  # LLQ tenderness in the setting of neutropenia without signs of sepsis (2/16/24)  - No fevers, tachycardia, or hypotension  - Bcx x 2 (2/16/24): pending  - CT abdomen pelvis w contrast (2/16/24): pending  - Start piperacillin-tazobactam 3.375 g Q6H (2/16- )  # Prophylaxis  - ACV, Fluconazole     FEN/GI  - Admit wt: 73.9 kg  Current wt: 73.2  kg (02/16/24)  - IVF: NS @ 75 mL/hr (2/16/24)  # Chemotherapy induced nausea and vomiting  - Start haloperidol 0.5 mg + lorazepam 0.5 mg PO Q6H for delayed CINV, per Dr. Freire  - Qtc 404 ms (2/16/24)  - Ondansetron 8 mg PO TID moved to PRN (2/16- )  - Continue prochlorperazine PRN  - Could consider olanzapine 2.5 mg at bedtime if not resolved later  # PUD prophy: Protonix  # Protein malnutrition  - Supplements ordered TID with meals     CV  # Functional surveillance  - ECHO (11/7/23) with EF of 65%     MSK  # R Rotator cuff tear  - Continue Capsaicin and oxycodone as needed     NEURO:  # Peripheral neuropathy B/l LE and 2/2 B/L carpal tunnel syndrome s/p repair  - Continue gabapentin and Cymbalta 30mg     ACCESS/SUPPORT/DISPO  - Full code  -  Trialysis  - Primary oncologist: Dr. Contreras     Patient seen examined and discussed with Dr. Tani Ramirez PA-C

## 2024-02-17 ENCOUNTER — APPOINTMENT (OUTPATIENT)
Dept: RADIOLOGY | Facility: HOSPITAL | Age: 79
DRG: 016 | End: 2024-02-17
Payer: MEDICARE

## 2024-02-17 LAB
ABO GROUP (TYPE) IN BLOOD: NORMAL
ALBUMIN SERPL BCP-MCNC: 2.7 G/DL (ref 3.4–5)
ALP SERPL-CCNC: 64 U/L (ref 33–136)
ALT SERPL W P-5'-P-CCNC: 5 U/L (ref 10–52)
ANION GAP SERPL CALC-SCNC: 8 MMOL/L (ref 10–20)
ANTIBODY SCREEN: NORMAL
AST SERPL W P-5'-P-CCNC: 10 U/L (ref 9–39)
BASOPHILS # BLD AUTO: 0 X10*3/UL (ref 0–0.1)
BASOPHILS NFR BLD AUTO: 0 %
BILIRUB DIRECT SERPL-MCNC: 0.1 MG/DL (ref 0–0.3)
BILIRUB SERPL-MCNC: 0.5 MG/DL (ref 0–1.2)
BLOOD EXPIRATION DATE: NORMAL
BLOOD EXPIRATION DATE: NORMAL
BUN SERPL-MCNC: 13 MG/DL (ref 6–23)
CALCIUM SERPL-MCNC: 8.5 MG/DL (ref 8.6–10.6)
CHLORIDE SERPL-SCNC: 106 MMOL/L (ref 98–107)
CO2 SERPL-SCNC: 26 MMOL/L (ref 21–32)
CREAT SERPL-MCNC: 0.92 MG/DL (ref 0.5–1.3)
DISPENSE STATUS: NORMAL
DISPENSE STATUS: NORMAL
EGFRCR SERPLBLD CKD-EPI 2021: 86 ML/MIN/1.73M*2
EOSINOPHIL # BLD AUTO: 0 X10*3/UL (ref 0–0.4)
EOSINOPHIL NFR BLD AUTO: 0 %
ERYTHROCYTE [DISTWIDTH] IN BLOOD BY AUTOMATED COUNT: 13.6 % (ref 11.5–14.5)
GLUCOSE SERPL-MCNC: 117 MG/DL (ref 74–99)
HCT VFR BLD AUTO: 19.7 % (ref 41–52)
HGB BLD-MCNC: 6.6 G/DL (ref 13.5–17.5)
IMM GRANULOCYTES # BLD AUTO: 0 X10*3/UL (ref 0–0.5)
IMM GRANULOCYTES NFR BLD AUTO: 0 % (ref 0–0.9)
LACTATE SERPL-SCNC: 0.9 MMOL/L (ref 0.4–2)
LYMPHOCYTES # BLD AUTO: 0.01 X10*3/UL (ref 0.8–3)
LYMPHOCYTES NFR BLD AUTO: 20 %
MAGNESIUM SERPL-MCNC: 1.64 MG/DL (ref 1.6–2.4)
MCH RBC QN AUTO: 34.9 PG (ref 26–34)
MCHC RBC AUTO-ENTMCNC: 33.5 G/DL (ref 32–36)
MCV RBC AUTO: 104 FL (ref 80–100)
MONOCYTES # BLD AUTO: 0.02 X10*3/UL (ref 0.05–0.8)
MONOCYTES NFR BLD AUTO: 40 %
NEUTROPHILS # BLD AUTO: 0.02 X10*3/UL (ref 1.6–5.5)
NEUTROPHILS NFR BLD AUTO: 40 %
NRBC BLD-RTO: 0 /100 WBCS (ref 0–0)
PHOSPHATE SERPL-MCNC: 2.9 MG/DL (ref 2.5–4.9)
PLATELET # BLD AUTO: 8 X10*3/UL (ref 150–450)
POTASSIUM SERPL-SCNC: 3.5 MMOL/L (ref 3.5–5.3)
PRODUCT BLOOD TYPE: 5100
PRODUCT BLOOD TYPE: 7300
PRODUCT CODE: NORMAL
PRODUCT CODE: NORMAL
PROT SERPL-MCNC: 4.4 G/DL (ref 6.4–8.2)
RBC # BLD AUTO: 1.89 X10*6/UL (ref 4.5–5.9)
RH FACTOR (ANTIGEN D): NORMAL
SODIUM SERPL-SCNC: 136 MMOL/L (ref 136–145)
UNIT ABO: NORMAL
UNIT ABO: NORMAL
UNIT NUMBER: NORMAL
UNIT NUMBER: NORMAL
UNIT RH: NORMAL
UNIT RH: NORMAL
UNIT VOLUME: 211
UNIT VOLUME: 273
URATE SERPL-MCNC: 3 MG/DL (ref 4–7.5)
WBC # BLD AUTO: 0.1 X10*3/UL (ref 4.4–11.3)

## 2024-02-17 PROCEDURE — 2500000001 HC RX 250 WO HCPCS SELF ADMINISTERED DRUGS (ALT 637 FOR MEDICARE OP): Performed by: PHYSICIAN ASSISTANT

## 2024-02-17 PROCEDURE — 82248 BILIRUBIN DIRECT: CPT | Performed by: PHYSICIAN ASSISTANT

## 2024-02-17 PROCEDURE — 86880 COOMBS TEST DIRECT: CPT | Mod: 91

## 2024-02-17 PROCEDURE — 86922 COMPATIBILITY TEST ANTIGLOB: CPT | Mod: 91

## 2024-02-17 PROCEDURE — 99233 SBSQ HOSP IP/OBS HIGH 50: CPT | Performed by: INTERNAL MEDICINE

## 2024-02-17 PROCEDURE — 36430 TRANSFUSION BLD/BLD COMPNT: CPT

## 2024-02-17 PROCEDURE — 83735 ASSAY OF MAGNESIUM: CPT | Performed by: PHYSICIAN ASSISTANT

## 2024-02-17 PROCEDURE — 86870 RBC ANTIBODY IDENTIFICATION: CPT | Mod: 91

## 2024-02-17 PROCEDURE — 84100 ASSAY OF PHOSPHORUS: CPT | Performed by: PHYSICIAN ASSISTANT

## 2024-02-17 PROCEDURE — 83605 ASSAY OF LACTIC ACID: CPT

## 2024-02-17 PROCEDURE — 74177 CT ABD & PELVIS W/CONTRAST: CPT

## 2024-02-17 PROCEDURE — 30243R1 TRANSFUSION OF NONAUTOLOGOUS PLATELETS INTO CENTRAL VEIN, PERCUTANEOUS APPROACH: ICD-10-PCS | Performed by: INTERNAL MEDICINE

## 2024-02-17 PROCEDURE — 87493 C DIFF AMPLIFIED PROBE: CPT

## 2024-02-17 PROCEDURE — 1170000001 HC PRIVATE ONCOLOGY ROOM DAILY

## 2024-02-17 PROCEDURE — 80053 COMPREHEN METABOLIC PANEL: CPT | Performed by: PHYSICIAN ASSISTANT

## 2024-02-17 PROCEDURE — 2500000002 HC RX 250 W HCPCS SELF ADMINISTERED DRUGS (ALT 637 FOR MEDICARE OP, ALT 636 FOR OP/ED): Performed by: PHYSICIAN ASSISTANT

## 2024-02-17 PROCEDURE — 86901 BLOOD TYPING SEROLOGIC RH(D): CPT | Mod: 91

## 2024-02-17 PROCEDURE — 2500000004 HC RX 250 GENERAL PHARMACY W/ HCPCS (ALT 636 FOR OP/ED): Performed by: PHYSICIAN ASSISTANT

## 2024-02-17 PROCEDURE — 74177 CT ABD & PELVIS W/CONTRAST: CPT | Performed by: STUDENT IN AN ORGANIZED HEALTH CARE EDUCATION/TRAINING PROGRAM

## 2024-02-17 PROCEDURE — 2500000001 HC RX 250 WO HCPCS SELF ADMINISTERED DRUGS (ALT 637 FOR MEDICARE OP)

## 2024-02-17 PROCEDURE — 2550000001 HC RX 255 CONTRASTS: Performed by: INTERNAL MEDICINE

## 2024-02-17 PROCEDURE — 85025 COMPLETE CBC W/AUTO DIFF WBC: CPT | Performed by: PHYSICIAN ASSISTANT

## 2024-02-17 PROCEDURE — 85007 BL SMEAR W/DIFF WBC COUNT: CPT

## 2024-02-17 PROCEDURE — 2500000001 HC RX 250 WO HCPCS SELF ADMINISTERED DRUGS (ALT 637 FOR MEDICARE OP): Performed by: INTERNAL MEDICINE

## 2024-02-17 PROCEDURE — 2500000002 HC RX 250 W HCPCS SELF ADMINISTERED DRUGS (ALT 637 FOR MEDICARE OP, ALT 636 FOR OP/ED)

## 2024-02-17 PROCEDURE — P9037 PLATE PHERES LEUKOREDU IRRAD: HCPCS

## 2024-02-17 PROCEDURE — 2500000004 HC RX 250 GENERAL PHARMACY W/ HCPCS (ALT 636 FOR OP/ED)

## 2024-02-17 PROCEDURE — 86900 BLOOD TYPING SEROLOGIC ABO: CPT | Mod: 91

## 2024-02-17 PROCEDURE — 84550 ASSAY OF BLOOD/URIC ACID: CPT | Performed by: PHYSICIAN ASSISTANT

## 2024-02-17 PROCEDURE — 2500000004 HC RX 250 GENERAL PHARMACY W/ HCPCS (ALT 636 FOR OP/ED): Mod: JZ | Performed by: INTERNAL MEDICINE

## 2024-02-17 PROCEDURE — 86971 RBC PRETX INCUBATJ W/ENZYMES: CPT | Mod: 91

## 2024-02-17 PROCEDURE — 85027 COMPLETE CBC AUTOMATED: CPT | Mod: 91

## 2024-02-17 RX ORDER — POTASSIUM CHLORIDE 20 MEQ/1
40 TABLET, EXTENDED RELEASE ORAL ONCE
Status: COMPLETED | OUTPATIENT
Start: 2024-02-17 | End: 2024-02-17

## 2024-02-17 RX ORDER — MAGNESIUM SULFATE HEPTAHYDRATE 40 MG/ML
2 INJECTION, SOLUTION INTRAVENOUS ONCE
Status: COMPLETED | OUTPATIENT
Start: 2024-02-17 | End: 2024-02-17

## 2024-02-17 RX ADMIN — MAGNESIUM SULFATE 2 G: 2 INJECTION INTRAVENOUS at 10:40

## 2024-02-17 RX ADMIN — LORAZEPAM 0.5 MG: 0.5 TABLET ORAL at 12:06

## 2024-02-17 RX ADMIN — ACYCLOVIR 400 MG: 400 TABLET ORAL at 22:46

## 2024-02-17 RX ADMIN — IOHEXOL 80 ML: 350 INJECTION, SOLUTION INTRAVENOUS at 01:58

## 2024-02-17 RX ADMIN — GABAPENTIN 600 MG: 300 CAPSULE ORAL at 22:46

## 2024-02-17 RX ADMIN — OXYCODONE HYDROCHLORIDE 5 MG: 5 TABLET ORAL at 23:38

## 2024-02-17 RX ADMIN — PIPERACILLIN SODIUM AND TAZOBACTAM SODIUM 3.38 G: 3; .375 INJECTION, SOLUTION INTRAVENOUS at 02:13

## 2024-02-17 RX ADMIN — FLUCONAZOLE 400 MG: 200 TABLET ORAL at 08:22

## 2024-02-17 RX ADMIN — HALOPERIDOL 0.5 MG: 0.5 TABLET ORAL at 05:38

## 2024-02-17 RX ADMIN — LORAZEPAM 0.5 MG: 0.5 TABLET ORAL at 17:32

## 2024-02-17 RX ADMIN — ACYCLOVIR 400 MG: 400 TABLET ORAL at 08:22

## 2024-02-17 RX ADMIN — PANTOPRAZOLE SODIUM 40 MG: 40 TABLET, DELAYED RELEASE ORAL at 06:27

## 2024-02-17 RX ADMIN — PIPERACILLIN SODIUM AND TAZOBACTAM SODIUM 3.38 G: 3; .375 INJECTION, SOLUTION INTRAVENOUS at 08:21

## 2024-02-17 RX ADMIN — POTASSIUM CHLORIDE 40 MEQ: 1500 TABLET, EXTENDED RELEASE ORAL at 10:40

## 2024-02-17 RX ADMIN — PIPERACILLIN SODIUM AND TAZOBACTAM SODIUM 3.38 G: 3; .375 INJECTION, SOLUTION INTRAVENOUS at 13:45

## 2024-02-17 RX ADMIN — HALOPERIDOL 0.5 MG: 0.5 TABLET ORAL at 23:44

## 2024-02-17 RX ADMIN — HALOPERIDOL 0.5 MG: 0.5 TABLET ORAL at 12:06

## 2024-02-17 RX ADMIN — GABAPENTIN 300 MG: 300 CAPSULE ORAL at 08:22

## 2024-02-17 RX ADMIN — LOPERAMIDE HYDROCHLORIDE 2 MG: 2 CAPSULE ORAL at 23:39

## 2024-02-17 RX ADMIN — SODIUM CHLORIDE 75 ML/HR: 9 INJECTION, SOLUTION INTRAVENOUS at 17:49

## 2024-02-17 RX ADMIN — HALOPERIDOL 0.5 MG: 0.5 TABLET ORAL at 17:32

## 2024-02-17 RX ADMIN — DULOXETINE HYDROCHLORIDE 30 MG: 30 CAPSULE, DELAYED RELEASE ORAL at 22:46

## 2024-02-17 RX ADMIN — LORAZEPAM 0.5 MG: 0.5 TABLET ORAL at 23:44

## 2024-02-17 RX ADMIN — Medication 10 MG: at 22:46

## 2024-02-17 RX ADMIN — FILGRASTIM-SNDZ 480 MCG: 480 INJECTION, SOLUTION INTRAVENOUS; SUBCUTANEOUS at 12:06

## 2024-02-17 RX ADMIN — GABAPENTIN 300 MG: 300 CAPSULE ORAL at 13:45

## 2024-02-17 RX ADMIN — LORAZEPAM 0.5 MG: 0.5 TABLET ORAL at 05:38

## 2024-02-17 RX ADMIN — PIPERACILLIN SODIUM AND TAZOBACTAM SODIUM 4.5 G: 4; .5 INJECTION, SOLUTION INTRAVENOUS at 22:47

## 2024-02-17 ASSESSMENT — COGNITIVE AND FUNCTIONAL STATUS - GENERAL
TOILETING: A LITTLE
DAILY ACTIVITIY SCORE: 23
CLIMB 3 TO 5 STEPS WITH RAILING: A LITTLE
MOVING TO AND FROM BED TO CHAIR: A LITTLE
WALKING IN HOSPITAL ROOM: A LITTLE
MOBILITY SCORE: 21

## 2024-02-17 ASSESSMENT — PAIN DESCRIPTION - DESCRIPTORS: DESCRIPTORS: ACHING;DULL

## 2024-02-17 ASSESSMENT — PAIN DESCRIPTION - LOCATION: LOCATION: SHOULDER

## 2024-02-17 ASSESSMENT — PAIN - FUNCTIONAL ASSESSMENT
PAIN_FUNCTIONAL_ASSESSMENT: 0-10

## 2024-02-17 ASSESSMENT — PAIN SCALES - GENERAL
PAINLEVEL_OUTOF10: 0 - NO PAIN
PAINLEVEL_OUTOF10: 0 - NO PAIN
PAINLEVEL_OUTOF10: 5 - MODERATE PAIN

## 2024-02-17 ASSESSMENT — PAIN DESCRIPTION - ORIENTATION: ORIENTATION: RIGHT

## 2024-02-17 NOTE — PROGRESS NOTES
David Ferreira is a 78 y.o. male on day 12 of admission presenting with IgG multiple myeloma (CMS/HCC).    Subjective     Afebrile, no overnight events.      This morning (02/17/24), he reports overall feeling much improved denying any further nausea or vomiting and stated that his abdominal pain is improved. Remainder of ROS negative.     Objective     Physical Exam  Constitutional:       General: He is not in acute distress.  HENT:      Head: Normocephalic and atraumatic.      Mouth/Throat:      Mouth: Mucous membranes are moist.      Pharynx: No oropharyngeal exudate or posterior oropharyngeal erythema.        Comments: Area, about 1/2 dime-sized of purplish discoloration on the tip of the tongue; per patient this is chronic, intermittent. There are other purplish, non-ulcerated lesions of the same size, one on the L interior angle and another on the R buccal mucosa, without evidence of bleeding.    Eyes:      Extraocular Movements: Extraocular movements intact.      Pupils: Pupils are equal, round, and reactive to light.   Cardiovascular:      Rate and Rhythm: Normal rate and regular rhythm.      Heart sounds: No murmur heard.     No friction rub. No gallop.   Pulmonary:      Effort: No respiratory distress.      Breath sounds: Normal breath sounds. No wheezing or rhonchi.   Abdominal:      General: Bowel sounds are normal.      Palpations: Abdomen is soft.      Tenderness: There is abdominal tenderness. There is rebound.          Comments: LLQ nontender with palpation but rebound tenderness present.    Musculoskeletal:         General: Normal range of motion.      Right hand: Swelling (trace non-pittting edema of the R hand) present.      Right lower leg: No edema.      Left lower leg: No edema.   Skin:     General: Skin is warm and dry.   Neurological:      Mental Status: He is alert and oriented to person, place, and time.      Motor: No weakness.   Psychiatric:         Mood and Affect: Mood normal.          Behavior: Behavior normal.       Assessment/Plan   Principal Problem:    IgG multiple myeloma (CMS/HCC)  Active Problems:    Peripheral neuropathy    Stem cells transplant status (CMS/HCC)    Chemotherapy induced nausea and vomiting    David Ferreira is a 78 y.o. male with IgG [place] [facility] in [place] admitted for his Autologous SCT with Sonja 140 (T=0, 2/07/24)     T+10 (02/17/24)  from Autologous SCT prepped with Melphalan 140mg/m2    Active issues today (02/17/24):  # LLQ tenderness in the setting of neutropenia without signs of sepsis. CT pending. BC pending. Started Zosyn on 2/16, dose increased to 4.5 today for coverage of diverticulitis seen on CT.   # Chemotherapy induced nausea, worsening. Switched antiemetic regimen.      ONC  # IgG Kappa Multiple Myeloma  - Currently in [place]  - Autologous SCT (2/07/24) prepped with Melphalan 140mg/m2     HEME  # Pancytopenia  :: Due to disease and chemotherapy   - Transfuse for Hgb < 7 g/dL, platelets < 10 k/uL  - Neupogen (2/12- )  - Platelets for AM count of 8 k/uL (2/16/24)      ID  # LLQ tenderness in the setting of neutropenia without signs of sepsis (2/16/24)  - No fevers, tachycardia, or hypotension  - Bcx x 2 (2/16/24): pending  - Checking Cdiff (2/17)  - CT abdomen pelvis w contrast (2/16/24): Diverticulitis of the sigmoid colon.   - Continue piperacillin-tazobactam 3.375 g Q6H (2/16- ), dose incrased to 4.5 on 2/17.  # Prophylaxis  - ACV, Fluconazole     FEN/GI  - Admit wt: 73.9 kg  Current wt: 73.2  kg (02/17/24)  - IVF: NS @ 75 mL/hr (2/16/24)  # Chemotherapy induced nausea and vomiting  - Start haloperidol 0.5 mg + lorazepam 0.5 mg PO Q6H for delayed CINV, per Dr. Freire  - Qtc 404 ms (2/16/24)  - Ondansetron 8 mg PO TID moved to PRN (2/16- )  - Continue prochlorperazine PRN  - Could consider olanzapine 2.5 mg at bedtime if not resolved later  # PUD prophy: Protonix  # Protein malnutrition  - Supplements ordered TID with meals     CV  # Functional surveillance  -  ECHO (11/7/23) with EF of 65%     MSK  # R Rotator cuff tear  - Continue Capsaicin and oxycodone as needed  #Abnormal CT finding  - CT A/P 2/17: diffuse thickening and edema surrounding bilateral psoas  muscles, gluteal muscles, and quadratus femoris muscles. There is  diffuse periarticular soft tissue thickening likely related to  synovial/capsular thickening (right > left). There is also diffuse  soft tissue thickening along the posterior aspects of the greater  trochanters (right > left); for example, there is of 4.4 cm x 4 cm  area of heterogeneous soft tissue posterior to the right greater  trochanter with scattered foci of calcification that could be related  to calcium hydroxy appetite deposition disease.   - Radiology recommending further assessment with MRI pelvis w/ and w/o contrast.      NEURO:  # Peripheral neuropathy B/l 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 Contreras     Patient seen examined and discussed with Dr. Tani Stern PA-C

## 2024-02-18 ENCOUNTER — APPOINTMENT (OUTPATIENT)
Dept: RADIOLOGY | Facility: HOSPITAL | Age: 79
DRG: 016 | End: 2024-02-18
Payer: MEDICARE

## 2024-02-18 LAB
ALBUMIN SERPL BCP-MCNC: 3 G/DL (ref 3.4–5)
ALP SERPL-CCNC: 69 U/L (ref 33–136)
ALT SERPL W P-5'-P-CCNC: 9 U/L (ref 10–52)
ANION GAP SERPL CALC-SCNC: 13 MMOL/L (ref 10–20)
APPEARANCE UR: ABNORMAL
AST SERPL W P-5'-P-CCNC: 9 U/L (ref 9–39)
BASOPHILS # BLD MANUAL: 0 X10*3/UL (ref 0–0.1)
BASOPHILS NFR BLD MANUAL: 0 %
BILIRUB DIRECT SERPL-MCNC: 0.2 MG/DL (ref 0–0.3)
BILIRUB SERPL-MCNC: 0.6 MG/DL (ref 0–1.2)
BILIRUB UR STRIP.AUTO-MCNC: NEGATIVE MG/DL
BUN SERPL-MCNC: 10 MG/DL (ref 6–23)
C DIF TOX TCDA+TCDB STL QL NAA+PROBE: NOT DETECTED
CALCIUM SERPL-MCNC: 9.1 MG/DL (ref 8.6–10.6)
CHLORIDE SERPL-SCNC: 106 MMOL/L (ref 98–107)
CO2 SERPL-SCNC: 23 MMOL/L (ref 21–32)
COLOR UR: ABNORMAL
CREAT SERPL-MCNC: 1.01 MG/DL (ref 0.5–1.3)
EGFRCR SERPLBLD CKD-EPI 2021: 77 ML/MIN/1.73M*2
EOSINOPHIL # BLD MANUAL: 0 X10*3/UL (ref 0–0.4)
EOSINOPHIL NFR BLD MANUAL: 0 %
ERYTHROCYTE [DISTWIDTH] IN BLOOD BY AUTOMATED COUNT: 14.2 % (ref 11.5–14.5)
ERYTHROCYTE [DISTWIDTH] IN BLOOD BY AUTOMATED COUNT: 14.2 % (ref 11.5–14.5)
ERYTHROCYTE [DISTWIDTH] IN BLOOD BY AUTOMATED COUNT: 14.4 % (ref 11.5–14.5)
GLUCOSE SERPL-MCNC: 101 MG/DL (ref 74–99)
GLUCOSE UR STRIP.AUTO-MCNC: NORMAL MG/DL
HCT VFR BLD AUTO: 19 % (ref 41–52)
HCT VFR BLD AUTO: 20.2 % (ref 41–52)
HCT VFR BLD AUTO: 22.5 % (ref 41–52)
HGB BLD-MCNC: 6.4 G/DL (ref 13.5–17.5)
HGB BLD-MCNC: 6.7 G/DL (ref 13.5–17.5)
HGB BLD-MCNC: 7.3 G/DL (ref 13.5–17.5)
IMM GRANULOCYTES # BLD AUTO: 0 X10*3/UL (ref 0–0.5)
IMM GRANULOCYTES # BLD AUTO: 0.01 X10*3/UL (ref 0–0.5)
IMM GRANULOCYTES # BLD AUTO: 0.01 X10*3/UL (ref 0–0.5)
IMM GRANULOCYTES NFR BLD AUTO: 0 % (ref 0–0.9)
IMM GRANULOCYTES NFR BLD AUTO: 0.6 % (ref 0–0.9)
IMM GRANULOCYTES NFR BLD AUTO: 1.3 % (ref 0–0.9)
KETONES UR STRIP.AUTO-MCNC: NEGATIVE MG/DL
LACTATE SERPL-SCNC: 1.1 MMOL/L (ref 0.4–2)
LEUKOCYTE ESTERASE UR QL STRIP.AUTO: NEGATIVE
LYMPHOCYTES # BLD MANUAL: 0.01 X10*3/UL (ref 0.8–3)
LYMPHOCYTES # BLD MANUAL: 0.04 X10*3/UL (ref 0.8–3)
LYMPHOCYTES # BLD MANUAL: 0.04 X10*3/UL (ref 0.8–3)
LYMPHOCYTES NFR BLD MANUAL: 1.6 %
LYMPHOCYTES NFR BLD MANUAL: 10.9 %
LYMPHOCYTES NFR BLD MANUAL: 2.2 %
MAGNESIUM SERPL-MCNC: 2.05 MG/DL (ref 1.6–2.4)
MCH RBC QN AUTO: 34.1 PG (ref 26–34)
MCH RBC QN AUTO: 34.7 PG (ref 26–34)
MCH RBC QN AUTO: 35.8 PG (ref 26–34)
MCHC RBC AUTO-ENTMCNC: 32.4 G/DL (ref 32–36)
MCHC RBC AUTO-ENTMCNC: 33.2 G/DL (ref 32–36)
MCHC RBC AUTO-ENTMCNC: 33.7 G/DL (ref 32–36)
MCV RBC AUTO: 105 FL (ref 80–100)
MCV RBC AUTO: 105 FL (ref 80–100)
MCV RBC AUTO: 106 FL (ref 80–100)
MONOCYTES # BLD MANUAL: 0.03 X10*3/UL (ref 0.05–0.8)
MONOCYTES # BLD MANUAL: 0.07 X10*3/UL (ref 0.05–0.8)
MONOCYTES # BLD MANUAL: 0.07 X10*3/UL (ref 0.05–0.8)
MONOCYTES NFR BLD MANUAL: 17.4 %
MONOCYTES NFR BLD MANUAL: 3.9 %
MONOCYTES NFR BLD MANUAL: 4.3 %
MUCOUS THREADS #/AREA URNS AUTO: NORMAL /LPF
NEUTROPHILS # BLD MANUAL: 0.75 X10*3/UL (ref 1.6–5.5)
NEUTROPHILS # BLD MANUAL: 1.5 X10*3/UL (ref 1.6–5.5)
NEUTS BAND # BLD MANUAL: 0.14 X10*3/UL (ref 0–0.5)
NEUTS BAND # BLD MANUAL: 0.41 X10*3/UL (ref 0–0.5)
NEUTS BAND NFR BLD MANUAL: 17.8 %
NEUTS BAND NFR BLD MANUAL: 25.4 %
NEUTS SEG # BLD MANUAL: 0.27 X10*3/UL (ref 1.6–5)
NEUTS SEG # BLD MANUAL: 0.61 X10*3/UL (ref 1.6–5)
NEUTS SEG # BLD MANUAL: 1.09 X10*3/UL (ref 1.6–5)
NEUTS SEG NFR BLD MANUAL: 67.4 %
NEUTS SEG NFR BLD MANUAL: 68.1 %
NEUTS SEG NFR BLD MANUAL: 76.7 %
NITRITE UR QL STRIP.AUTO: NEGATIVE
NRBC BLD-RTO: 0 /100 WBCS (ref 0–0)
OVALOCYTES BLD QL SMEAR: ABNORMAL
PH UR STRIP.AUTO: 5.5 [PH]
PHOSPHATE SERPL-MCNC: 3.1 MG/DL (ref 2.5–4.9)
PLATELET # BLD AUTO: 11 X10*3/UL (ref 150–450)
PLATELET # BLD AUTO: 13 X10*3/UL (ref 150–450)
PLATELET # BLD AUTO: 15 X10*3/UL (ref 150–450)
POLYCHROMASIA BLD QL SMEAR: ABNORMAL
POTASSIUM SERPL-SCNC: 3.5 MMOL/L (ref 3.5–5.3)
PROMYELOCYTES # BLD MANUAL: 0.02 X10*3/UL
PROMYELOCYTES NFR BLD MANUAL: 4.3 %
PROT SERPL-MCNC: 5 G/DL (ref 6.4–8.2)
PROT UR STRIP.AUTO-MCNC: ABNORMAL MG/DL
RBC # BLD AUTO: 1.79 X10*6/UL (ref 4.5–5.9)
RBC # BLD AUTO: 1.93 X10*6/UL (ref 4.5–5.9)
RBC # BLD AUTO: 2.14 X10*6/UL (ref 4.5–5.9)
RBC # UR STRIP.AUTO: NEGATIVE /UL
RBC #/AREA URNS AUTO: NORMAL /HPF
RBC MORPH BLD: ABNORMAL
SODIUM SERPL-SCNC: 138 MMOL/L (ref 136–145)
SP GR UR STRIP.AUTO: 1.02
TOTAL CELLS COUNTED BLD: 129
TOTAL CELLS COUNTED BLD: 138
TOTAL CELLS COUNTED BLD: 46
URATE SERPL-MCNC: 2.3 MG/DL (ref 4–7.5)
UROBILINOGEN UR STRIP.AUTO-MCNC: NORMAL MG/DL
WBC # BLD AUTO: 0.4 X10*3/UL (ref 4.4–11.3)
WBC # BLD AUTO: 0.8 X10*3/UL (ref 4.4–11.3)
WBC # BLD AUTO: 1.6 X10*3/UL (ref 4.4–11.3)
WBC #/AREA URNS AUTO: NORMAL /HPF

## 2024-02-18 PROCEDURE — 2500000001 HC RX 250 WO HCPCS SELF ADMINISTERED DRUGS (ALT 637 FOR MEDICARE OP)

## 2024-02-18 PROCEDURE — 2500000001 HC RX 250 WO HCPCS SELF ADMINISTERED DRUGS (ALT 637 FOR MEDICARE OP): Performed by: PHYSICIAN ASSISTANT

## 2024-02-18 PROCEDURE — 82248 BILIRUBIN DIRECT: CPT | Performed by: PHYSICIAN ASSISTANT

## 2024-02-18 PROCEDURE — 85007 BL SMEAR W/DIFF WBC COUNT: CPT | Mod: MUE

## 2024-02-18 PROCEDURE — 99233 SBSQ HOSP IP/OBS HIGH 50: CPT | Performed by: INTERNAL MEDICINE

## 2024-02-18 PROCEDURE — 85027 COMPLETE CBC AUTOMATED: CPT | Performed by: PHYSICIAN ASSISTANT

## 2024-02-18 PROCEDURE — 2500000004 HC RX 250 GENERAL PHARMACY W/ HCPCS (ALT 636 FOR OP/ED): Mod: JZ | Performed by: INTERNAL MEDICINE

## 2024-02-18 PROCEDURE — 2500000001 HC RX 250 WO HCPCS SELF ADMINISTERED DRUGS (ALT 637 FOR MEDICARE OP): Performed by: INTERNAL MEDICINE

## 2024-02-18 PROCEDURE — 51702 INSERT TEMP BLADDER CATH: CPT

## 2024-02-18 PROCEDURE — 84100 ASSAY OF PHOSPHORUS: CPT | Performed by: PHYSICIAN ASSISTANT

## 2024-02-18 PROCEDURE — 85007 BL SMEAR W/DIFF WBC COUNT: CPT | Performed by: PHYSICIAN ASSISTANT

## 2024-02-18 PROCEDURE — 80048 BASIC METABOLIC PNL TOTAL CA: CPT | Performed by: PHYSICIAN ASSISTANT

## 2024-02-18 PROCEDURE — 71045 X-RAY EXAM CHEST 1 VIEW: CPT | Performed by: RADIOLOGY

## 2024-02-18 PROCEDURE — 83735 ASSAY OF MAGNESIUM: CPT | Performed by: PHYSICIAN ASSISTANT

## 2024-02-18 PROCEDURE — 81001 URINALYSIS AUTO W/SCOPE: CPT

## 2024-02-18 PROCEDURE — 71045 X-RAY EXAM CHEST 1 VIEW: CPT

## 2024-02-18 PROCEDURE — 2500000002 HC RX 250 W HCPCS SELF ADMINISTERED DRUGS (ALT 637 FOR MEDICARE OP, ALT 636 FOR OP/ED): Performed by: PHYSICIAN ASSISTANT

## 2024-02-18 PROCEDURE — 2500000004 HC RX 250 GENERAL PHARMACY W/ HCPCS (ALT 636 FOR OP/ED): Performed by: PHYSICIAN ASSISTANT

## 2024-02-18 PROCEDURE — 84550 ASSAY OF BLOOD/URIC ACID: CPT | Performed by: PHYSICIAN ASSISTANT

## 2024-02-18 PROCEDURE — 1170000001 HC PRIVATE ONCOLOGY ROOM DAILY

## 2024-02-18 PROCEDURE — 2500000004 HC RX 250 GENERAL PHARMACY W/ HCPCS (ALT 636 FOR OP/ED)

## 2024-02-18 PROCEDURE — 85027 COMPLETE CBC AUTOMATED: CPT

## 2024-02-18 PROCEDURE — 83605 ASSAY OF LACTIC ACID: CPT

## 2024-02-18 RX ORDER — POTASSIUM CHLORIDE 29.8 MG/ML
40 INJECTION INTRAVENOUS ONCE
Status: COMPLETED | OUTPATIENT
Start: 2024-02-18 | End: 2024-02-18

## 2024-02-18 RX ORDER — FUROSEMIDE 10 MG/ML
40 INJECTION INTRAMUSCULAR; INTRAVENOUS ONCE
Status: COMPLETED | OUTPATIENT
Start: 2024-02-18 | End: 2024-02-18

## 2024-02-18 RX ADMIN — GABAPENTIN 300 MG: 300 CAPSULE ORAL at 13:52

## 2024-02-18 RX ADMIN — ACYCLOVIR 400 MG: 400 TABLET ORAL at 09:47

## 2024-02-18 RX ADMIN — DULOXETINE HYDROCHLORIDE 30 MG: 30 CAPSULE, DELAYED RELEASE ORAL at 22:35

## 2024-02-18 RX ADMIN — PIPERACILLIN SODIUM AND TAZOBACTAM SODIUM 4.5 G: 4; .5 INJECTION, SOLUTION INTRAVENOUS at 22:34

## 2024-02-18 RX ADMIN — HALOPERIDOL 0.5 MG: 0.5 TABLET ORAL at 12:14

## 2024-02-18 RX ADMIN — LORAZEPAM 0.5 MG: 0.5 TABLET ORAL at 12:14

## 2024-02-18 RX ADMIN — PANTOPRAZOLE SODIUM 40 MG: 40 TABLET, DELAYED RELEASE ORAL at 07:43

## 2024-02-18 RX ADMIN — LOPERAMIDE HYDROCHLORIDE 2 MG: 2 CAPSULE ORAL at 04:59

## 2024-02-18 RX ADMIN — FLUCONAZOLE 400 MG: 200 TABLET ORAL at 09:47

## 2024-02-18 RX ADMIN — GABAPENTIN 600 MG: 300 CAPSULE ORAL at 22:58

## 2024-02-18 RX ADMIN — OXYCODONE HYDROCHLORIDE 5 MG: 5 TABLET ORAL at 22:37

## 2024-02-18 RX ADMIN — OXYCODONE HYDROCHLORIDE 5 MG: 5 TABLET ORAL at 07:43

## 2024-02-18 RX ADMIN — PIPERACILLIN SODIUM AND TAZOBACTAM SODIUM 4.5 G: 4; .5 INJECTION, SOLUTION INTRAVENOUS at 09:47

## 2024-02-18 RX ADMIN — CAPSAICIN 1 APPLICATION: 0.25 CREAM TOPICAL at 04:54

## 2024-02-18 RX ADMIN — FUROSEMIDE 40 MG: 10 INJECTION, SOLUTION INTRAMUSCULAR; INTRAVENOUS at 12:14

## 2024-02-18 RX ADMIN — HALOPERIDOL 0.5 MG: 0.5 TABLET ORAL at 07:44

## 2024-02-18 RX ADMIN — PIPERACILLIN SODIUM AND TAZOBACTAM SODIUM 4.5 G: 4; .5 INJECTION, SOLUTION INTRAVENOUS at 16:41

## 2024-02-18 RX ADMIN — FILGRASTIM-SNDZ 480 MCG: 480 INJECTION, SOLUTION INTRAVENOUS; SUBCUTANEOUS at 13:52

## 2024-02-18 RX ADMIN — POTASSIUM CHLORIDE 40 MEQ: 29.8 INJECTION, SOLUTION INTRAVENOUS at 11:01

## 2024-02-18 RX ADMIN — PIPERACILLIN SODIUM AND TAZOBACTAM SODIUM 4.5 G: 4; .5 INJECTION, SOLUTION INTRAVENOUS at 04:33

## 2024-02-18 RX ADMIN — LORAZEPAM 0.5 MG: 0.5 TABLET ORAL at 17:41

## 2024-02-18 RX ADMIN — HALOPERIDOL 0.5 MG: 0.5 TABLET ORAL at 17:41

## 2024-02-18 RX ADMIN — Medication 10 MG: at 22:34

## 2024-02-18 RX ADMIN — GABAPENTIN 300 MG: 300 CAPSULE ORAL at 09:47

## 2024-02-18 RX ADMIN — LORAZEPAM 0.5 MG: 0.5 TABLET ORAL at 07:43

## 2024-02-18 RX ADMIN — SODIUM CHLORIDE 75 ML/HR: 9 INJECTION, SOLUTION INTRAVENOUS at 07:45

## 2024-02-18 RX ADMIN — ACYCLOVIR 400 MG: 400 TABLET ORAL at 22:34

## 2024-02-18 ASSESSMENT — PAIN SCALES - GENERAL
PAINLEVEL_OUTOF10: 5 - MODERATE PAIN
PAINLEVEL_OUTOF10: 5 - MODERATE PAIN
PAINLEVEL_OUTOF10: 3
PAINLEVEL_OUTOF10: 5 - MODERATE PAIN

## 2024-02-18 ASSESSMENT — PAIN DESCRIPTION - LOCATION: LOCATION: SHOULDER

## 2024-02-18 ASSESSMENT — PAIN - FUNCTIONAL ASSESSMENT
PAIN_FUNCTIONAL_ASSESSMENT: 0-10

## 2024-02-18 ASSESSMENT — PAIN DESCRIPTION - DESCRIPTORS
DESCRIPTORS: ACHING;DULL
DESCRIPTORS: ACHING;SORE

## 2024-02-18 ASSESSMENT — PAIN DESCRIPTION - ORIENTATION: ORIENTATION: RIGHT

## 2024-02-18 NOTE — PROGRESS NOTES
David Ferreira is a 78 y.o. male on day 13 of admission presenting with IgG multiple myeloma (CMS/HCC).    Subjective     Afebrile, no overnight events.      This morning (02/18/24), he reports overall feeling much improved denying any further nausea or vomiting and stated that his abdominal pain is improved. Remainder of ROS negative.     Objective     Physical Exam  Constitutional:       General: He is not in acute distress.  HENT:      Head: Normocephalic and atraumatic.      Mouth/Throat:      Mouth: Mucous membranes are moist.      Pharynx: No oropharyngeal exudate or posterior oropharyngeal erythema.        Comments: Area, about 1/2 dime-sized of purplish discoloration on the tip of the tongue; per patient this is chronic, intermittent. There are other purplish, non-ulcerated lesions of the same size, one on the L interior angle and another on the R buccal mucosa, without evidence of bleeding.    Eyes:      Extraocular Movements: Extraocular movements intact.      Pupils: Pupils are equal, round, and reactive to light.   Cardiovascular:      Rate and Rhythm: Normal rate and regular rhythm.      Heart sounds: No murmur heard.     No friction rub. No gallop.   Pulmonary:      Effort: No respiratory distress.      Breath sounds: Normal breath sounds. No wheezing or rhonchi.   Abdominal:      General: Bowel sounds are normal.      Palpations: Abdomen is soft.      Tenderness: There is no abdominal tenderness. There is no rebound.      Hernia: No hernia is present.          Comments: LLQ nontender with palpation but rebound tenderness present.    Musculoskeletal:         General: Normal range of motion.      Right hand: Swelling (trace non-pittting edema of the R hand) present.      Right lower leg: No edema.      Left lower leg: No edema.      Comments: 2+ BLE edema and 1+ BUE edema   Skin:     General: Skin is warm and dry.   Neurological:      Mental Status: He is alert and oriented to person, place, and time.       Motor: No weakness.   Psychiatric:         Mood and Affect: Mood normal.         Behavior: Behavior normal.       Assessment/Plan   Principal Problem:    IgG multiple myeloma (CMS/HCC)  Active Problems:    Peripheral neuropathy    Stem cells transplant status (CMS/HCC)    Chemotherapy induced nausea and vomiting    David Ferreira is a 78 y.o. male with IgG [place] [facility] in [place] admitted for his Autologous SCT with Sonja 140 (T=0, 2/07/24)     T+11 (02/18/24)  from Autologous SCT prepped with Melphalan 140mg/m2    Active issues today (02/18/24):  # LLQ tenderness in the setting of neutropenia without signs of sepsis. CT pending. BC pending. Started Zosyn on 2/16, dose increased to 4.5 today for coverage of diverticulitis seen on CT.   # Chemotherapy induced nausea, worsening. Switched antiemetic regimen.      ONC  # IgG Kappa Multiple Myeloma  - Currently in [place]  - Autologous SCT (2/07/24) prepped with Melphalan 140mg/m2     HEME  # Pancytopenia  :: Due to disease and chemotherapy   - Transfuse for Hgb < 7 g/dL, platelets < 10 k/uL  - Neupogen (2/12- )  - Platelets for AM count of 8 k/uL (2/16/24)      ID  # LLQ tenderness in the setting of neutropenia without signs of sepsis (2/16/24)  - No fevers, tachycardia, or hypotension  - Bcx x 2 (2/16/24): pending  - Checking Cdiff (2/17)  - CT abdomen pelvis w contrast (2/16/24): Diverticulitis of the sigmoid colon.   - Continue piperacillin-tazobactam 3.375 g Q6H (2/16- ), dose increased to 4.5 on 2/17.  # Prophylaxis  - ACV, Fluconazole     FEN/GI  - Admit wt: 73.9 kg  Current wt: 75.9  kg (02/18/24)  - IVF: NS @ 75 mL/hr (2/16/24)  # Chemotherapy induced nausea and vomiting  - Start haloperidol 0.5 mg + lorazepam 0.5 mg PO Q6H for delayed CINV, per Dr. Freire  - Qtc 404 ms (2/16/24)  - Ondansetron 8 mg PO TID moved to PRN (2/16- )  - Continue prochlorperazine PRN  - Could consider olanzapine 2.5 mg at bedtime if not resolved later  # PUD prophy: Protonix  # Protein  malnutrition  - Supplements ordered TID with meals  #FVO  - 3kg weight gain between 2/17 and 2/18, edematous on exam with new dry cough.  - Lasix 40mg IV  - Stopping IVF  - CXR portable ordered, radiologist read pending.     CV  # Functional surveillance  - ECHO (11/7/23) with EF of 65%     MSK  # R Rotator cuff tear  - Continue Capsaicin and oxycodone as needed  #Abnormal CT finding  - CT A/P 2/17: diffuse thickening and edema surrounding bilateral psoas  muscles, gluteal muscles, and quadratus femoris muscles. There is  diffuse periarticular soft tissue thickening likely related to  synovial/capsular thickening (right > left). There is also diffuse  soft tissue thickening along the posterior aspects of the greater  trochanters (right > left); for example, there is of 4.4 cm x 4 cm  area of heterogeneous soft tissue posterior to the right greater  trochanter with scattered foci of calcification that could be related  to calcium hydroxy appetite deposition disease.   - Radiology recommending further assessment with MRI pelvis w/ and w/o contrast.      NEURO:  # Peripheral neuropathy B/l 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 Contreras     Patient seen examined and discussed with Dr. Tani Stern PA-C

## 2024-02-19 LAB
ALBUMIN SERPL BCP-MCNC: 2.7 G/DL (ref 3.4–5)
ALP SERPL-CCNC: 66 U/L (ref 33–136)
ALT SERPL W P-5'-P-CCNC: 10 U/L (ref 10–52)
ANION GAP SERPL CALC-SCNC: 8 MMOL/L (ref 10–20)
APTT PPP: 26 SECONDS (ref 27–38)
AST SERPL W P-5'-P-CCNC: 10 U/L (ref 9–39)
BASOPHILS # BLD MANUAL: 0 X10*3/UL (ref 0–0.1)
BASOPHILS NFR BLD MANUAL: 0 %
BILIRUB DIRECT SERPL-MCNC: 0.1 MG/DL (ref 0–0.3)
BILIRUB SERPL-MCNC: 0.5 MG/DL (ref 0–1.2)
BLOOD EXPIRATION DATE: NORMAL
BUN SERPL-MCNC: 11 MG/DL (ref 6–23)
CALCIUM SERPL-MCNC: 9 MG/DL (ref 8.6–10.6)
CHLORIDE SERPL-SCNC: 105 MMOL/L (ref 98–107)
CO2 SERPL-SCNC: 27 MMOL/L (ref 21–32)
CREAT SERPL-MCNC: 1.09 MG/DL (ref 0.5–1.3)
DISPENSE STATUS: NORMAL
EGFRCR SERPLBLD CKD-EPI 2021: 70 ML/MIN/1.73M*2
EOSINOPHIL # BLD MANUAL: 0 X10*3/UL (ref 0–0.4)
EOSINOPHIL NFR BLD MANUAL: 0 %
ERYTHROCYTE [DISTWIDTH] IN BLOOD BY AUTOMATED COUNT: 14.2 % (ref 11.5–14.5)
GLUCOSE SERPL-MCNC: 92 MG/DL (ref 74–99)
HCT VFR BLD AUTO: 20.6 % (ref 41–52)
HEMOCCULT SP1 STL QL: NEGATIVE
HGB BLD-MCNC: 6.9 G/DL (ref 13.5–17.5)
IMM GRANULOCYTES # BLD AUTO: 0.04 X10*3/UL (ref 0–0.5)
IMM GRANULOCYTES NFR BLD AUTO: 1.7 % (ref 0–0.9)
INR PPP: 1.6 (ref 0.9–1.1)
LYMPHOCYTES # BLD MANUAL: 0.04 X10*3/UL (ref 0.8–3)
LYMPHOCYTES NFR BLD MANUAL: 1.6 %
MAGNESIUM SERPL-MCNC: 1.84 MG/DL (ref 1.6–2.4)
MCH RBC QN AUTO: 35.2 PG (ref 26–34)
MCHC RBC AUTO-ENTMCNC: 33.5 G/DL (ref 32–36)
MCV RBC AUTO: 105 FL (ref 80–100)
MONOCYTES # BLD MANUAL: 0.12 X10*3/UL (ref 0.05–0.8)
MONOCYTES NFR BLD MANUAL: 4.9 %
NEUTS SEG # BLD MANUAL: 2.24 X10*3/UL (ref 1.6–5)
NEUTS SEG NFR BLD MANUAL: 93.5 %
NRBC BLD-RTO: 0 /100 WBCS (ref 0–0)
PHOSPHATE SERPL-MCNC: 2.7 MG/DL (ref 2.5–4.9)
PLATELET # BLD AUTO: 10 X10*3/UL (ref 150–450)
POTASSIUM SERPL-SCNC: 3.2 MMOL/L (ref 3.5–5.3)
PRODUCT BLOOD TYPE: 7300
PRODUCT CODE: NORMAL
PROT SERPL-MCNC: 4.6 G/DL (ref 6.4–8.2)
PROTHROMBIN TIME: 17.9 SECONDS (ref 9.8–12.8)
RBC # BLD AUTO: 1.96 X10*6/UL (ref 4.5–5.9)
RBC MORPH BLD: ABNORMAL
SODIUM SERPL-SCNC: 137 MMOL/L (ref 136–145)
TOTAL CELLS COUNTED BLD: 123
UNIT ABO: NORMAL
UNIT NUMBER: NORMAL
UNIT RH: NORMAL
UNIT VOLUME: 250
URATE SERPL-MCNC: 2.6 MG/DL (ref 4–7.5)
WBC # BLD AUTO: 2.4 X10*3/UL (ref 4.4–11.3)

## 2024-02-19 PROCEDURE — 36430 TRANSFUSION BLD/BLD COMPNT: CPT

## 2024-02-19 PROCEDURE — 2500000001 HC RX 250 WO HCPCS SELF ADMINISTERED DRUGS (ALT 637 FOR MEDICARE OP): Performed by: INTERNAL MEDICINE

## 2024-02-19 PROCEDURE — 1170000001 HC PRIVATE ONCOLOGY ROOM DAILY

## 2024-02-19 PROCEDURE — 85610 PROTHROMBIN TIME: CPT | Performed by: PHYSICIAN ASSISTANT

## 2024-02-19 PROCEDURE — 82270 OCCULT BLOOD FECES: CPT

## 2024-02-19 PROCEDURE — 2500000004 HC RX 250 GENERAL PHARMACY W/ HCPCS (ALT 636 FOR OP/ED)

## 2024-02-19 PROCEDURE — P9073 PLATELETS PHERESIS PATH REDU: HCPCS

## 2024-02-19 PROCEDURE — 2500000004 HC RX 250 GENERAL PHARMACY W/ HCPCS (ALT 636 FOR OP/ED): Mod: JZ | Performed by: INTERNAL MEDICINE

## 2024-02-19 PROCEDURE — 2500000002 HC RX 250 W HCPCS SELF ADMINISTERED DRUGS (ALT 637 FOR MEDICARE OP, ALT 636 FOR OP/ED)

## 2024-02-19 PROCEDURE — 84075 ASSAY ALKALINE PHOSPHATASE: CPT | Performed by: PHYSICIAN ASSISTANT

## 2024-02-19 PROCEDURE — 2500000001 HC RX 250 WO HCPCS SELF ADMINISTERED DRUGS (ALT 637 FOR MEDICARE OP)

## 2024-02-19 PROCEDURE — 2500000001 HC RX 250 WO HCPCS SELF ADMINISTERED DRUGS (ALT 637 FOR MEDICARE OP): Performed by: PHYSICIAN ASSISTANT

## 2024-02-19 PROCEDURE — 85027 COMPLETE CBC AUTOMATED: CPT | Performed by: PHYSICIAN ASSISTANT

## 2024-02-19 PROCEDURE — 84550 ASSAY OF BLOOD/URIC ACID: CPT | Performed by: PHYSICIAN ASSISTANT

## 2024-02-19 PROCEDURE — P9040 RBC LEUKOREDUCED IRRADIATED: HCPCS

## 2024-02-19 PROCEDURE — 83735 ASSAY OF MAGNESIUM: CPT | Performed by: PHYSICIAN ASSISTANT

## 2024-02-19 PROCEDURE — 85007 BL SMEAR W/DIFF WBC COUNT: CPT | Performed by: PHYSICIAN ASSISTANT

## 2024-02-19 PROCEDURE — 84100 ASSAY OF PHOSPHORUS: CPT | Performed by: PHYSICIAN ASSISTANT

## 2024-02-19 PROCEDURE — 2500000002 HC RX 250 W HCPCS SELF ADMINISTERED DRUGS (ALT 637 FOR MEDICARE OP, ALT 636 FOR OP/ED): Performed by: PHYSICIAN ASSISTANT

## 2024-02-19 RX ORDER — SODIUM CHLORIDE 9 MG/ML
50 INJECTION, SOLUTION INTRAVENOUS CONTINUOUS
Status: DISCONTINUED | OUTPATIENT
Start: 2024-02-19 | End: 2024-02-21

## 2024-02-19 RX ORDER — POTASSIUM CHLORIDE 20 MEQ/1
40 TABLET, EXTENDED RELEASE ORAL ONCE
Status: COMPLETED | OUTPATIENT
Start: 2024-02-19 | End: 2024-02-19

## 2024-02-19 RX ORDER — LANOLIN ALCOHOL/MO/W.PET/CERES
400 CREAM (GRAM) TOPICAL ONCE
Status: COMPLETED | OUTPATIENT
Start: 2024-02-19 | End: 2024-02-19

## 2024-02-19 RX ADMIN — GABAPENTIN 600 MG: 300 CAPSULE ORAL at 21:17

## 2024-02-19 RX ADMIN — LORAZEPAM 0.5 MG: 0.5 TABLET ORAL at 12:04

## 2024-02-19 RX ADMIN — FILGRASTIM-SNDZ 480 MCG: 480 INJECTION, SOLUTION INTRAVENOUS; SUBCUTANEOUS at 13:46

## 2024-02-19 RX ADMIN — HALOPERIDOL 0.5 MG: 0.5 TABLET ORAL at 00:17

## 2024-02-19 RX ADMIN — PHYTONADIONE 5 MG: 10 INJECTION, EMULSION INTRAMUSCULAR; INTRAVENOUS; SUBCUTANEOUS at 17:45

## 2024-02-19 RX ADMIN — ACYCLOVIR 400 MG: 400 TABLET ORAL at 21:17

## 2024-02-19 RX ADMIN — HALOPERIDOL 0.5 MG: 0.5 TABLET ORAL at 06:54

## 2024-02-19 RX ADMIN — Medication 400 MG: at 08:28

## 2024-02-19 RX ADMIN — PIPERACILLIN SODIUM AND TAZOBACTAM SODIUM 4.5 G: 4; .5 INJECTION, SOLUTION INTRAVENOUS at 04:52

## 2024-02-19 RX ADMIN — LORAZEPAM 0.5 MG: 0.5 TABLET ORAL at 06:54

## 2024-02-19 RX ADMIN — ACYCLOVIR 400 MG: 400 TABLET ORAL at 08:28

## 2024-02-19 RX ADMIN — POTASSIUM CHLORIDE 40 MEQ: 1500 TABLET, EXTENDED RELEASE ORAL at 08:27

## 2024-02-19 RX ADMIN — GABAPENTIN 300 MG: 300 CAPSULE ORAL at 13:46

## 2024-02-19 RX ADMIN — PIPERACILLIN SODIUM AND TAZOBACTAM SODIUM 4.5 G: 4; .5 INJECTION, SOLUTION INTRAVENOUS at 16:48

## 2024-02-19 RX ADMIN — PIPERACILLIN SODIUM AND TAZOBACTAM SODIUM 4.5 G: 4; .5 INJECTION, SOLUTION INTRAVENOUS at 11:22

## 2024-02-19 RX ADMIN — HALOPERIDOL 0.5 MG: 0.5 TABLET ORAL at 12:04

## 2024-02-19 RX ADMIN — GABAPENTIN 300 MG: 300 CAPSULE ORAL at 08:28

## 2024-02-19 RX ADMIN — FLUCONAZOLE 400 MG: 200 TABLET ORAL at 08:28

## 2024-02-19 RX ADMIN — PIPERACILLIN SODIUM AND TAZOBACTAM SODIUM 4.5 G: 4; .5 INJECTION, SOLUTION INTRAVENOUS at 22:06

## 2024-02-19 RX ADMIN — SODIUM CHLORIDE 50 ML/HR: 9 INJECTION, SOLUTION INTRAVENOUS at 16:47

## 2024-02-19 RX ADMIN — DULOXETINE HYDROCHLORIDE 30 MG: 30 CAPSULE, DELAYED RELEASE ORAL at 21:17

## 2024-02-19 RX ADMIN — LOPERAMIDE HYDROCHLORIDE 2 MG: 2 CAPSULE ORAL at 19:59

## 2024-02-19 RX ADMIN — LORAZEPAM 0.5 MG: 0.5 TABLET ORAL at 00:17

## 2024-02-19 RX ADMIN — PANTOPRAZOLE SODIUM 40 MG: 40 TABLET, DELAYED RELEASE ORAL at 06:54

## 2024-02-19 ASSESSMENT — COGNITIVE AND FUNCTIONAL STATUS - GENERAL
EATING MEALS: A LITTLE
WALKING IN HOSPITAL ROOM: A LOT
DRESSING REGULAR LOWER BODY CLOTHING: A LITTLE
PERSONAL GROOMING: A LITTLE
TOILETING: A LOT
CLIMB 3 TO 5 STEPS WITH RAILING: A LOT
DRESSING REGULAR UPPER BODY CLOTHING: A LITTLE
DRESSING REGULAR LOWER BODY CLOTHING: A LITTLE
STANDING UP FROM CHAIR USING ARMS: A LITTLE
DRESSING REGULAR UPPER BODY CLOTHING: A LITTLE
EATING MEALS: A LITTLE
HELP NEEDED FOR BATHING: A LITTLE
STANDING UP FROM CHAIR USING ARMS: A LITTLE
DAILY ACTIVITIY SCORE: 17
CLIMB 3 TO 5 STEPS WITH RAILING: A LOT
TURNING FROM BACK TO SIDE WHILE IN FLAT BAD: A LITTLE
PERSONAL GROOMING: A LITTLE
MOVING TO AND FROM BED TO CHAIR: A LITTLE
TOILETING: A LOT
MOVING TO AND FROM BED TO CHAIR: A LOT
HELP NEEDED FOR BATHING: A LITTLE
MOBILITY SCORE: 16
TURNING FROM BACK TO SIDE WHILE IN FLAT BAD: A LITTLE
WALKING IN HOSPITAL ROOM: A LOT
MOBILITY SCORE: 17
DAILY ACTIVITIY SCORE: 17

## 2024-02-19 ASSESSMENT — PAIN - FUNCTIONAL ASSESSMENT: PAIN_FUNCTIONAL_ASSESSMENT: 0-10

## 2024-02-19 ASSESSMENT — PAIN SCALES - GENERAL
PAINLEVEL_OUTOF10: 0 - NO PAIN
PAINLEVEL_OUTOF10: 0 - NO PAIN

## 2024-02-19 NOTE — CARE PLAN
The clinical goals for the shift include Patient will remain free from falls      Problem: Fall/Injury  Goal: Not fall by end of shift  Outcome: Progressing  Goal: Be free from injury by end of the shift  Outcome: Progressing  Goal: Verbalize understanding of personal risk factors for fall in the hospital  Outcome: Progressing  Goal: Verbalize understanding of risk factor reduction measures to prevent injury from fall in the home  Outcome: Progressing  Goal: Use assistive devices by end of the shift  Outcome: Progressing  Goal: Pace activities to prevent fatigue by end of the shift  Outcome: Progressing     Problem: Pain - Adult  Goal: Verbalizes/displays adequate comfort level or baseline comfort level  Outcome: Progressing     Problem: Safety - Adult  Goal: Free from fall injury  Outcome: Progressing     Problem: Discharge Planning  Goal: Discharge to home or other facility with appropriate resources  Outcome: Progressing     Problem: Chronic Conditions and Co-morbidities  Goal: Patient's chronic conditions and co-morbidity symptoms are monitored and maintained or improved  Outcome: Progressing

## 2024-02-19 NOTE — PROGRESS NOTES
David Ferreira is a 78 y.o. male on day 14 of admission presenting with IgG multiple myeloma (CMS/HCC).    Subjective     Afebrile, no overnight events.      This morning (02/19/24), David's family members express concerns over his overall decreased energy, difficulty with ambulation, and lethargy for the past 2 days. Assessment negative for face neurologic deficits. Likely 2/2 to anemia and recent AutoSCT. Patient denies any acute complaints, struggles to keep eyes open during exam but rouses easily.     Objective     Physical Exam  Constitutional:       General: He is not in acute distress.  HENT:      Head: Normocephalic and atraumatic.      Mouth/Throat:      Mouth: Mucous membranes are moist.      Pharynx: No oropharyngeal exudate or posterior oropharyngeal erythema.        Comments: Area, about 1/2 dime-sized of purplish discoloration on the tip of the tongue; per patient this is chronic, intermittent. There are other purplish, non-ulcerated lesions of the same size, one on the L interior angle and another on the R buccal mucosa, without evidence of bleeding.    Eyes:      Extraocular Movements: Extraocular movements intact.      Pupils: Pupils are equal, round, and reactive to light.   Cardiovascular:      Rate and Rhythm: Normal rate and regular rhythm.      Heart sounds: No murmur heard.     No friction rub. No gallop.   Pulmonary:      Effort: No respiratory distress.      Breath sounds: Normal breath sounds. No wheezing or rhonchi.   Abdominal:      General: Bowel sounds are normal.      Palpations: Abdomen is soft.      Tenderness: There is no abdominal tenderness. There is no rebound.      Hernia: No hernia is present.          Comments: LLQ nontender with palpation but rebound tenderness present.    Musculoskeletal:         General: Normal range of motion.      Right hand: Swelling (trace non-pittting edema of the R hand) present.      Right lower leg: No edema.      Left lower leg: No edema.       Comments: 2+ BLE edema and 1+ BUE edema   Skin:     General: Skin is warm and dry.   Neurological:      General: No focal deficit present.      Mental Status: He is alert and oriented to person, place, and time.      Cranial Nerves: No cranial nerve deficit.      Sensory: No sensory deficit.      Motor: No weakness.      Coordination: Coordination normal.   Psychiatric:         Mood and Affect: Mood normal.         Behavior: Behavior normal.       Assessment/Plan   Principal Problem:    IgG multiple myeloma (CMS/HCC)  Active Problems:    Peripheral neuropathy    Stem cells transplant status (CMS/HCC)    Chemotherapy induced nausea and vomiting    David Ferreira is a 78 y.o. male with IgG [place] Myeloma in [place] admitted for his Autologous SCT with Sonja 140 (T=0, 2/07/24)     T+12 (02/19/24)  from Autologous SCT prepped with Melphalan 140mg/m2    Active issues today (02/19/24):  # LLQ tenderness in the setting of neutropenia without signs of sepsis. CT pending. BC pending. Started Zosyn on 2/16, dose increased to 4.5 today for coverage of diverticulitis seen on CT.   # Chemotherapy induced nausea, worsening. Switched antiemetic regimen.   #Fatigue and weakness since 2/17, liekly 2/2 to anemia and AutoSCT.      ONC  # IgG Kappa Multiple Myeloma  - Currently in [place]  - Autologous SCT (2/07/24) prepped with Melphalan 140mg/m2     HEME  # Pancytopenia  :: Due to disease and chemotherapy   - Transfuse for Hgb < 7 g/dL, platelets < 10 k/uL  - Neupogen (2/12- )  - Platelets for AM count of 8 k/uL (2/16/24)      ID  # LLQ tenderness in the setting of neutropenia without signs of sepsis (2/16/24)  - No fevers, tachycardia, or hypotension  - Bcx x 2 (2/16/24): pending  - Checking Cdiff (2/17)  - CT abdomen pelvis w contrast (2/16/24): Diverticulitis of the sigmoid colon.   - Continue piperacillin-tazobactam 3.375 g Q6H (2/16- ), dose increased to 4.5 on 2/17.  # Prophylaxis  - ACV, Fluconazole     FEN/GI  - Admit wt: 73.9 kg   Current wt: 75.1  kg (02/19/24)  - IVF: NS @ 75 mL/hr (2/16/24)  # Chemotherapy induced nausea and vomiting  - Start haloperidol 0.5 mg + lorazepam 0.5 mg PO Q6H for delayed CINV, per Dr. Freire. Stopped 2/19 with fatigue/weakness.   - Qtc 404 ms (2/16/24)  - Ondansetron 8 mg PO TID moved to PRN (2/16- )  - Continue prochlorperazine PRN  - Could consider olanzapine 2.5 mg at bedtime if not resolved later  # PUD prophy: Protonix  # Protein malnutrition  - Supplements ordered TID with meals  #FVO  - 3kg weight gain between 2/17 and 2/18, edematous on exam with new dry cough.  - Lasix 40mg IV (2/18), Net -1L on 2/18  - Gentle IVF  - CXR portable (2/18) showing mild pulm edema.     CV  # Functional surveillance  - ECHO (11/7/23) with EF of 65%     MSK  # R Rotator cuff tear  - Continue Capsaicin and oxycodone as needed  #Abnormal CT finding  - CT A/P 2/17: diffuse thickening and edema surrounding bilateral psoas  muscles, gluteal muscles, and quadratus femoris muscles. There is  diffuse periarticular soft tissue thickening likely related to  synovial/capsular thickening (right > left). There is also diffuse  soft tissue thickening along the posterior aspects of the greater  trochanters (right > left); for example, there is of 4.4 cm x 4 cm  area of heterogeneous soft tissue posterior to the right greater  trochanter with scattered foci of calcification that could be related  to calcium hydroxy appetite deposition disease.   - Radiology recommending further assessment with MRI pelvis w/ and w/o contrast.      NEURO:  # Peripheral neuropathy B/l 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 Contreras     Patient seen examined and discussed with Dr. Tani Stern PA-C

## 2024-02-19 NOTE — CARE PLAN
The clinical goals for the shift include patient will remain free fromn falls throughout the shift    Patient remains lethargic and forgetful.  Blood pressure has dropped over the past 24 hours.  RN administered platelets and Vitamin K per orders.  Patient will need to have blood transfused tonight once blood bank receives report from the [place]. Patient had multiple loose/soft bowel movements today.  Mendoza remains in place.        Problem: Fall/Injury  Goal: Not fall by end of shift  Outcome: Progressing  Goal: Be free from injury by end of the shift  Outcome: Progressing  Goal: Verbalize understanding of personal risk factors for fall in the hospital  Outcome: Progressing  Goal: Verbalize understanding of risk factor reduction measures to prevent injury from fall in the home  Outcome: Progressing  Goal: Use assistive devices by end of the shift  Outcome: Progressing  Goal: Pace activities to prevent fatigue by end of the shift  Outcome: Progressing     Problem: Pain - Adult  Goal: Verbalizes/displays adequate comfort level or baseline comfort level  Outcome: Progressing     Problem: Safety - Adult  Goal: Free from fall injury  Outcome: Progressing     Problem: Discharge Planning  Goal: Discharge to home or other facility with appropriate resources  Outcome: Progressing     Problem: Chronic Conditions and Co-morbidities  Goal: Patient's chronic conditions and co-morbidity symptoms are monitored and maintained or improved  Outcome: Progressing

## 2024-02-20 LAB
ANION GAP SERPL CALC-SCNC: 10 MMOL/L (ref 10–20)
BACTERIA BLD CULT: NORMAL
BACTERIA BLD CULT: NORMAL
BASOPHILS # BLD MANUAL: 0 X10*3/UL (ref 0–0.1)
BASOPHILS NFR BLD MANUAL: 0 %
BB ANTIBODY IDENTIFICATION: NORMAL
BB ANTIBODY IDENTIFICATION: NORMAL
BUN SERPL-MCNC: 8 MG/DL (ref 6–23)
CALCIUM SERPL-MCNC: 8.8 MG/DL (ref 8.6–10.6)
CASE #: NORMAL
CASE #: NORMAL
CHLORIDE SERPL-SCNC: 105 MMOL/L (ref 98–107)
CO2 SERPL-SCNC: 25 MMOL/L (ref 21–32)
CREAT SERPL-MCNC: 0.99 MG/DL (ref 0.5–1.3)
EGFRCR SERPLBLD CKD-EPI 2021: 78 ML/MIN/1.73M*2
EOSINOPHIL # BLD MANUAL: 0 X10*3/UL (ref 0–0.4)
EOSINOPHIL NFR BLD MANUAL: 0 %
ERYTHROCYTE [DISTWIDTH] IN BLOOD BY AUTOMATED COUNT: 16.5 % (ref 11.5–14.5)
GLUCOSE SERPL-MCNC: 91 MG/DL (ref 74–99)
HCT VFR BLD AUTO: 22.9 % (ref 41–52)
HGB BLD-MCNC: 7.6 G/DL (ref 13.5–17.5)
IMM GRANULOCYTES # BLD AUTO: 0.24 X10*3/UL (ref 0–0.5)
IMM GRANULOCYTES NFR BLD AUTO: 4.7 % (ref 0–0.9)
LYMPHOCYTES # BLD MANUAL: 0.04 X10*3/UL (ref 0.8–3)
LYMPHOCYTES NFR BLD MANUAL: 0.8 %
MCH RBC QN AUTO: 33 PG (ref 26–34)
MCHC RBC AUTO-ENTMCNC: 33.2 G/DL (ref 32–36)
MCV RBC AUTO: 100 FL (ref 80–100)
MONOCYTES # BLD MANUAL: 0.12 X10*3/UL (ref 0.05–0.8)
MONOCYTES NFR BLD MANUAL: 2.4 %
NEUTROPHILS # BLD MANUAL: 4.9 X10*3/UL (ref 1.6–5.5)
NEUTS BAND # BLD MANUAL: 0.25 X10*3/UL (ref 0–0.5)
NEUTS BAND NFR BLD MANUAL: 4.9 %
NEUTS SEG # BLD MANUAL: 4.65 X10*3/UL (ref 1.6–5)
NEUTS SEG NFR BLD MANUAL: 91.1 %
NRBC BLD-RTO: 0 /100 WBCS (ref 0–0)
PATH REV-IMMUNOHEMATOLOGY-PR30: NORMAL
PHOSPHATE SERPL-MCNC: 2.4 MG/DL (ref 2.5–4.9)
PLATELET # BLD AUTO: 13 X10*3/UL (ref 150–450)
POTASSIUM SERPL-SCNC: 3.1 MMOL/L (ref 3.5–5.3)
RBC # BLD AUTO: 2.3 X10*6/UL (ref 4.5–5.9)
RBC MORPH BLD: ABNORMAL
SODIUM SERPL-SCNC: 137 MMOL/L (ref 136–145)
TOTAL CELLS COUNTED BLD: 123
VARIANT LYMPHS # BLD MANUAL: 0.04 X10*3/UL (ref 0–0.3)
VARIANT LYMPHS NFR BLD: 0.8 %
WBC # BLD AUTO: 5.1 X10*3/UL (ref 4.4–11.3)

## 2024-02-20 PROCEDURE — 84550 ASSAY OF BLOOD/URIC ACID: CPT | Performed by: PHYSICIAN ASSISTANT

## 2024-02-20 PROCEDURE — 85027 COMPLETE CBC AUTOMATED: CPT | Performed by: PHYSICIAN ASSISTANT

## 2024-02-20 PROCEDURE — 2500000001 HC RX 250 WO HCPCS SELF ADMINISTERED DRUGS (ALT 637 FOR MEDICARE OP)

## 2024-02-20 PROCEDURE — 85007 BL SMEAR W/DIFF WBC COUNT: CPT | Performed by: PHYSICIAN ASSISTANT

## 2024-02-20 PROCEDURE — 2500000004 HC RX 250 GENERAL PHARMACY W/ HCPCS (ALT 636 FOR OP/ED)

## 2024-02-20 PROCEDURE — 2500000005 HC RX 250 GENERAL PHARMACY W/O HCPCS: Performed by: PHYSICIAN ASSISTANT

## 2024-02-20 PROCEDURE — 2500000002 HC RX 250 W HCPCS SELF ADMINISTERED DRUGS (ALT 637 FOR MEDICARE OP, ALT 636 FOR OP/ED): Performed by: PHYSICIAN ASSISTANT

## 2024-02-20 PROCEDURE — 83735 ASSAY OF MAGNESIUM: CPT | Performed by: PHYSICIAN ASSISTANT

## 2024-02-20 PROCEDURE — 97530 THERAPEUTIC ACTIVITIES: CPT | Mod: GP

## 2024-02-20 PROCEDURE — 2500000005 HC RX 250 GENERAL PHARMACY W/O HCPCS

## 2024-02-20 PROCEDURE — 2500000004 HC RX 250 GENERAL PHARMACY W/ HCPCS (ALT 636 FOR OP/ED): Performed by: STUDENT IN AN ORGANIZED HEALTH CARE EDUCATION/TRAINING PROGRAM

## 2024-02-20 PROCEDURE — 2500000001 HC RX 250 WO HCPCS SELF ADMINISTERED DRUGS (ALT 637 FOR MEDICARE OP): Performed by: INTERNAL MEDICINE

## 2024-02-20 PROCEDURE — 80053 COMPREHEN METABOLIC PANEL: CPT | Performed by: PHYSICIAN ASSISTANT

## 2024-02-20 PROCEDURE — 97116 GAIT TRAINING THERAPY: CPT | Mod: GP

## 2024-02-20 PROCEDURE — 1170000001 HC PRIVATE ONCOLOGY ROOM DAILY

## 2024-02-20 PROCEDURE — 97110 THERAPEUTIC EXERCISES: CPT | Mod: GP

## 2024-02-20 PROCEDURE — 2500000001 HC RX 250 WO HCPCS SELF ADMINISTERED DRUGS (ALT 637 FOR MEDICARE OP): Performed by: PHYSICIAN ASSISTANT

## 2024-02-20 PROCEDURE — 82248 BILIRUBIN DIRECT: CPT | Performed by: PHYSICIAN ASSISTANT

## 2024-02-20 PROCEDURE — 2500000004 HC RX 250 GENERAL PHARMACY W/ HCPCS (ALT 636 FOR OP/ED): Mod: JZ | Performed by: INTERNAL MEDICINE

## 2024-02-20 PROCEDURE — 84100 ASSAY OF PHOSPHORUS: CPT | Performed by: PHYSICIAN ASSISTANT

## 2024-02-20 RX ORDER — POTASSIUM CHLORIDE 29.8 MG/ML
40 INJECTION INTRAVENOUS ONCE
Status: DISCONTINUED | OUTPATIENT
Start: 2024-02-20 | End: 2024-02-20

## 2024-02-20 RX ORDER — POTASSIUM CHLORIDE 29.8 MG/ML
40 INJECTION INTRAVENOUS ONCE
Status: COMPLETED | OUTPATIENT
Start: 2024-02-20 | End: 2024-02-20

## 2024-02-20 RX ORDER — FUROSEMIDE 10 MG/ML
40 INJECTION INTRAMUSCULAR; INTRAVENOUS ONCE
Status: COMPLETED | OUTPATIENT
Start: 2024-02-20 | End: 2024-02-20

## 2024-02-20 RX ADMIN — POTASSIUM CHLORIDE 40 MEQ: 29.8 INJECTION, SOLUTION INTRAVENOUS at 06:01

## 2024-02-20 RX ADMIN — GABAPENTIN 300 MG: 300 CAPSULE ORAL at 08:21

## 2024-02-20 RX ADMIN — ACYCLOVIR 400 MG: 400 TABLET ORAL at 21:31

## 2024-02-20 RX ADMIN — FUROSEMIDE 40 MG: 10 INJECTION, SOLUTION INTRAVENOUS at 13:59

## 2024-02-20 RX ADMIN — GABAPENTIN 600 MG: 300 CAPSULE ORAL at 21:31

## 2024-02-20 RX ADMIN — POTASSIUM PHOSPHATE, MONOBASIC POTASSIUM PHOSPHATE, DIBASIC 15 MMOL: 224; 236 INJECTION, SOLUTION, CONCENTRATE INTRAVENOUS at 09:33

## 2024-02-20 RX ADMIN — FILGRASTIM-SNDZ 480 MCG: 480 INJECTION, SOLUTION INTRAVENOUS; SUBCUTANEOUS at 13:59

## 2024-02-20 RX ADMIN — PIPERACILLIN SODIUM AND TAZOBACTAM SODIUM 4.5 G: 4; .5 INJECTION, SOLUTION INTRAVENOUS at 16:11

## 2024-02-20 RX ADMIN — DULOXETINE HYDROCHLORIDE 30 MG: 30 CAPSULE, DELAYED RELEASE ORAL at 21:32

## 2024-02-20 RX ADMIN — PANTOPRAZOLE SODIUM 40 MG: 40 TABLET, DELAYED RELEASE ORAL at 06:01

## 2024-02-20 RX ADMIN — LOPERAMIDE HYDROCHLORIDE 2 MG: 2 CAPSULE ORAL at 06:06

## 2024-02-20 RX ADMIN — ACYCLOVIR 400 MG: 400 TABLET ORAL at 08:21

## 2024-02-20 RX ADMIN — OXYCODONE HYDROCHLORIDE 5 MG: 5 TABLET ORAL at 08:21

## 2024-02-20 RX ADMIN — OXYCODONE HYDROCHLORIDE 5 MG: 5 TABLET ORAL at 16:13

## 2024-02-20 RX ADMIN — CAPSAICIN 1 APPLICATION: 0.25 CREAM TOPICAL at 21:31

## 2024-02-20 RX ADMIN — FLUCONAZOLE 400 MG: 200 TABLET ORAL at 08:21

## 2024-02-20 RX ADMIN — GABAPENTIN 300 MG: 300 CAPSULE ORAL at 13:59

## 2024-02-20 RX ADMIN — ONDANSETRON HYDROCHLORIDE 8 MG: 8 TABLET, FILM COATED ORAL at 08:23

## 2024-02-20 RX ADMIN — PIPERACILLIN SODIUM AND TAZOBACTAM SODIUM 4.5 G: 4; .5 INJECTION, SOLUTION INTRAVENOUS at 23:19

## 2024-02-20 RX ADMIN — PIPERACILLIN SODIUM AND TAZOBACTAM SODIUM 4.5 G: 4; .5 INJECTION, SOLUTION INTRAVENOUS at 04:29

## 2024-02-20 RX ADMIN — Medication 10 MG: at 21:32

## 2024-02-20 RX ADMIN — PIPERACILLIN SODIUM AND TAZOBACTAM SODIUM 4.5 G: 4; .5 INJECTION, SOLUTION INTRAVENOUS at 09:36

## 2024-02-20 RX ADMIN — OXYCODONE HYDROCHLORIDE 5 MG: 5 TABLET ORAL at 22:01

## 2024-02-20 ASSESSMENT — PAIN DESCRIPTION - ORIENTATION: ORIENTATION: RIGHT

## 2024-02-20 ASSESSMENT — PAIN - FUNCTIONAL ASSESSMENT
PAIN_FUNCTIONAL_ASSESSMENT: 0-10

## 2024-02-20 ASSESSMENT — COGNITIVE AND FUNCTIONAL STATUS - GENERAL
CLIMB 3 TO 5 STEPS WITH RAILING: A LOT
TURNING FROM BACK TO SIDE WHILE IN FLAT BAD: A LITTLE
STANDING UP FROM CHAIR USING ARMS: A LITTLE
DRESSING REGULAR LOWER BODY CLOTHING: A LITTLE
MOBILITY SCORE: 16
DAILY ACTIVITIY SCORE: 17
PERSONAL GROOMING: A LITTLE
HELP NEEDED FOR BATHING: A LITTLE
WALKING IN HOSPITAL ROOM: A LOT
CLIMB 3 TO 5 STEPS WITH RAILING: TOTAL
MOBILITY SCORE: 16
TURNING FROM BACK TO SIDE WHILE IN FLAT BAD: A LITTLE
TOILETING: A LOT
WALKING IN HOSPITAL ROOM: A LITTLE
MOVING TO AND FROM BED TO CHAIR: A LITTLE
STANDING UP FROM CHAIR USING ARMS: A LITTLE
EATING MEALS: A LITTLE
MOVING FROM LYING ON BACK TO SITTING ON SIDE OF FLAT BED WITH BEDRAILS: A LITTLE
MOVING TO AND FROM BED TO CHAIR: A LOT
DRESSING REGULAR UPPER BODY CLOTHING: A LITTLE

## 2024-02-20 ASSESSMENT — PAIN DESCRIPTION - LOCATION
LOCATION: SHOULDER

## 2024-02-20 ASSESSMENT — PAIN SCALES - GENERAL
PAINLEVEL_OUTOF10: 8
PAINLEVEL_OUTOF10: 7
PAINLEVEL_OUTOF10: 2
PAINLEVEL_OUTOF10: 3
PAINLEVEL_OUTOF10: 7
PAINLEVEL_OUTOF10: 3
PAINLEVEL_OUTOF10: 2

## 2024-02-20 ASSESSMENT — PAIN DESCRIPTION - DESCRIPTORS: DESCRIPTORS: ACHING

## 2024-02-20 NOTE — PROGRESS NOTES
Spiritual Care Visit    Clinical Encounter Type  Visited With: Patient  Routine Visit: Follow-up  Continue Visiting: Yes    Taxonomy  Intended Effects: Aligning care plan with patient's values, Build relationship of care and support, Demonstrate caring and concern, Idalia affirmation  Methods: Encourage self reflection, Encourage sharing of feelings, Explore idalia and values, Explore nature of God, Explore presence of God, Offer support  Interventions: Acknowledge current situation, Active listening, Discuss coping mechanisms with someone, [place]     reintroduced self and role to patient David Ferreira. Patient gave permission for medical student observing  to visit, as well. Patient shared that he has been feeling side effects from his transplant as expected, and expressed his idalia has continued to support him through admission. Patient expressed feeling discouraged sometimes but thinking about stories from the Bible and identifying ways God has been present to him before provides comfort.  provided support through reflective listening, affirmation of idalia expressions, supportive conversation, and prayer. Patient was appreciative of visit and did not have any further needs at this time. Spiritual Care remains available as needed/requested.    Rev. Carmen Hogue MDiv, BCC

## 2024-02-20 NOTE — PROGRESS NOTES
David Ferreira is a 78 y.o. male on day 15 of admission presenting with IgG multiple myeloma (CMS/HCC).    Subjective     This morning, he reports feeling much better, resolved abdominal pain and improved nausea. 3 small episodes of emesis this morning, now resolved. He denies fever, chills, headache, vision changes, CP, SOB, cough, n/v/c. Ongoing diarrhea stable. No bleeding, bruising, rash.    Objective     Physical Exam  Constitutional:       General: He is not in acute distress.  HENT:      Head: Normocephalic and atraumatic.      Mouth/Throat:      Mouth: Mucous membranes are moist.      Pharynx: No oropharyngeal exudate or posterior oropharyngeal erythema.        Comments: Area, about 1/2 dime-sized of purplish discoloration on the tip of the tongue; per patient this is chronic, intermittent. There are other purplish, non-ulcerated lesions of the same size, one on the L interior angle and another on the R buccal mucosa, without evidence of bleeding.    Eyes:      Extraocular Movements: Extraocular movements intact.      Pupils: Pupils are equal, round, and reactive to light.   Cardiovascular:      Rate and Rhythm: Normal rate and regular rhythm.      Heart sounds: No murmur heard.     No friction rub. No gallop.   Pulmonary:      Effort: No respiratory distress.      Breath sounds: Normal breath sounds. No wheezing or rhonchi.   Abdominal:      General: Bowel sounds are normal.      Palpations: Abdomen is soft.      Tenderness: There is no abdominal tenderness. There is no rebound.      Hernia: No hernia is present.          Comments: LLQ nontender with palpation but rebound tenderness present.    Musculoskeletal:         General: Normal range of motion.      Right hand: Swelling (trace non-pittting edema of the R hand) present.      Right lower leg: No edema.      Left lower leg: No edema.      Comments: 2+ BLE edema and 1+ BUE edema   Skin:     General: Skin is warm and dry.   Neurological:      General:  No focal deficit present.      Mental Status: He is alert and oriented to person, place, and time.      Cranial Nerves: No cranial nerve deficit.      Sensory: No sensory deficit.      Motor: No weakness.      Coordination: Coordination normal.   Psychiatric:         Mood and Affect: Mood normal.         Behavior: Behavior normal.       Assessment/Plan   Principal Problem:    IgG multiple myeloma (CMS/HCC)  Active Problems:    Peripheral neuropathy    Stem cells transplant status (CMS/HCC)    Chemotherapy induced nausea and vomiting    David Ferreira is a 78 y.o. male with IgG [place] Myeloma in [place] admitted for his Autologous SCT with Sonja 140 (T=0, 2/07/24)     T+13 (02/20/24)  from Autologous SCT prepped with Melphalan 140mg/m2    Active issues today (02/20/24):  # Diverticulitis, LLQ tenderness in the setting of neutropenia without signs of sepsis. CT pending. BC pending. Started Zosyn on 2/16, dose increased to 4.5 today for coverage of diverticulitis seen on CT.   # Chemotherapy induced nausea, worsening. Switched antiemetic regimen.   #Fatigue and weakness since 2/17, liekly 2/2 to anemia and AutoSCT.      ONC  # IgG Kappa Multiple Myeloma  - Currently in [place]  - Autologous SCT (2/07/24) prepped with Melphalan 140mg/m2    CHEMO:   -Sonja 140 mg/m2, 267.4 mg on T-1     HEME  # Pancytopenia  :: Due to disease and chemotherapy   - Transfuse for Hgb < 7 g/dL, platelets < 10 k/uL  - Neupogen (2/12- )  - Platelets for AM count of 8 k/uL (2/16/24)      ID  # Diverticulitis  # LLQ tenderness in the setting of neutropenia without signs of sepsis (2/16/24)  - No fevers, tachycardia, or hypotension  - Bcx x 2 (2/16/24): negative  - Cdiff (2/19) negative  - CT abdomen pelvis w contrast (2/16/24): Diverticulitis of the sigmoid colon.   - Continue piperacillin-tazobactam 3.375 g Q6H (2/16- ), dose increased to 4.5 on 2/17.  # Prophylaxis  - ACV, Fluconazole     FEN/GI  - Admit wt: 73.9 kg  Current wt: 75.1  kg (02/20/24)  -  IVF: NS @ 75 mL/hr (2/16/24)  # Chemotherapy induced nausea and vomiting  - s/p haloperidol 0.5 mg + lorazepam 0.5 mg PO Q6H for delayed CINV, per Dr. Freire. Stopped 2/19 with fatigue/weakness.   - Qtc 404 ms (2/16/24)  - Ondansetron 8 mg PO TID moved to PRN (2/16- )  - Continue prochlorperazine PRN  - Could consider olanzapine 2.5 mg at bedtime if not resolved later  # PUD prophy: Protonix  # Protein malnutrition  - Supplements ordered TID with meals  #FVO  - 3kg weight gain between 2/17 and 2/18, edematous on exam with dry cough.  - Lasix 40mg IV (2/18, 2/20) w/ significant response  - Gentle IVF  - CXR portable (2/18) showing mild pulm edema.   #diarrhea  -Cdiff negative  -PRN imodium  -Pt requestting wu removal, will attempt this evening s/p lasix    CV  # Functional surveillance  - ECHO (11/7/23) with EF of 65%     MSK  # R Rotator cuff tear  - Continue Capsaicin and oxycodone as needed  #Abnormal CT finding  - CT A/P 2/17: diffuse thickening and edema surrounding bilateral psoas  muscles, gluteal muscles, and quadratus femoris muscles. There is  diffuse periarticular soft tissue thickening likely related to  synovial/capsular thickening (right > left). There is also diffuse  soft tissue thickening along the posterior aspects of the greater  trochanters (right > left); for example, there is of 4.4 cm x 4 cm  area of heterogeneous soft tissue posterior to the right greater  trochanter with scattered foci of calcification that could be related  to calcium hydroxy appetite deposition disease.   - Radiology recommending further assessment with MRI pelvis w/ and w/o contrast.      NEURO:  # Peripheral neuropathy B/l 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 Contreras     Patient seen examined and discussed with Dr. Tani Payan PA-C

## 2024-02-20 NOTE — PROGRESS NOTES
Physical Therapy    Physical Therapy Treatment    Patient Name: David Ferreira  MRN: [id number]  Today's Date: 2/20/2024  Time Calculation  Start Time: 1412  Stop Time: 1454  Time Calculation (min): 42 min       Assessment/Plan   PT Assessment  Evaluation/Treatment Tolerance: Patient limited by fatigue  Medical Staff Made Aware: Yes  End of Session Communication: Bedside nurse  End of Session Patient Position: Bed, 3 rail up, Alarm off, not on at start of session     PT Plan  Treatment/Interventions: Bed mobility, Gait training, Transfer training, Stair training, Balance training, Neuromuscular re-education, Strengthening, Endurance training, Range of motion, Therapeutic exercise, Therapeutic activity, Home exercise program, Positioning, Postural re-education  PT Plan: Skilled PT  PT Frequency: 3 times per week  PT Discharge Recommendations: Low intensity level of continued care      General Visit Information:   PT  Visit  PT Received On: 02/20/24  General  Family/Caregiver Present: Yes  Caregiver Feedback: family present at start of session, supportive  Prior to Session Communication: Bedside nurse  Patient Position Received: Bed, 3 rail up, Alarm off, not on at start of session  General Comment: Pt is pleasant, cooperative, and willing to participate in therapy    Subjective   Precautions:  Precautions  Medical Precautions: Fall precautions (protective)  Precautions Comment: H/H: 7.6/22.9; Plts:13; WBC: 5.1  Vital Signs:  Vital Signs  Heart Rate: 100  SpO2: 96 %  BP: 121/76  BP Location: Right arm  BP Method: Automatic  Patient Position: Sitting    Objective   Pain:  Pain Assessment  Pain Assessment: 0-10  Pain Score: 3  Pain Type: Chronic pain  Pain Location:  (R shoulder)  Cognition:  Cognition  Overall Cognitive Status: Within Functional Limits  Orientation Level: Oriented X4  Postural Control:  Static Sitting Balance  Static Sitting-Balance Support: No upper extremity supported  Static Sitting-Level of  Assistance:  (SBA)  Dynamic Sitting Balance  Dynamic Sitting-Balance Support: No upper extremity supported  Dynamic Sitting-Comments: SBA  Static Standing Balance  Static Standing-Balance Support: Bilateral upper extremity supported (walker)  Static Standing-Comment/Number of Minutes: CGA  Dynamic Standing Balance  Dynamic Standing-Balance Support: Bilateral upper extremity supported (walker)  Dynamic Standing-Comments: CGA    Activity Tolerance:  Activity Tolerance  Endurance: Tolerates 30 min exercise with multiple rests  Treatments:  Therapeutic Exercise  Therapeutic Exercise Performed: Yes  Therapeutic Exercise Activity 1: LAQ 1x10  Therapeutic Exercise Activity 2: seated marching 1x20  Therapeutic Exercise Activity 3: hip add with pillow squeeze 1x15    Bed Mobility  Bed Mobility: Yes  Bed Mobility 1  Bed Mobility 1: Supine to sitting, Sitting to supine  Level of Assistance 1: Minimal verbal cues, Minimum assistance  Bed Mobility Comments 1: VCs, increased time to perform    Ambulation/Gait Training  Ambulation/Gait Training Performed: Yes  Ambulation/Gait Training 1  Surface 1: Level tile  Device 1: Rolling walker  Assistance 1: Contact guard, Minimal verbal cues  Quality of Gait 1: Soft knee(s) (decreased sam, decreased foot clearance)  Comments/Distance (ft) 1: 20 ft x3  Transfers  Transfer: Yes  Transfer 1  Transfer From 1: Sit to, Stand to  Transfer to 1: Stand, Sit  Technique 1: Sit to stand, Stand to sit  Transfer Device 1: Walker  Transfer Level of Assistance 1: Minimum assistance, Moderate verbal cues  Trials/Comments 1: VCs, increased time to perform, cues for hip and knee extension 5 trials    Stairs  Stairs: No    Outcome Measures:  [facility] Basic Mobility  Turning from your back to your side while in a flat bed without using bedrails: A little  Moving from lying on your back to sitting on the side of a flat bed without using bedrails: A little  Moving to and from bed to chair (including a  wheelchair): A little  Standing up from a chair using your arms (e.g. wheelchair or bedside chair): A little  To walk in hospital room: A little  Climbing 3-5 steps with railing: Total  Basic Mobility - Total Score: 16    Education Documentation  Precautions, taught by Maye Murphy PT at 2/20/2024  3:55 PM.  Learner: Patient  Readiness: Acceptance  Method: Explanation  Response: Verbalizes Understanding  Comment: bed mobility, transfers, gait, ther ex, activity and symptom monitoring    Mobility Training, taught by Maye Murphy, PT at 2/20/2024  3:55 PM.  Learner: Patient  Readiness: Acceptance  Method: Explanation  Response: Verbalizes Understanding  Comment: bed mobility, transfers, gait, ther ex, activity and symptom monitoring    Education Comments  No comments found.        OP EDUCATION:  Outpatient Education  Individual(s) Educated: Patient  Education Provided: Fall Risk, Home Exercise Program, POC (bed mobility, transfer,s gait)  Plan of Care Discussed and Agreed Upon: yes  Patient Response to Education: Patient/Caregiver Verbalized Understanding of Information    Encounter Problems       Encounter Problems (Active)       Balance       Pt will score a 25/28 or greater on the Tinetti balance assessment in order to demonstrate low fall risk.  (Progressing)       Start:  02/06/24    Expected End:  02/27/24               Mobility       STG - Patient will ambulate >800 ft independently with no LOB, progress as able and appropriate (Progressing)       Start:  02/06/24    Expected End:  02/27/24            Pt will ambulate 800 ft independently with no signs of fatigue or SOB  (Progressing)       Start:  02/06/24    Expected End:  02/27/24               Pain - Adult          Transfers       STG - Patient will perform bed mobility independently  (Progressing)       Start:  02/06/24    Expected End:  02/27/24            STG - Patient will transfer sit to and from stand independently  (Progressing)       Start:   02/06/24    Expected End:  02/27/24 02/20/24 at 3:57 PM - Maye Murphy, PT

## 2024-02-21 LAB
ALBUMIN SERPL BCP-MCNC: 2.8 G/DL (ref 3.4–5)
ALBUMIN SERPL BCP-MCNC: 2.8 G/DL (ref 3.4–5)
ALP SERPL-CCNC: 86 U/L (ref 33–136)
ALP SERPL-CCNC: 92 U/L (ref 33–136)
ALT SERPL W P-5'-P-CCNC: 14 U/L (ref 10–52)
ALT SERPL W P-5'-P-CCNC: 16 U/L (ref 10–52)
ANION GAP SERPL CALC-SCNC: 11 MMOL/L (ref 10–20)
AST SERPL W P-5'-P-CCNC: 12 U/L (ref 9–39)
AST SERPL W P-5'-P-CCNC: 13 U/L (ref 9–39)
BASOPHILS # BLD MANUAL: 0 X10*3/UL (ref 0–0.1)
BASOPHILS NFR BLD MANUAL: 0 %
BILIRUB DIRECT SERPL-MCNC: 0.2 MG/DL (ref 0–0.3)
BILIRUB DIRECT SERPL-MCNC: 0.2 MG/DL (ref 0–0.3)
BILIRUB SERPL-MCNC: 0.6 MG/DL (ref 0–1.2)
BILIRUB SERPL-MCNC: 0.7 MG/DL (ref 0–1.2)
BLOOD EXPIRATION DATE: NORMAL
BLOOD EXPIRATION DATE: NORMAL
BUN SERPL-MCNC: 7 MG/DL (ref 6–23)
CALCIUM SERPL-MCNC: 8.8 MG/DL (ref 8.6–10.6)
CHLORIDE SERPL-SCNC: 104 MMOL/L (ref 98–107)
CO2 SERPL-SCNC: 27 MMOL/L (ref 21–32)
CREAT SERPL-MCNC: 1.09 MG/DL (ref 0.5–1.3)
DISPENSE STATUS: NORMAL
DISPENSE STATUS: NORMAL
EGFRCR SERPLBLD CKD-EPI 2021: 70 ML/MIN/1.73M*2
EOSINOPHIL # BLD MANUAL: 0 X10*3/UL (ref 0–0.4)
EOSINOPHIL NFR BLD MANUAL: 0 %
ERYTHROCYTE [DISTWIDTH] IN BLOOD BY AUTOMATED COUNT: 17 % (ref 11.5–14.5)
GLUCOSE SERPL-MCNC: 86 MG/DL (ref 74–99)
HCT VFR BLD AUTO: 23.7 % (ref 41–52)
HGB BLD-MCNC: 7.7 G/DL (ref 13.5–17.5)
IMM GRANULOCYTES # BLD AUTO: 0.71 X10*3/UL (ref 0–0.5)
IMM GRANULOCYTES NFR BLD AUTO: 7.4 % (ref 0–0.9)
LYMPHOCYTES # BLD MANUAL: 0.33 X10*3/UL (ref 0.8–3)
LYMPHOCYTES NFR BLD MANUAL: 3.4 %
MAGNESIUM SERPL-MCNC: 1.8 MG/DL (ref 1.6–2.4)
MAGNESIUM SERPL-MCNC: 1.86 MG/DL (ref 1.6–2.4)
MCH RBC QN AUTO: 32.9 PG (ref 26–34)
MCHC RBC AUTO-ENTMCNC: 32.5 G/DL (ref 32–36)
MCV RBC AUTO: 101 FL (ref 80–100)
MONOCYTES # BLD MANUAL: 0.25 X10*3/UL (ref 0.05–0.8)
MONOCYTES NFR BLD MANUAL: 2.6 %
NEUTS SEG # BLD MANUAL: 8.95 X10*3/UL (ref 1.6–5)
NEUTS SEG NFR BLD MANUAL: 93.2 %
NRBC BLD-RTO: 0.2 /100 WBCS (ref 0–0)
PHOSPHATE SERPL-MCNC: 2.8 MG/DL (ref 2.5–4.9)
PLATELET # BLD AUTO: 20 X10*3/UL (ref 150–450)
POTASSIUM SERPL-SCNC: 3 MMOL/L (ref 3.5–5.3)
PRODUCT BLOOD TYPE: 5100
PRODUCT BLOOD TYPE: 5100
PRODUCT CODE: NORMAL
PRODUCT CODE: NORMAL
PROMYELOCYTES # BLD MANUAL: 0.08 X10*3/UL
PROMYELOCYTES NFR BLD MANUAL: 0.8 %
PROT SERPL-MCNC: 4.7 G/DL (ref 6.4–8.2)
PROT SERPL-MCNC: 4.8 G/DL (ref 6.4–8.2)
RBC # BLD AUTO: 2.34 X10*6/UL (ref 4.5–5.9)
RBC MORPH BLD: ABNORMAL
SODIUM SERPL-SCNC: 139 MMOL/L (ref 136–145)
TOTAL CELLS COUNTED BLD: 117
UNIT ABO: NORMAL
UNIT ABO: NORMAL
UNIT NUMBER: NORMAL
UNIT NUMBER: NORMAL
UNIT RH: NORMAL
UNIT RH: NORMAL
UNIT VOLUME: 288
UNIT VOLUME: 292
URATE SERPL-MCNC: 2.4 MG/DL (ref 4–7.5)
URATE SERPL-MCNC: 3.2 MG/DL (ref 4–7.5)
WBC # BLD AUTO: 9.6 X10*3/UL (ref 4.4–11.3)
XM INTEP: NORMAL
XM INTEP: NORMAL

## 2024-02-21 PROCEDURE — 84100 ASSAY OF PHOSPHORUS: CPT | Performed by: PHYSICIAN ASSISTANT

## 2024-02-21 PROCEDURE — 1170000001 HC PRIVATE ONCOLOGY ROOM DAILY

## 2024-02-21 PROCEDURE — 82248 BILIRUBIN DIRECT: CPT | Performed by: PHYSICIAN ASSISTANT

## 2024-02-21 PROCEDURE — 2500000001 HC RX 250 WO HCPCS SELF ADMINISTERED DRUGS (ALT 637 FOR MEDICARE OP): Performed by: PHYSICIAN ASSISTANT

## 2024-02-21 PROCEDURE — 2500000002 HC RX 250 W HCPCS SELF ADMINISTERED DRUGS (ALT 637 FOR MEDICARE OP, ALT 636 FOR OP/ED): Performed by: PHYSICIAN ASSISTANT

## 2024-02-21 PROCEDURE — 30243N1 TRANSFUSION OF NONAUTOLOGOUS RED BLOOD CELLS INTO CENTRAL VEIN, PERCUTANEOUS APPROACH: ICD-10-PCS | Performed by: INTERNAL MEDICINE

## 2024-02-21 PROCEDURE — 84550 ASSAY OF BLOOD/URIC ACID: CPT | Performed by: PHYSICIAN ASSISTANT

## 2024-02-21 PROCEDURE — 80053 COMPREHEN METABOLIC PANEL: CPT | Performed by: PHYSICIAN ASSISTANT

## 2024-02-21 PROCEDURE — 85007 BL SMEAR W/DIFF WBC COUNT: CPT | Performed by: PHYSICIAN ASSISTANT

## 2024-02-21 PROCEDURE — 2500000001 HC RX 250 WO HCPCS SELF ADMINISTERED DRUGS (ALT 637 FOR MEDICARE OP)

## 2024-02-21 PROCEDURE — RXMED WILLOW AMBULATORY MEDICATION CHARGE

## 2024-02-21 PROCEDURE — 2500000004 HC RX 250 GENERAL PHARMACY W/ HCPCS (ALT 636 FOR OP/ED): Performed by: INTERNAL MEDICINE

## 2024-02-21 PROCEDURE — 2500000002 HC RX 250 W HCPCS SELF ADMINISTERED DRUGS (ALT 637 FOR MEDICARE OP, ALT 636 FOR OP/ED)

## 2024-02-21 PROCEDURE — 2500000001 HC RX 250 WO HCPCS SELF ADMINISTERED DRUGS (ALT 637 FOR MEDICARE OP): Performed by: INTERNAL MEDICINE

## 2024-02-21 PROCEDURE — 83735 ASSAY OF MAGNESIUM: CPT | Performed by: PHYSICIAN ASSISTANT

## 2024-02-21 PROCEDURE — 2500000004 HC RX 250 GENERAL PHARMACY W/ HCPCS (ALT 636 FOR OP/ED): Performed by: STUDENT IN AN ORGANIZED HEALTH CARE EDUCATION/TRAINING PROGRAM

## 2024-02-21 PROCEDURE — 85027 COMPLETE CBC AUTOMATED: CPT | Performed by: PHYSICIAN ASSISTANT

## 2024-02-21 PROCEDURE — 2500000004 HC RX 250 GENERAL PHARMACY W/ HCPCS (ALT 636 FOR OP/ED)

## 2024-02-21 RX ORDER — OXYCODONE HYDROCHLORIDE 5 MG/1
5 TABLET ORAL EVERY 6 HOURS PRN
Qty: 15 TABLET | Refills: 0 | Status: SHIPPED | OUTPATIENT
Start: 2024-02-21 | End: 2024-02-24

## 2024-02-21 RX ORDER — LEVOFLOXACIN 500 MG/1
750 TABLET, FILM COATED ORAL
Status: DISCONTINUED | OUTPATIENT
Start: 2024-02-22 | End: 2024-02-22 | Stop reason: HOSPADM

## 2024-02-21 RX ORDER — SUCRALFATE 1 G/10ML
1 SUSPENSION ORAL EVERY 6 HOURS PRN
Qty: 414 ML | Refills: 0 | Status: SHIPPED | OUTPATIENT
Start: 2024-02-21 | End: 2024-02-27 | Stop reason: ALTCHOICE

## 2024-02-21 RX ORDER — DULOXETIN HYDROCHLORIDE 30 MG/1
30 CAPSULE, DELAYED RELEASE ORAL NIGHTLY
Qty: 30 CAPSULE | Refills: 11 | Status: SHIPPED | OUTPATIENT
Start: 2024-02-21 | End: 2024-03-25 | Stop reason: SDUPTHER

## 2024-02-21 RX ORDER — ONDANSETRON HYDROCHLORIDE 8 MG/1
8 TABLET, FILM COATED ORAL EVERY 8 HOURS PRN
Qty: 20 TABLET | Refills: 0 | Status: SHIPPED | OUTPATIENT
Start: 2024-02-21 | End: 2024-05-03

## 2024-02-21 RX ORDER — FLUCONAZOLE 200 MG/1
400 TABLET ORAL DAILY
Qty: 28 TABLET | Refills: 0 | Status: SHIPPED | OUTPATIENT
Start: 2024-02-22 | End: 2024-02-27 | Stop reason: ALTCHOICE

## 2024-02-21 RX ORDER — PANTOPRAZOLE SODIUM 40 MG/1
40 TABLET, DELAYED RELEASE ORAL
Qty: 30 TABLET | Refills: 11 | Status: SHIPPED | OUTPATIENT
Start: 2024-02-22 | End: 2025-02-21

## 2024-02-21 RX ORDER — PROCHLORPERAZINE MALEATE 10 MG
10 TABLET ORAL EVERY 6 HOURS PRN
Qty: 30 TABLET | Refills: 0 | Status: SHIPPED | OUTPATIENT
Start: 2024-02-21 | End: 2024-03-22

## 2024-02-21 RX ORDER — LEVOFLOXACIN 750 MG/1
750 TABLET ORAL
Qty: 7 TABLET | Refills: 0 | Status: SHIPPED | OUTPATIENT
Start: 2024-02-22 | End: 2024-03-01 | Stop reason: ALTCHOICE

## 2024-02-21 RX ORDER — POTASSIUM CHLORIDE 29.8 MG/ML
40 INJECTION INTRAVENOUS ONCE
Status: COMPLETED | OUTPATIENT
Start: 2024-02-21 | End: 2024-02-21

## 2024-02-21 RX ORDER — METRONIDAZOLE 500 MG/1
500 TABLET ORAL EVERY 8 HOURS SCHEDULED
Status: DISCONTINUED | OUTPATIENT
Start: 2024-02-22 | End: 2024-02-22 | Stop reason: HOSPADM

## 2024-02-21 RX ORDER — METRONIDAZOLE 500 MG/1
500 TABLET ORAL EVERY 8 HOURS SCHEDULED
Status: DISCONTINUED | OUTPATIENT
Start: 2024-02-21 | End: 2024-02-21

## 2024-02-21 RX ORDER — POTASSIUM CHLORIDE 20 MEQ/1
40 TABLET, EXTENDED RELEASE ORAL ONCE
Status: COMPLETED | OUTPATIENT
Start: 2024-02-21 | End: 2024-02-21

## 2024-02-21 RX ORDER — LOPERAMIDE HYDROCHLORIDE 2 MG/1
2 CAPSULE ORAL 4 TIMES DAILY PRN
Qty: 30 CAPSULE | Refills: 0 | Status: SHIPPED | OUTPATIENT
Start: 2024-02-21 | End: 2024-03-22

## 2024-02-21 RX ORDER — METRONIDAZOLE 500 MG/1
500 TABLET ORAL EVERY 8 HOURS SCHEDULED
Qty: 30 TABLET | Refills: 0 | Status: SHIPPED | OUTPATIENT
Start: 2024-02-22 | End: 2024-03-01 | Stop reason: ALTCHOICE

## 2024-02-21 RX ADMIN — PROCHLORPERAZINE EDISYLATE 10 MG: 5 INJECTION INTRAMUSCULAR; INTRAVENOUS at 09:24

## 2024-02-21 RX ADMIN — PIPERACILLIN SODIUM AND TAZOBACTAM SODIUM 4.5 G: 4; .5 INJECTION, SOLUTION INTRAVENOUS at 04:47

## 2024-02-21 RX ADMIN — GABAPENTIN 600 MG: 300 CAPSULE ORAL at 20:58

## 2024-02-21 RX ADMIN — FLUCONAZOLE 400 MG: 200 TABLET ORAL at 08:47

## 2024-02-21 RX ADMIN — DULOXETINE HYDROCHLORIDE 30 MG: 30 CAPSULE, DELAYED RELEASE ORAL at 20:58

## 2024-02-21 RX ADMIN — PIPERACILLIN SODIUM AND TAZOBACTAM SODIUM 4.5 G: 4; .5 INJECTION, SOLUTION INTRAVENOUS at 22:32

## 2024-02-21 RX ADMIN — PIPERACILLIN SODIUM AND TAZOBACTAM SODIUM 4.5 G: 4; .5 INJECTION, SOLUTION INTRAVENOUS at 16:47

## 2024-02-21 RX ADMIN — SODIUM CHLORIDE 50 ML/HR: 9 INJECTION, SOLUTION INTRAVENOUS at 09:31

## 2024-02-21 RX ADMIN — Medication 10 MG: at 20:58

## 2024-02-21 RX ADMIN — GABAPENTIN 300 MG: 300 CAPSULE ORAL at 14:05

## 2024-02-21 RX ADMIN — POTASSIUM CHLORIDE 40 MEQ: 1500 TABLET, EXTENDED RELEASE ORAL at 08:47

## 2024-02-21 RX ADMIN — POTASSIUM CHLORIDE 40 MEQ: 29.8 INJECTION, SOLUTION INTRAVENOUS at 04:47

## 2024-02-21 RX ADMIN — PANTOPRAZOLE SODIUM 40 MG: 40 TABLET, DELAYED RELEASE ORAL at 06:35

## 2024-02-21 RX ADMIN — ACYCLOVIR 400 MG: 400 TABLET ORAL at 20:58

## 2024-02-21 RX ADMIN — ACYCLOVIR 400 MG: 400 TABLET ORAL at 08:47

## 2024-02-21 RX ADMIN — PIPERACILLIN SODIUM AND TAZOBACTAM SODIUM 4.5 G: 4; .5 INJECTION, SOLUTION INTRAVENOUS at 11:15

## 2024-02-21 RX ADMIN — GABAPENTIN 300 MG: 300 CAPSULE ORAL at 08:46

## 2024-02-21 ASSESSMENT — PAIN DESCRIPTION - DESCRIPTORS: DESCRIPTORS: ACHING

## 2024-02-21 ASSESSMENT — COGNITIVE AND FUNCTIONAL STATUS - GENERAL
MOBILITY SCORE: 16
TURNING FROM BACK TO SIDE WHILE IN FLAT BAD: A LITTLE
TURNING FROM BACK TO SIDE WHILE IN FLAT BAD: A LITTLE
CLIMB 3 TO 5 STEPS WITH RAILING: TOTAL
DRESSING REGULAR LOWER BODY CLOTHING: A LITTLE
HELP NEEDED FOR BATHING: A LITTLE
CLIMB 3 TO 5 STEPS WITH RAILING: TOTAL
STANDING UP FROM CHAIR USING ARMS: A LITTLE
HELP NEEDED FOR BATHING: A LITTLE
WALKING IN HOSPITAL ROOM: A LITTLE
MOVING FROM LYING ON BACK TO SITTING ON SIDE OF FLAT BED WITH BEDRAILS: A LITTLE
DAILY ACTIVITIY SCORE: 19
TOILETING: A LITTLE
DRESSING REGULAR UPPER BODY CLOTHING: A LITTLE
DRESSING REGULAR UPPER BODY CLOTHING: A LITTLE
STANDING UP FROM CHAIR USING ARMS: A LITTLE
DRESSING REGULAR LOWER BODY CLOTHING: A LITTLE
MOVING TO AND FROM BED TO CHAIR: A LITTLE
PERSONAL GROOMING: A LITTLE
TOILETING: A LITTLE
MOVING TO AND FROM BED TO CHAIR: A LITTLE
DAILY ACTIVITIY SCORE: 19
PERSONAL GROOMING: A LITTLE
MOVING FROM LYING ON BACK TO SITTING ON SIDE OF FLAT BED WITH BEDRAILS: A LITTLE
WALKING IN HOSPITAL ROOM: A LITTLE
MOBILITY SCORE: 16

## 2024-02-21 ASSESSMENT — PAIN - FUNCTIONAL ASSESSMENT
PAIN_FUNCTIONAL_ASSESSMENT: 0-10
PAIN_FUNCTIONAL_ASSESSMENT: 0-10

## 2024-02-21 ASSESSMENT — PAIN SCALES - GENERAL
PAINLEVEL_OUTOF10: 1
PAINLEVEL_OUTOF10: 0 - NO PAIN
PAINLEVEL_OUTOF10: 2

## 2024-02-21 NOTE — PROGRESS NOTES
David Ferreira is a 78 y.o. male on day 16 of admission presenting with IgG multiple myeloma (CMS/HCC).    Subjective     This morning, he reports feeling very well, walking with a walker this morning, resolved abdominal pain. He does report some nausea, After extensive discussion w/ his family, it sounds as though he typically has nausea w/ pain medication. He otherwise denies fever, chills, headache, vision changes, CP, SOB, cough, abdominal pain, n/v/c/d, bleeding, bruising, rashes.      Objective     Physical Exam  Constitutional:       General: He is not in acute distress.  HENT:      Head: Normocephalic and atraumatic.      Mouth/Throat:      Mouth: Mucous membranes are moist.      Pharynx: No oropharyngeal exudate or posterior oropharyngeal erythema.        Comments: Area, about 1/2 dime-sized of purplish discoloration on the tip of the tongue; per patient this is chronic, intermittent. There are other purplish, non-ulcerated lesions of the same size, one on the L interior angle and another on the R buccal mucosa, without evidence of bleeding.    Eyes:      Extraocular Movements: Extraocular movements intact.      Pupils: Pupils are equal, round, and reactive to light.   Cardiovascular:      Rate and Rhythm: Normal rate and regular rhythm.      Heart sounds: No murmur heard.     No friction rub. No gallop.   Pulmonary:      Effort: No respiratory distress.      Breath sounds: Normal breath sounds. No wheezing or rhonchi.   Abdominal:      General: Bowel sounds are normal.      Palpations: Abdomen is soft.      Tenderness: There is no abdominal tenderness. There is no rebound.      Hernia: No hernia is present.          Comments: LLQ nontender with palpation but rebound tenderness present.    Musculoskeletal:         General: Normal range of motion.      Right hand: Swelling (trace non-pittting edema of the R hand) present.      Right lower leg: No edema.      Left lower leg: No edema.      Comments: 2+ BLE  edema and 1+ BUE edema   Skin:     General: Skin is warm and dry.   Neurological:      General: No focal deficit present.      Mental Status: He is alert and oriented to person, place, and time.      Cranial Nerves: No cranial nerve deficit.      Sensory: No sensory deficit.      Motor: No weakness.      Coordination: Coordination normal.   Psychiatric:         Mood and Affect: Mood normal.         Behavior: Behavior normal.       Assessment/Plan   Principal Problem:    IgG multiple myeloma (CMS/HCC)  Active Problems:    Peripheral neuropathy    Stem cells transplant status (CMS/HCC)    Chemotherapy induced nausea and vomiting    David Ferreira is a 78 y.o. male with IgG Story Myeloma in [place] admitted for his Autologous SCT with Sonja 140 (T=0, 2/07/24)     T+14 from Autologous SCT prepped with Melphalan 140mg/m2    Active issues today (02/21/24):  # Diverticulitis, LLQ tenderness in the setting of neutropenia without signs of sepsis. CT pending. BC pending. Started Zosyn on 2/16, dose increased to 4.5 today for coverage of diverticulitis seen on CT.   # Chemotherapy induced nausea, worsening. Switched antiemetic regimen.   #Fatigue and weakness since 2/17, liekly 2/2 to anemia and AutoSCT.      ONC  # IgG Kappa Multiple Myeloma  - Currently in [place]  - Autologous SCT (2/07/24) prepped with Melphalan 140mg/m2    CHEMO:   -Sonja 140 mg/m2, 267.4 mg on T-1     HEME  # Pancytopenia  :: Due to disease and chemotherapy   - Transfuse for Hgb < 7 g/dL, platelets < 10 k/uL  - Neupogen (2/12-2/21)  - Platelets for AM count of 8 k/uL (2/16/24)      ID  # Diverticulitis  # LLQ tenderness in the setting of neutropenia without signs of sepsis (2/16/24)  - No fevers, tachycardia, or hypotension  - Bcx x 2 (2/16/24): negative  - Cdiff (2/19) negative  - CT abdomen pelvis w contrast (2/16/24): Diverticulitis of the sigmoid colon.   - Continue piperacillin-tazobactam 3.375 g Q6H (2/16- ), dose increased to 4.5 on 2/17. Will switch to oral  flagyl and levofloxacin x 7 days starting 2/22.   # Prophylaxis  - ACV, Fluconazole     FEN/GI  - Admit wt: 73.9 kg  Current wt: 74.2  kg (02/21/24)  - IVF: NS @ 75 mL/hr (2/16/24)  # Chemotherapy induced nausea and vomiting  - s/p haloperidol 0.5 mg + lorazepam 0.5 mg PO Q6H for delayed CINV, per Dr. Freire. Stopped 2/19 with fatigue/weakness.   - Qtc 404 ms (2/16/24)  - Ondansetron 8 mg PO TID moved to PRN (2/16- )  - Continue prochlorperazine PRN  - Stopped IV nausea medications 2/22 to ensure at home going will tolerate PO  # PUD prophy: Protonix  # Protein malnutrition  - Supplements ordered TID with meals  #FVO, improving  - 3kg weight gain between 2/17 and 2/18, edematous on exam with dry cough.  - Lasix 40mg IV (2/18, 2/20) w/ significant response  - Gentle IVF (stopped 2/22)  - CXR portable (2/18) showing mild pulm edema.   -Pt requestting wu removal, 2/22  #diarrhea  -Cdiff negative  -PRN imodium      CV  # Functional surveillance  - ECHO (11/7/23) with EF of 65%     MSK  # R Rotator cuff tear  - Continue Capsaicin and oxycodone as needed  #Abnormal CT finding  - CT A/P 2/17: diffuse thickening and edema surrounding bilateral psoas  muscles, gluteal muscles, and quadratus femoris muscles. There is  diffuse periarticular soft tissue thickening likely related to  synovial/capsular thickening (right > left). There is also diffuse  soft tissue thickening along the posterior aspects of the greater  trochanters (right > left); for example, there is of 4.4 cm x 4 cm  area of heterogeneous soft tissue posterior to the right greater  trochanter with scattered foci of calcification that could be related  to calcium hydroxy appetite deposition disease.   - Radiology recommending further assessment with MRI pelvis w/ and w/o contrast.      NEURO:  # Peripheral neuropathy B/l LE and 2/2 B/L carpal tunnel syndrome s/p repair  - Continue gabapentin and Cymbalta 30mg     ACCESS/SUPPORT/DISPO  - Full code  -  Trialysis  - Primary oncologist: Dr. Contreras     Patient seen examined and discussed with Dr. Tani Payan PA-C

## 2024-02-21 NOTE — CARE PLAN
Problem: Fall/Injury  Goal: Not fall by end of shift  Outcome: Progressing  Goal: Be free from injury by end of the shift  Outcome: Progressing  Goal: Verbalize understanding of personal risk factors for fall in the hospital  Outcome: Progressing  Goal: Verbalize understanding of risk factor reduction measures to prevent injury from fall in the home  Outcome: Progressing  Goal: Use assistive devices by end of the shift  Outcome: Progressing  Goal: Pace activities to prevent fatigue by end of the shift  Outcome: Progressing     Problem: Pain - Adult  Goal: Verbalizes/displays adequate comfort level or baseline comfort level  Outcome: Progressing     Problem: Safety - Adult  Goal: Free from fall injury  Outcome: Progressing     Problem: Discharge Planning  Goal: Discharge to home or other facility with appropriate resources  Outcome: Progressing     Problem: Chronic Conditions and Co-morbidities  Goal: Patient's chronic conditions and co-morbidity symptoms are monitored and maintained or improved  Outcome: Progressing    The clinical goals for the shift include VSS, remain free from fall/injury; adequate rest; pain management    VSS on RA, reporting R shoulder pain; administered PRN oxy and PRN capsaicin lotion with relief. Tolerating mIVFs and IV abx. Mendoza catheter in place. R CVC dressing changed. Pt resting well throughout night. Remains free from fall/injury.

## 2024-02-21 NOTE — PROGRESS NOTES
"Music Therapy Note    Therapy Session  Referral Type: New referral this admission  Visit Type: New visit  Session Start Time: 1315  Session End Time: 1333  Intervention Delivery: In-person  Number of family members present: 2  Family Present for Session: Spouse/Significant Other, Child  Family Participation: Other (Comment) (supportive, but disruptive at times)     Pre-assessment  Mood/Affect: Appropriate, Calm, Participative, Cooperative  Verbalized Emotional State: Enjoyment, Happiness, Relaxed         Treatment/Interventions  Areas of Focus: Coping, Pain management, Normalization, Mood modification  Music Therapy Interventions: Live music listening, Music sharing/discussion  Co-Treatment: Music therapy (MT Intern)  Interruption: No  Patient Fell Asleep at End of Session: No    Post-assessment  Mood/Affect: Distracted, Appropriate, Cooperative, Participative, Calm  Verbalized Emotional State: Gratitude, Enjoyment  Continue Visiting: Yes  Total Session Time (min): 18 minutes    Narrative  Assessment Detail: Pt was found in bed accompanied with wife and daughter at bedside. Pt invited MTs in and was agreeable to music therapy at this time.  Plan: Engage pt through live music listening of preferred music to assist with pain management, coping, and mood modification.  Evaluation: Pt first introduced his wife and daughter to MTs. Pt began talking about the various guitars he had. Pt then requested Gaurav Reyes, so MTs played a few songs for him. During this time, pt's daughter stepped into the sim to take a phone call. Pt then asked MTs to play a Yazidism song and requested \"Amazing Debra.\" During this time, pt's wife was repeatedly asking where their daughter went and attempting to go look for her, and pt was answering her questions and asking her to remain in the room. Pt attempted to ask MTs specifics about the key and chords used in the song, but his wife was trying to talk to him. MTs discussed returning to see " pt when family is not present. Pt expressed gratitude for the music.  Follow-up: Will continue to follow.    Education Documentation  No documentation found.

## 2024-02-21 NOTE — PROGRESS NOTES
"Nutrition Follow Up Assessment  Nutrition Assessment      Today is T+14 s/p Auto Transplant (T=0 on 02/07/24). Noted pt starting on Zosyn on 02/16 d/t new diagnosis of diverticulitis.    Nutrition History:  This service met with pt and pt's family at bedside earlier today, pt sitting up in bed at time of visit.    Tells since last nutrition visit, went through some additional days with issues with n/v, diarrhea, and abdominal pains resulting in decreased PO.    Over the last couple of days, still with c/o intermittent nausea (no recent vomiting) and diarrhea, though abdominal pains have been improving. Also reports taste is improving. No trouble chewing/swallowing.    Appetite is starting to come back, primarily eating foods his family brings in for him. Tries to eat at least 1 meal/day x the last couple of days. Yesterday recall eating a bologna sandwich and some potato soup, able to eat all of the sandwich and ~50% of the soup. This am, family and pt ordered from [facility] for breakfast meal--awaiting arrival during visit.    Trying to drink fluids throughout the day, noted with juices, water, and Kota-D in his room during conversation with him. No longer drinking the Ensure Plus, does not like it.    Anthropometrics:  Height: 165.1 cm (5' 5\")   Weight: 74.2 kg (163 lb 9.3 oz)   BMI (Calculated): 27.22  IBW/kg (Dietitian Calculated): 61.8 kg  Percent of IBW: 120 %       Admission Weight Trend:  02/05-73.9kg (admit wt)  02/14-73.8kg (wt at last nutrition visit)  02/21-74.2kg (current wt)--wt appears stable, though d/t extended hospitalization, suspect pt likely retaining fluids, true BW likely lower than what is currently recorded    Nutrition Focused Physical Exam Findings:  defer: completed at a prior nutrition visit  Edema:  Edema: none  Physical Findings:  Skin: Negative    Nutrition Significant Labs:  CBC Trend:   Results from last 7 days   Lab Units 02/21/24  0150 02/20/24  0248 02/19/24  0500 02/18/24  1829 "   WBC AUTO x10*3/uL 9.6 5.1 2.4* 1.6*   RBC AUTO x10*6/uL 2.34* 2.30* 1.96* 1.93*   HEMOGLOBIN g/dL 7.7* 7.6* 6.9* 6.7*   HEMATOCRIT % 23.7* 22.9* 20.6* 20.2*   MCV fL 101* 100 105* 105*   PLATELETS AUTO x10*3/uL 20* 13* 10* 11*   BMP Trend:   Results from last 7 days   Lab Units 02/21/24  0150 02/20/24  0252 02/19/24  0500 02/18/24  0451   GLUCOSE mg/dL 86 91 92 101*   CALCIUM mg/dL 8.8 8.8 9.0 9.1   SODIUM mmol/L 139 137 137 138   POTASSIUM mmol/L 3.0* 3.1* 3.2* 3.5   CO2 mmol/L 27 25 27 23   CHLORIDE mmol/L 104 105 105 106   BUN mg/dL 7 8 11 10   CREATININE mg/dL 1.09 0.99 1.09 1.01   Liver Function Trend:   Results from last 7 days   Lab Units 02/21/24  0150 02/19/24  0500 02/18/24  0451 02/17/24  0225   ALK PHOS U/L 92 66 69 64   AST U/L 12 10 9 10   ALT U/L 16 10 9* 5*   BILIRUBIN TOTAL mg/dL 0.6 0.5 0.6 0.5   Renal Lab Trend:   Results from last 7 days   Lab Units 02/21/24  0150 02/20/24  0252 02/19/24  0500 02/18/24  0451   POTASSIUM mmol/L 3.0* 3.1* 3.2* 3.5   PHOSPHORUS mg/dL 2.8 2.4* 2.7 3.1   SODIUM mmol/L 139 137 137 138   MAGNESIUM mg/dL 1.80  --  1.84 2.05   EGFR mL/min/1.73m*2 70 78 70 77   BUN mg/dL 7 8 11 10   CREATININE mg/dL 1.09 0.99 1.09 1.01      Medications:  Scheduled medications  acyclovir, 400 mg, oral, q12h  DULoxetine, 30 mg, oral, Nightly  fluconazole, 400 mg, oral, Daily  gabapentin, 300 mg, oral, BID  gabapentin, 600 mg, oral, Nightly  [START ON 2/22/2024] levoFLOXacin, 750 mg, oral, q24h LATOYA  melatonin, 10 mg, oral, Nightly  [START ON 2/22/2024] metroNIDAZOLE, 500 mg, oral, q8h LATOYA  pantoprazole, 40 mg, oral, Daily before breakfast  piperacillin-tazobactam, 4.5 g, intravenous, q6h    PRN medications  PRN medications: alteplase, capsaicin, loperamide, ondansetron, oxyCODONE, prochlorperazine, sucralfate    I/O:   Last BM Date: 02/20/24    Dietary Orders (From admission, onward)       Start     Ordered    02/21/24 1118  Oral nutritional supplements  Until discontinued        Question  Answer Comment   Deliver with Breakfast    Select supplement: Ensure Clear        02/21/24 1117    02/08/24 1106  Adult diet Low pathogen  Diet effective now        Comments: pt may order from [facility] [employer], if requested   Question:  Diet type  Answer:  Low pathogen    02/08/24 1105    02/05/24 1654  May Participate in Room Service  Once        Question:  .  Answer:  Yes    02/05/24 1654                Estimated Needs:   Total Energy Estimated Needs (kCal): 8201-7084  Method for Estimating Needs: MSJ (NNG=0937) x ~1.3-1.4  Total Protein Estimated Needs (g): 80+  Method for Estimating Needs: 62 (IBW) x ~1.3g/kg+  Total Fluid Estimated Needs (mL): per MD/team        Nutrition Diagnosis   Malnutrition Diagnosis  Patient has Malnutrition Diagnosis: No    Nutrition Diagnosis  Patient has Nutrition Diagnosis: Yes  Diagnosis Status (1): Ongoing  Nutrition Diagnosis 1: Increased nutrient needs  Related to (1): increased metabolic demand  As Evidenced by (1): pt with Myeloma s/p recent transplant  Additional Assessment Information (1): Do not feel malnutrition present at this time--areas of mild adipose losses, likely age-related.    Additional Nutrition Diagnosis: Diagnosis 2  Diagnosis Status (2): Ongoing  Nutrition Diagnosis 2: Inadequate oral intake  Related to (2): decreased PO with treatment related side effects  As Evidenced by (2): pt report of declining PO from baseline x >1 week, consuming smaller amounts of food at a time, need for oral nutrition supplements    Additional Assessment Information: Overall nutritional status difficult to determine at this time--PO is decreased, though any potential wt losses since admit likely being masked by fluids. Areas of mild/moderate muscle and adiposses losses likely also, at least in part, a factor of age. Do feel pt continues to benefit from use of oral nutrition supplements in-house, considering decreased PO + hypermetabolic state, discussed with  him, wants order for Ensure Plus daily to be cancelled, agreeable to trial Ensure Clear. One carton sent to his room after my visit, per his request.       Nutrition Interventions/Recommendations     1. Continue Low Pathogen Diet, only as tolerated. Considering decreased PO with hypermetabolic state, do not feel further dietary restrictions are indicated at this time.  --RDN adjusted orders for oral nutrition supplements, per pt's request.        Nutrition Prescription for Oral Nutrition: 2779-5321 kcals/day, 80+ g pro/day        Nutrition Interventions:   Food and/or Nutrient Delivery Interventions  Interventions: Meals and snacks, Medical food supplement  Meals and Snacks: General healthful diet  Goal: Low Pathogen Diet  Medical Food Supplement: Commercial beverage  Goal: Ensure Clear daily (240kcals, 8g  pro each)    Nutrition Education: Encouraged ongoing PO. Pt/family denied any particular questions for RDN at this time.       Nutrition Monitoring and Evaluation   Food/Nutrient Related History Monitoring  Monitoring and Evaluation Plan: Energy intake  Energy Intake: Estimated energy intake  Criteria: consume >50% of 2-3 meals/day + 100% of 1 carton of Ensure Clear/day    Body Composition/Growth/Weight History  Monitoring and Evaluation Plan: Weight  Weight: Measured weight  Criteria: daily wts; reduction in wt from fluids, then wt stabilization    Biochemical Data, Medical Tests and Procedures  Monitoring and Evaluation Plan: Electrolyte/renal panel  Electrolyte and Renal Panel: Magnesium, Phosphorus, Potassium, Sodium  Criteria: lytes WNL    Time Spent/Follow-up Reminder:   Time Spent (min): 30 minutes  Last Date of Nutrition Visit: 02/21/24  Nutrition Follow-Up Needed?: Dietitian to reassess per policy  Follow up Comment: PO diet

## 2024-02-22 ENCOUNTER — APPOINTMENT (OUTPATIENT)
Dept: VASCULAR MEDICINE | Facility: HOSPITAL | Age: 79
DRG: 016 | End: 2024-02-22
Payer: MEDICARE

## 2024-02-22 ENCOUNTER — HOME HEALTH ADMISSION (OUTPATIENT)
Dept: HOME HEALTH SERVICES | Facility: HOME HEALTH | Age: 79
End: 2024-02-22
Payer: MEDICARE

## 2024-02-22 ENCOUNTER — PHARMACY VISIT (OUTPATIENT)
Dept: PHARMACY | Facility: CLINIC | Age: 79
End: 2024-02-22
Payer: COMMERCIAL

## 2024-02-22 VITALS
BODY MASS INDEX: 28.1 KG/M2 | HEART RATE: 83 BPM | WEIGHT: 168.65 LBS | TEMPERATURE: 98.1 F | RESPIRATION RATE: 14 BRPM | SYSTOLIC BLOOD PRESSURE: 121 MMHG | OXYGEN SATURATION: 98 % | HEIGHT: 65 IN | DIASTOLIC BLOOD PRESSURE: 71 MMHG

## 2024-02-22 LAB
ALBUMIN SERPL BCP-MCNC: 2.8 G/DL (ref 3.4–5)
ALP SERPL-CCNC: 103 U/L (ref 33–136)
ALT SERPL W P-5'-P-CCNC: 16 U/L (ref 10–52)
ANION GAP SERPL CALC-SCNC: 13 MMOL/L (ref 10–20)
AST SERPL W P-5'-P-CCNC: 13 U/L (ref 9–39)
ATRIAL RATE: 84 BPM
BASOPHILS # BLD MANUAL: 0 X10*3/UL (ref 0–0.1)
BASOPHILS NFR BLD MANUAL: 0 %
BILIRUB DIRECT SERPL-MCNC: 0.2 MG/DL (ref 0–0.3)
BILIRUB SERPL-MCNC: 0.6 MG/DL (ref 0–1.2)
BUN SERPL-MCNC: 8 MG/DL (ref 6–23)
CALCIUM SERPL-MCNC: 9.1 MG/DL (ref 8.6–10.6)
CHLORIDE SERPL-SCNC: 106 MMOL/L (ref 98–107)
CO2 SERPL-SCNC: 24 MMOL/L (ref 21–32)
CREAT SERPL-MCNC: 1.1 MG/DL (ref 0.5–1.3)
EGFRCR SERPLBLD CKD-EPI 2021: 69 ML/MIN/1.73M*2
EOSINOPHIL # BLD MANUAL: 0 X10*3/UL (ref 0–0.4)
EOSINOPHIL NFR BLD MANUAL: 0 %
ERYTHROCYTE [DISTWIDTH] IN BLOOD BY AUTOMATED COUNT: 17 % (ref 11.5–14.5)
GLUCOSE SERPL-MCNC: 76 MG/DL (ref 74–99)
HCT VFR BLD AUTO: 24.1 % (ref 41–52)
HGB BLD-MCNC: 7.8 G/DL (ref 13.5–17.5)
IMM GRANULOCYTES # BLD AUTO: 1.47 X10*3/UL (ref 0–0.5)
IMM GRANULOCYTES NFR BLD AUTO: 11.9 % (ref 0–0.9)
LYMPHOCYTES # BLD MANUAL: 0 X10*3/UL (ref 0.8–3)
LYMPHOCYTES NFR BLD MANUAL: 0 %
MAGNESIUM SERPL-MCNC: 1.81 MG/DL (ref 1.6–2.4)
MCH RBC QN AUTO: 32.8 PG (ref 26–34)
MCHC RBC AUTO-ENTMCNC: 32.4 G/DL (ref 32–36)
MCV RBC AUTO: 101 FL (ref 80–100)
METAMYELOCYTES # BLD MANUAL: 0.11 X10*3/UL
METAMYELOCYTES NFR BLD MANUAL: 0.9 %
MONOCYTES # BLD MANUAL: 0.32 X10*3/UL (ref 0.05–0.8)
MONOCYTES NFR BLD MANUAL: 2.6 %
MYELOCYTES # BLD MANUAL: 0.21 X10*3/UL
MYELOCYTES NFR BLD MANUAL: 1.7 %
NEUTROPHILS # BLD MANUAL: 11.64 X10*3/UL (ref 1.6–5.5)
NEUTS BAND # BLD MANUAL: 1.72 X10*3/UL (ref 0–0.5)
NEUTS BAND NFR BLD MANUAL: 13.9 %
NEUTS SEG # BLD MANUAL: 9.92 X10*3/UL (ref 1.6–5)
NEUTS SEG NFR BLD MANUAL: 80 %
NRBC BLD-RTO: 0 /100 WBCS (ref 0–0)
P AXIS: 40 DEGREES
P OFFSET: 163 MS
P ONSET: 110 MS
PHOSPHATE SERPL-MCNC: 2.1 MG/DL (ref 2.5–4.9)
PLATELET # BLD AUTO: 31 X10*3/UL (ref 150–450)
POTASSIUM SERPL-SCNC: 3.3 MMOL/L (ref 3.5–5.3)
PR INTERVAL: 208 MS
PROMYELOCYTES # BLD MANUAL: 0.11 X10*3/UL
PROMYELOCYTES NFR BLD MANUAL: 0.9 %
PROT SERPL-MCNC: 4.9 G/DL (ref 6.4–8.2)
Q ONSET: 214 MS
QRS COUNT: 14 BEATS
QRS DURATION: 90 MS
QT INTERVAL: 342 MS
QTC CALCULATION(BAZETT): 404 MS
QTC FREDERICIA: 382 MS
R AXIS: -15 DEGREES
RBC # BLD AUTO: 2.38 X10*6/UL (ref 4.5–5.9)
RBC MORPH BLD: ABNORMAL
SODIUM SERPL-SCNC: 140 MMOL/L (ref 136–145)
T AXIS: -3 DEGREES
T OFFSET: 385 MS
TOTAL CELLS COUNTED BLD: 115
URATE SERPL-MCNC: 3.6 MG/DL (ref 4–7.5)
VENTRICULAR RATE: 84 BPM
WBC # BLD AUTO: 12.4 X10*3/UL (ref 4.4–11.3)

## 2024-02-22 PROCEDURE — 2500000001 HC RX 250 WO HCPCS SELF ADMINISTERED DRUGS (ALT 637 FOR MEDICARE OP)

## 2024-02-22 PROCEDURE — 2500000004 HC RX 250 GENERAL PHARMACY W/ HCPCS (ALT 636 FOR OP/ED)

## 2024-02-22 PROCEDURE — 2500000001 HC RX 250 WO HCPCS SELF ADMINISTERED DRUGS (ALT 637 FOR MEDICARE OP): Performed by: PHYSICIAN ASSISTANT

## 2024-02-22 PROCEDURE — 2500000001 HC RX 250 WO HCPCS SELF ADMINISTERED DRUGS (ALT 637 FOR MEDICARE OP): Performed by: STUDENT IN AN ORGANIZED HEALTH CARE EDUCATION/TRAINING PROGRAM

## 2024-02-22 PROCEDURE — 80053 COMPREHEN METABOLIC PANEL: CPT | Performed by: PHYSICIAN ASSISTANT

## 2024-02-22 PROCEDURE — 02PYX3Z REMOVAL OF INFUSION DEVICE FROM GREAT VESSEL, EXTERNAL APPROACH: ICD-10-PCS | Performed by: INTERNAL MEDICINE

## 2024-02-22 PROCEDURE — 2500000002 HC RX 250 W HCPCS SELF ADMINISTERED DRUGS (ALT 637 FOR MEDICARE OP, ALT 636 FOR OP/ED): Mod: MUE

## 2024-02-22 PROCEDURE — 37799 UNLISTED PX VASCULAR SURGERY: CPT

## 2024-02-22 PROCEDURE — 2500000001 HC RX 250 WO HCPCS SELF ADMINISTERED DRUGS (ALT 637 FOR MEDICARE OP): Performed by: INTERNAL MEDICINE

## 2024-02-22 PROCEDURE — 83735 ASSAY OF MAGNESIUM: CPT | Performed by: PHYSICIAN ASSISTANT

## 2024-02-22 PROCEDURE — 84100 ASSAY OF PHOSPHORUS: CPT | Performed by: PHYSICIAN ASSISTANT

## 2024-02-22 PROCEDURE — 82248 BILIRUBIN DIRECT: CPT | Performed by: PHYSICIAN ASSISTANT

## 2024-02-22 PROCEDURE — 2500000002 HC RX 250 W HCPCS SELF ADMINISTERED DRUGS (ALT 637 FOR MEDICARE OP, ALT 636 FOR OP/ED): Performed by: PHYSICIAN ASSISTANT

## 2024-02-22 PROCEDURE — 2500000005 HC RX 250 GENERAL PHARMACY W/O HCPCS

## 2024-02-22 PROCEDURE — 85027 COMPLETE CBC AUTOMATED: CPT | Performed by: PHYSICIAN ASSISTANT

## 2024-02-22 PROCEDURE — 2500000005 HC RX 250 GENERAL PHARMACY W/O HCPCS: Performed by: PHYSICIAN ASSISTANT

## 2024-02-22 PROCEDURE — 84550 ASSAY OF BLOOD/URIC ACID: CPT | Performed by: PHYSICIAN ASSISTANT

## 2024-02-22 PROCEDURE — 85007 BL SMEAR W/DIFF WBC COUNT: CPT | Performed by: PHYSICIAN ASSISTANT

## 2024-02-22 RX ORDER — ONDANSETRON HYDROCHLORIDE 2 MG/ML
8 INJECTION, SOLUTION INTRAVENOUS EVERY 6 HOURS PRN
Status: DISCONTINUED | OUTPATIENT
Start: 2024-02-22 | End: 2024-02-22 | Stop reason: HOSPADM

## 2024-02-22 RX ORDER — POTASSIUM CHLORIDE 20 MEQ/1
20 TABLET, EXTENDED RELEASE ORAL ONCE
Status: COMPLETED | OUTPATIENT
Start: 2024-02-22 | End: 2024-02-22

## 2024-02-22 RX ORDER — BACITRACIN ZINC 500 UNIT/G
OINTMENT IN PACKET (EA) TOPICAL 3 TIMES DAILY
Status: DISCONTINUED | OUTPATIENT
Start: 2024-02-22 | End: 2024-02-22 | Stop reason: HOSPADM

## 2024-02-22 RX ADMIN — ACYCLOVIR 400 MG: 400 TABLET ORAL at 08:42

## 2024-02-22 RX ADMIN — POTASSIUM CHLORIDE 20 MEQ: 1500 TABLET, EXTENDED RELEASE ORAL at 06:09

## 2024-02-22 RX ADMIN — LEVOFLOXACIN 750 MG: 500 TABLET, FILM COATED ORAL at 08:42

## 2024-02-22 RX ADMIN — BACITRACIN 1 APPLICATION: 500 OINTMENT TOPICAL at 17:25

## 2024-02-22 RX ADMIN — GABAPENTIN 300 MG: 300 CAPSULE ORAL at 08:42

## 2024-02-22 RX ADMIN — GABAPENTIN 300 MG: 300 CAPSULE ORAL at 13:00

## 2024-02-22 RX ADMIN — FLUCONAZOLE 400 MG: 200 TABLET ORAL at 08:42

## 2024-02-22 RX ADMIN — ONDANSETRON 8 MG: 2 INJECTION INTRAMUSCULAR; INTRAVENOUS at 13:00

## 2024-02-22 RX ADMIN — PROCHLORPERAZINE MALEATE 10 MG: 10 TABLET ORAL at 07:17

## 2024-02-22 RX ADMIN — POTASSIUM PHOSPHATE, MONOBASIC POTASSIUM PHOSPHATE, DIBASIC 15 MMOL: 224; 236 INJECTION, SOLUTION, CONCENTRATE INTRAVENOUS at 06:49

## 2024-02-22 RX ADMIN — METRONIDAZOLE 500 MG: 500 TABLET ORAL at 13:00

## 2024-02-22 RX ADMIN — METRONIDAZOLE 500 MG: 500 TABLET ORAL at 06:09

## 2024-02-22 RX ADMIN — ONDANSETRON HYDROCHLORIDE 8 MG: 8 TABLET, FILM COATED ORAL at 00:49

## 2024-02-22 RX ADMIN — PANTOPRAZOLE SODIUM 40 MG: 40 TABLET, DELAYED RELEASE ORAL at 06:09

## 2024-02-22 ASSESSMENT — COGNITIVE AND FUNCTIONAL STATUS - GENERAL
TURNING FROM BACK TO SIDE WHILE IN FLAT BAD: A LITTLE
DAILY ACTIVITIY SCORE: 19
WALKING IN HOSPITAL ROOM: A LITTLE
MOBILITY SCORE: 16
DRESSING REGULAR UPPER BODY CLOTHING: A LITTLE
PERSONAL GROOMING: A LITTLE
CLIMB 3 TO 5 STEPS WITH RAILING: TOTAL
HELP NEEDED FOR BATHING: A LITTLE
DRESSING REGULAR LOWER BODY CLOTHING: A LITTLE
TOILETING: A LITTLE
MOVING TO AND FROM BED TO CHAIR: A LITTLE
MOVING FROM LYING ON BACK TO SITTING ON SIDE OF FLAT BED WITH BEDRAILS: A LITTLE
STANDING UP FROM CHAIR USING ARMS: A LITTLE

## 2024-02-22 ASSESSMENT — PAIN SCALES - GENERAL
PAINLEVEL_OUTOF10: 0 - NO PAIN
PAINLEVEL_OUTOF10: 0 - NO PAIN

## 2024-02-22 ASSESSMENT — PAIN - FUNCTIONAL ASSESSMENT: PAIN_FUNCTIONAL_ASSESSMENT: 0-10

## 2024-02-22 NOTE — PROGRESS NOTES
02/06/24 1122     Discharge Planning   Living Arrangements Spouse/significant other   Support Systems Spouse/significant other;Children;Family members   Assistance Needed Pt needs some assistance with fine motor hand functions like opening packages.   Type of Residence Private residence   Number of Stairs to Enter Residence 0   Number of Stairs Within Residence 0   Home or Post Acute Services None   Patient expects to be discharged to: home   Does the patient need discharge transport arranged? No   Financial Resource Strain   How hard is it for you to pay for the very basics like food, housing, medical care, and heating? Not hard   Housing Stability   In the last 12 months, was there a time when you were not able to pay the mortgage or rent on time? N   In the last 12 months, how many places have you lived? 1   In the last 12 months, was there a time when you did not have a steady place to sleep or slept in a shelter (including now)? N   Transportation Needs   In the past 12 months, has lack of transportation kept you from medical appointments or from getting medications? no   In the past 12 months, has lack of transportation kept you from meetings, work, or from getting things needed for daily living? No      Pt with Multiple Myeloma was admitted for an Auto PBSCT, T=0 2/7/24. He will be returning home after discharge with the assistance of his wife and daughter, who will be his caregivers.  He is still independent,  just has some limitations of his right hand due to a prior surgery.  He states he has DME at home but isn't currently needing to use them.  He has a right internal jugular catheter which will be pulled prior to discharge.  His MD is Dr. Contreras/Vishal Harrell, [facility].  Will continue to follow for any home going needs.     2/13/24 at 16:20- Pt is T+ 6 s/p his Auto PBSCT, waiting on count recovery.  PT is recommending home care for PT- will discuss this with pt closer to time of discharge (~1 week).  Will  continue to follow for home going needs.  Maritza Aguirre.     2/22/24 at 13:40- Pt is T+15 s/p his Auto PBSCT and is now ready for discharge.  He will have his central line pulled today.  He was provided the Auto discharge packet and ED card.  Reviewed the discharge information and plan with him.  He is interested in having home care for PT- the provider will place a referral to [facility].  Maritza Aguirre RN, TCC

## 2024-02-22 NOTE — NURSING NOTE
Nursing Note   Patient discharged home. Prior discharge, after visit summary reviewed with patient and family member  and written copy after visit summary given to patient. Right subclavian central line removal site with occlusive dressing on dry and intact . No oozing from site at time of discharged.

## 2024-02-22 NOTE — CARE PLAN
The patient's goals for the shift include    Problem: Fall/Injury  Goal: Not fall by end of shift  Outcome: Progressing  Goal: Be free from injury by end of the shift  Outcome: Progressing  Goal: Verbalize understanding of personal risk factors for fall in the hospital  Outcome: Progressing  Goal: Verbalize understanding of risk factor reduction measures to prevent injury from fall in the home  Outcome: Progressing  Goal: Use assistive devices by end of the shift  Outcome: Progressing  Goal: Pace activities to prevent fatigue by end of the shift  Outcome: Progressing     Problem: Pain - Adult  Goal: Verbalizes/displays adequate comfort level or baseline comfort level  Outcome: Progressing     Problem: Safety - Adult  Goal: Free from fall injury  Outcome: Progressing     Problem: Discharge Planning  Goal: Discharge to home or other facility with appropriate resources  Outcome: Progressing       The clinical goals for the shift include pt will remain safe and free from injuries

## 2024-02-22 NOTE — DISCHARGE SUMMARY
Discharge Diagnosis  IgG multiple myeloma (CMS/HCC)    Issues Requiring Follow-Up  RUE Edema: please assess patient's RUE edema and determine if patient will require a Venous Duplex Sonographic exam to assess for DVT. Patient refused exam while inpatient due to it delaying his discharge on 2/22.     Test Results Pending At Discharge  Pending Labs       No current pending labs.            Hospital Course  David Ferreira is a 78 y.o. male with IgG Lodge Myeloma in [place] admitted for his Autologous SCT with Sonja 140 (T=0, 2/07/24).    Hospital course was complicated by:    1. chemotherapy-induced nausea and vomiting, managed with IV and oral anti-emetics, stable and tolerating PO at discharge  2. Diverticulitis, initially presented w/ abdominal pain, and demonstrated on CT AP. He was culture negative but treated w/ 7 days of IV zosyn and sent home on 7 days of flagy and levofloxacin.   3. Deconditioning, managed w/ PT while admitted, ordered home PT on discharge.  4. RUE edema 2/2 fluid overload and immobility of th R extremity due to a rotator cuff injury. Duplex sonography ordered while inpatient but cancelled at patient request due to delay in discharge. Patient and family reporting considerable improvement in RUE edema. Plan for outpatient US pending further evaluation by primary oncologist.      Will go home on acyclovir prophylaxis, and plan to start TMP/SMX at T+30.       He will follow up in twice weekly in the post-BMT clinic, starting on 2/24, and with Dr. Cerrato on 3/1.         Pertinent Physical Exam At Time of Discharge  Physical Exam  Constitutional:       Appearance: Normal appearance. He is normal weight.   HENT:      Head: Normocephalic.      Nose: Nose normal.      Mouth/Throat:      Mouth: Mucous membranes are moist.      Pharynx: Oropharynx is clear.   Eyes:      Conjunctiva/sclera: Conjunctivae normal.      Pupils: Pupils are equal, round, and reactive to light.   Cardiovascular:      Rate and  Rhythm: Normal rate and regular rhythm.      Pulses: Normal pulses.      Heart sounds: Normal heart sounds.   Pulmonary:      Effort: Pulmonary effort is normal.      Breath sounds: Normal breath sounds.   Abdominal:      General: Abdomen is flat. Bowel sounds are normal.      Palpations: Abdomen is soft.   Musculoskeletal:         General: Normal range of motion.      Cervical back: Neck supple.   Skin:     General: Skin is warm and dry.   Neurological:      General: No focal deficit present.      Mental Status: He is alert and oriented to person, place, and time. Mental status is at baseline.         Home Medications     Medication List      START taking these medications     DULoxetine 30 mg DR capsule; Commonly known as: Cymbalta; Take 1 capsule   (30 mg) by mouth once daily at bedtime. Do not crush or chew.   fluconazole 200 mg tablet; Commonly known as: Diflucan; Take 2 tablets   (400 mg) by mouth once daily for 14 days. Do not start before February 22, 2024.   levoFLOXacin 750 mg tablet; Commonly known as: Levaquin; Take 1 tablet   (750 mg) by mouth once every 24 hours for 7 doses. Do not start before   February 22, 2024.   loperamide 2 mg capsule; Commonly known as: Imodium; Take 1 capsule (2   mg) by mouth 4 times a day as needed for diarrhea for up to 10 days.   metroNIDAZOLE 500 mg tablet; Commonly known as: Flagyl; Take 1 tablet   (500 mg) by mouth every 8 hours for 10 days. Do not start before February 22, 2024.   ondansetron 8 mg tablet; Commonly known as: Zofran; Take 1 tablet (8 mg)   by mouth every 8 hours if needed for nausea for up to 7 days.   oxyCODONE 5 mg immediate release tablet; Commonly known as: Roxicodone;   Take 1 tablet (5 mg) by mouth every 6 hours if needed for severe pain (7 -   10) for up to 7 days.   pantoprazole 40 mg EC tablet; Commonly known as: ProtoNix; Take 1 tablet   (40 mg) by mouth once daily in the morning. Take before meals. Do not   crush, chew, or split. Do not  start before February 22, 2024.   prochlorperazine 10 mg tablet; Commonly known as: Compazine; Take 1   tablet (10 mg) by mouth every 6 hours if needed for nausea or vomiting for   up to 7 days.   sucralfate 100 mg/mL suspension; Commonly known as: Carafate; Take 10 mL   (1 g) by mouth every 6 hours if needed (Esophageal mucositis symptoms).     CONTINUE taking these medications     acyclovir 400 mg tablet; Commonly known as: Zovirax; Take 1 tablet (400   mg) by mouth every 12 hours.   capsaicin 0.025 % cream; Commonly known as: Zostrix; Apply topically as   needed at bedtime for mild pain (1 - 3).   gabapentin 300 mg capsule; Commonly known as: Neurontin; Take 1 capsule   (300 mg) by mouth once daily with breakfast AND 1 capsule (300 mg) once   daily at noon. Take with meals AND 2 capsules (600 mg) once daily at   bedtime.   melatonin 10 mg tablet; Take 1 tablet (10 mg) by mouth once daily at   bedtime.       Outpatient Follow-Up  Future Appointments   Date Time Provider Department Center   2/24/2024  9:00 [facility]   2/24/2024 10:00 [facility]   2/27/2024  8:00 AM [facility] SCT TREATMENT ROOM 8 [id number]   2/27/2024  9:00 AM [facility] 2F BMT POST TRANSPLANT [address] [facility]   3/1/2024  4:40 PM Abad Cerrato MD [id number]       Angel Stern PA-C

## 2024-02-23 ENCOUNTER — DOCUMENTATION (OUTPATIENT)
Dept: HOME HEALTH SERVICES | Facility: HOME HEALTH | Age: 79
End: 2024-02-23
Payer: MEDICARE

## 2024-02-23 NOTE — HH CARE COORDINATION
Home Care received a Referral for Physical Therapy. We have processed the referral for a Start of Care on 2/24-2/25.     If you have any questions or concerns, please feel free to contact us at [phone]. Follow the prompts, enter your five digit zip code, and you will be directed to your care team on E[facility] 3.

## 2024-02-23 NOTE — PROCEDURES
PROCEDURE: Removal of R neck Tralysis line    DATE: 02/22/24  TIME: 17:30    Procedure performed by Angel COYNE, Rj COYNE proctoring.      Pre-procedure and verification completed at bedside prior to procedure.      After handwashing was performed and sterile gloves were donned, the adhesive dressing was removed. The area of the line insertion was cleansed with chlorhexidine, and 2 external sutures were removed. The line was then removed without incident. Sterile drain sponge with bacitracin was applied with firm pressure, followed by a pressure dressing was applied. Patient was instructed to leave pressure dressing on for 24 hours.    Patient tolerated procedure well, no complications were noted.

## 2024-02-24 ENCOUNTER — OFFICE VISIT (OUTPATIENT)
Dept: HEMATOLOGY/ONCOLOGY | Facility: HOSPITAL | Age: 79
DRG: 016 | End: 2024-02-24
Payer: MEDICARE

## 2024-02-24 VITALS
WEIGHT: 169.7 LBS | TEMPERATURE: 96.8 F | RESPIRATION RATE: 18 BRPM | HEART RATE: 97 BPM | OXYGEN SATURATION: 99 % | SYSTOLIC BLOOD PRESSURE: 139 MMHG | DIASTOLIC BLOOD PRESSURE: 81 MMHG | BODY MASS INDEX: 28.24 KG/M2

## 2024-02-24 DIAGNOSIS — C90.00 MULTIPLE MYELOMA, REMISSION STATUS UNSPECIFIED (MULTI): Primary | ICD-10-CM

## 2024-02-24 DIAGNOSIS — G62.9 PERIPHERAL POLYNEUROPATHY: ICD-10-CM

## 2024-02-24 DIAGNOSIS — R60.0 EDEMA OF RIGHT UPPER EXTREMITY: ICD-10-CM

## 2024-02-24 DIAGNOSIS — E87.6 HYPOKALEMIA: ICD-10-CM

## 2024-02-24 DIAGNOSIS — M75.101 TEAR OF RIGHT ROTATOR CUFF, UNSPECIFIED TEAR EXTENT, UNSPECIFIED WHETHER TRAUMATIC: ICD-10-CM

## 2024-02-24 DIAGNOSIS — C90.00 MULTIPLE MYELOMA, REMISSION STATUS UNSPECIFIED (MULTI): ICD-10-CM

## 2024-02-24 DIAGNOSIS — K57.92 DIVERTICULITIS: ICD-10-CM

## 2024-02-24 DIAGNOSIS — D84.9 IMMUNOCOMPROMISED STATE (MULTI): ICD-10-CM

## 2024-02-24 DIAGNOSIS — C90.00 IGG MULTIPLE MYELOMA (MULTI): ICD-10-CM

## 2024-02-24 LAB
ALBUMIN SERPL BCP-MCNC: 3.1 G/DL (ref 3.4–5)
ALP SERPL-CCNC: 103 U/L (ref 33–136)
ALT SERPL W P-5'-P-CCNC: 13 U/L (ref 10–52)
ANION GAP SERPL CALC-SCNC: 12 MMOL/L (ref 10–20)
AST SERPL W P-5'-P-CCNC: 16 U/L (ref 9–39)
BASOPHILS # BLD MANUAL: 0 X10*3/UL (ref 0–0.1)
BASOPHILS NFR BLD MANUAL: 0 %
BILIRUB SERPL-MCNC: 0.5 MG/DL (ref 0–1.2)
BUN SERPL-MCNC: 8 MG/DL (ref 6–23)
BURR CELLS BLD QL SMEAR: ABNORMAL
CALCIUM SERPL-MCNC: 9.5 MG/DL (ref 8.6–10.6)
CHLORIDE SERPL-SCNC: 107 MMOL/L (ref 98–107)
CO2 SERPL-SCNC: 25 MMOL/L (ref 21–32)
CREAT SERPL-MCNC: 0.96 MG/DL (ref 0.5–1.3)
DACRYOCYTES BLD QL SMEAR: ABNORMAL
EGFRCR SERPLBLD CKD-EPI 2021: 81 ML/MIN/1.73M*2
EOSINOPHIL # BLD MANUAL: 0 X10*3/UL (ref 0–0.4)
EOSINOPHIL NFR BLD MANUAL: 0 %
ERYTHROCYTE [DISTWIDTH] IN BLOOD BY AUTOMATED COUNT: 16.8 % (ref 11.5–14.5)
GLUCOSE SERPL-MCNC: 104 MG/DL (ref 74–99)
HCT VFR BLD AUTO: 26.7 % (ref 41–52)
HGB BLD-MCNC: 8.9 G/DL (ref 13.5–17.5)
IMM GRANULOCYTES # BLD AUTO: 3.73 X10*3/UL (ref 0–0.5)
IMM GRANULOCYTES NFR BLD AUTO: 32.2 % (ref 0–0.9)
LDH SERPL L TO P-CCNC: 193 U/L (ref 84–246)
LYMPHOCYTES # BLD MANUAL: 0.35 X10*3/UL (ref 0.8–3)
LYMPHOCYTES NFR BLD MANUAL: 3 %
MAGNESIUM SERPL-MCNC: 1.77 MG/DL (ref 1.6–2.4)
MCH RBC QN AUTO: 33.1 PG (ref 26–34)
MCHC RBC AUTO-ENTMCNC: 33.3 G/DL (ref 32–36)
MCV RBC AUTO: 99 FL (ref 80–100)
METAMYELOCYTES # BLD MANUAL: 0.35 X10*3/UL
METAMYELOCYTES NFR BLD MANUAL: 3 %
MONOCYTES # BLD MANUAL: 0.46 X10*3/UL (ref 0.05–0.8)
MONOCYTES NFR BLD MANUAL: 4 %
MYELOCYTES # BLD MANUAL: 0.93 X10*3/UL
MYELOCYTES NFR BLD MANUAL: 8 %
NEUTROPHILS # BLD MANUAL: 9.52 X10*3/UL (ref 1.6–5.5)
NEUTS BAND # BLD MANUAL: 1.28 X10*3/UL (ref 0–0.5)
NEUTS BAND NFR BLD MANUAL: 11 %
NEUTS SEG # BLD MANUAL: 8.24 X10*3/UL (ref 1.6–5)
NEUTS SEG NFR BLD MANUAL: 71 %
NRBC BLD-RTO: 0.2 /100 WBCS (ref 0–0)
PLATELET # BLD AUTO: 84 X10*3/UL (ref 150–450)
POLYCHROMASIA BLD QL SMEAR: ABNORMAL
POTASSIUM SERPL-SCNC: 3.4 MMOL/L (ref 3.5–5.3)
PROT SERPL-MCNC: 5.2 G/DL (ref 6.4–8.2)
RBC # BLD AUTO: 2.69 X10*6/UL (ref 4.5–5.9)
RBC MORPH BLD: ABNORMAL
SCHISTOCYTES BLD QL SMEAR: ABNORMAL
SODIUM SERPL-SCNC: 141 MMOL/L (ref 136–145)
TOTAL CELLS COUNTED BLD: 100
WBC # BLD AUTO: 11.6 X10*3/UL (ref 4.4–11.3)

## 2024-02-24 PROCEDURE — 1111F DSCHRG MED/CURRENT MED MERGE: CPT

## 2024-02-24 PROCEDURE — 1160F RVW MEDS BY RX/DR IN RCRD: CPT

## 2024-02-24 PROCEDURE — 1159F MED LIST DOCD IN RCRD: CPT

## 2024-02-24 PROCEDURE — 83615 LACTATE (LD) (LDH) ENZYME: CPT

## 2024-02-24 PROCEDURE — 85007 BL SMEAR W/DIFF WBC COUNT: CPT

## 2024-02-24 PROCEDURE — 99215 OFFICE O/P EST HI 40 MIN: CPT

## 2024-02-24 PROCEDURE — 1036F TOBACCO NON-USER: CPT

## 2024-02-24 PROCEDURE — 80053 COMPREHEN METABOLIC PANEL: CPT

## 2024-02-24 PROCEDURE — 2500000004 HC RX 250 GENERAL PHARMACY W/ HCPCS (ALT 636 FOR OP/ED)

## 2024-02-24 PROCEDURE — 1126F AMNT PAIN NOTED NONE PRSNT: CPT

## 2024-02-24 PROCEDURE — 96366 THER/PROPH/DIAG IV INF ADDON: CPT | Mod: INF

## 2024-02-24 PROCEDURE — 96365 THER/PROPH/DIAG IV INF INIT: CPT | Mod: INF

## 2024-02-24 PROCEDURE — 85027 COMPLETE CBC AUTOMATED: CPT

## 2024-02-24 PROCEDURE — 83735 ASSAY OF MAGNESIUM: CPT

## 2024-02-24 RX ORDER — POTASSIUM CHLORIDE 750 MG/1
20 TABLET, FILM COATED, EXTENDED RELEASE ORAL DAILY
Qty: 28 TABLET | Refills: 0 | Status: SHIPPED | OUTPATIENT
Start: 2024-02-24 | End: 2024-02-27 | Stop reason: SDUPTHER

## 2024-02-24 RX ORDER — POTASSIUM CHLORIDE 14.9 MG/ML
20 INJECTION INTRAVENOUS ONCE
Status: COMPLETED | OUTPATIENT
Start: 2024-02-24 | End: 2024-02-24

## 2024-02-24 RX ORDER — POTASSIUM CHLORIDE 14.9 MG/ML
20 INJECTION INTRAVENOUS ONCE
Status: CANCELLED | OUTPATIENT
Start: 2024-02-24 | End: 2024-02-24

## 2024-02-24 RX ADMIN — POTASSIUM CHLORIDE 20 MEQ: 14.9 INJECTION, SOLUTION INTRAVENOUS at 09:34

## 2024-02-24 ASSESSMENT — ENCOUNTER SYMPTOMS
WOUND: 0
DYSURIA: 0
LEG SWELLING: 1
NUMBNESS: 0
COUGH: 0
ADENOPATHY: 0
SORE THROAT: 0
APPETITE CHANGE: 0
VOMITING: 0
DIARRHEA: 0
NAUSEA: 0
BRUISES/BLEEDS EASILY: 0
BLOOD IN STOOL: 0
DIAPHORESIS: 0
LIGHT-HEADEDNESS: 0
UNEXPECTED WEIGHT CHANGE: 0
PALPITATIONS: 0
WHEEZING: 0
HEMATURIA: 0
SHORTNESS OF BREATH: 0
DIZZINESS: 0
CONSTIPATION: 0
CHILLS: 0
FATIGUE: 1
FEVER: 0

## 2024-02-24 ASSESSMENT — PAIN SCALES - GENERAL: PAINLEVEL: 0-NO PAIN

## 2024-02-24 NOTE — ASSESSMENT & PLAN NOTE
# Diverticulitis  # LLQ tenderness in the setting of neutropenia without signs of sepsis (2/16/24)  - No fevers, tachycardia, or hypotension  - Bcx x 2 (2/16/24): negative  - Cdiff (2/19) negative  - CT abdomen pelvis w contrast (2/16/24): Diverticulitis of the sigmoid colon.   Culture negative, treated w/ 7 days of IV zosyn, and discharged home on 7 days of flagy and levofloxacin.

## 2024-02-24 NOTE — ASSESSMENT & PLAN NOTE
ONC  # IgG Kappa Multiple Myeloma  - Currently in [place]  - Autologous SCT (2/07/24) prepped with Melphalan 140mg/m2     CHEMO:   -Sonja 140 mg/m2, 267.4 mg on T-1     HEME  # Pancytopenia  :: Due to disease and chemotherapy   - Transfuse for Hgb < 7 g/dL, platelets < 10 k/uL  - Neupogen (2/12-2/21)  2/24/24:  No transfusion needs today.

## 2024-02-24 NOTE — PROGRESS NOTES
Patient ID: David Ferreira is a 78 y.o. male.    Subjective    HPI    David Ferreira is a 78 year old patient with IgG multiple myeloma s/p autologous SCT (T0=2/7/24).  Discharged from hospital on 2/22/24.  Today is day +17 from transplant.  David presents to the BMT post transplant clinic today (2/24/24) with his family for evaluation and count check.      Reports he is doing well overall.  States he has fatigue.  Appetite is good.  Drinking about 8 cups of water per day.  No weight loss.  Tolerating all his medications okay.  Daughter is helping to fill medication box.      Reports right shoulder pain from rotator cuff tear.  Uses capsaicin as needed, hasn't been taking oxycodone.  States edema to right arm is slowly improving. Reports no warmth, tenderness, redness to forearm or hand.  Continues to have swelling to bilateral legs.      Review of Systems   Constitutional:  Positive for fatigue. Negative for appetite change, chills, diaphoresis, fever and unexpected weight change.   HENT:   Negative for mouth sores and sore throat.    Respiratory:  Negative for cough, shortness of breath and wheezing.    Cardiovascular:  Positive for leg swelling (bilateral legs). Negative for chest pain and palpitations.   Gastrointestinal:  Negative for blood in stool, constipation, diarrhea, nausea and vomiting.   Genitourinary:  Negative for dysuria and hematuria.    Musculoskeletal:  Negative for gait problem.   Skin:  Negative for rash and wound.   Neurological:  Negative for dizziness, gait problem, light-headedness and numbness.   Hematological:  Negative for adenopathy. Does not bruise/bleed easily.       Objective       Physical Exam  Vitals reviewed.   Constitutional:       Appearance: Normal appearance.   HENT:      Head: Normocephalic.      Mouth/Throat:      Mouth: Mucous membranes are moist.      Comments: Small purple discoloration to tip of tongue (patient reports this occurs intermittently)    Eyes:      Pupils:  Pupils are equal, round, and reactive to light.   Cardiovascular:      Rate and Rhythm: Normal rate and regular rhythm.      Pulses: Normal pulses.      Heart sounds: Normal heart sounds. No murmur heard.     No friction rub. No gallop.   Pulmonary:      Effort: Pulmonary effort is normal. No respiratory distress.      Breath sounds: Normal breath sounds. No wheezing.   Abdominal:      General: Abdomen is flat. Bowel sounds are normal. There is no distension.      Palpations: Abdomen is soft. There is no mass.      Tenderness: There is no abdominal tenderness.   Musculoskeletal:         General: Swelling (Right hand, non-pitting) present. Normal range of motion.      Right lower leg: Edema (1+) present.      Left lower leg: Edema (1+) present.   Lymphadenopathy:      Comments: No lymphadenopathy   Skin:     General: Skin is warm and dry.      Comments: No erythema, tenderness, swelling, drainage to site of removed right trialysis catheter    Neurological:      General: No focal deficit present.      Mental Status: He is alert and oriented to person, place, and time.   Psychiatric:         Mood and Affect: Mood normal.         Behavior: Behavior normal.       Performance Status:  ECOG Performance Status: 1 restricted from physically strenuous work    PMHx: Peripheral neuropathy bilateral LE's 2016 on gabapentin   Rotator cuff tear right.  ~6 month ago      PSHx: CTS repair bilateral: right ~ 2 years ago, left 3-4 months ago     Fhx: mother with colon cancer at 74  Father with CAD at 59    Assessment/Plan     Oncology History   Multiple myeloma (CMS/HCC)   11/5/2023 Initial Diagnosis    Multiple myeloma (CMS/HCC)     2/6/2024 -  Bone Marrow Transplant         Problem List Items Addressed This Visit             ICD-10-CM    Multiple myeloma (CMS/HCC) - Primary C90.00    Relevant Orders    CBC and Auto Differential (Completed)    Comprehensive Metabolic Panel (Completed)    Lactate dehydrogenase (Completed)    Magnesium  (Completed)    Infusion Appointment Request Psychiatric STEM CELL    Infusion Appointment Request Psychiatric STEM CELL    Clinic Appointment Request Psychiatric BMT POST TRANSPLANT    Infusion Appointment Request Psychiatric STEM CELL    Clinic Appointment Request Psychiatric BMT POST TRANSPLANT    Infusion Appointment Request Psychiatric STEM CELL    Clinic Appointment Request Psychiatric BMT POST TRANSPLANT    Peripheral neuropathy G62.9     # Peripheral neuropathy B/l LE and 2/2 B/L carpal tunnel syndrome s/p repair  - Continue gabapentin and Cymbalta 30mg         IgG multiple myeloma (CMS/[facility]) C90.00     ONC  # IgG Kappa Multiple Myeloma  - Currently in [place]  - Autologous SCT (2/07/24) prepped with Melphalan 140mg/m2     CHEMO:   -Sonja 140 mg/m2, 267.4 mg on T-1     HEME  # Pancytopenia  :: Due to disease and chemotherapy   - Transfuse for Hgb < 7 g/dL, platelets < 10 k/uL  - Neupogen (2/12-2/21)  2/24/24:  No transfusion needs today.         Hypokalemia E87.6     2/24/24:  Potassium 3.4.  Infused 10 meq potassium chloride.  Patient requesting to not have to obtain IV potassium replacement.  Prescribed oral potassium chloride 20 meq daily to help decrease need for IV replacement.  Explained need for IV replacement depending on potassium results.           Relevant Medications    potassium chloride CR (Klor-Con) 10 mEq ER tablet    Immunocompromised state (CMS/[facility]) D84.9     # Prophylaxis  - ACV, Fluconazole         Diverticulitis K57.92     # Diverticulitis  # LLQ tenderness in the setting of neutropenia without signs of sepsis (2/16/24)  - No fevers, tachycardia, or hypotension  - Bcx x 2 (2/16/24): negative  - Cdiff (2/19) negative  - CT abdomen pelvis w contrast (2/16/24): Diverticulitis of the sigmoid colon.   Culture negative, treated w/ 7 days of IV zosyn, and discharged home on 7 days of flagy and levofloxacin.          Right rotator cuff tear M75.101     - Continue Capsaicin and oxycodone as needed (patient reports he is not requiring  oxycodone)  #Abnormal CT finding  - CT A/P 2/17: diffuse thickening and edema surrounding bilateral psoas  muscles, gluteal muscles, and quadratus femoris muscles. There is  diffuse periarticular soft tissue thickening likely related to  synovial/capsular thickening (right > left). There is also diffuse  soft tissue thickening along the posterior aspects of the greater  trochanters (right > left); for example, there is of 4.4 cm x 4 cm  area of heterogeneous soft tissue posterior to the right greater  trochanter with scattered foci of calcification that could be related  to calcium hydroxy appetite deposition disease.   - Radiology recommending further assessment with MRI pelvis w/ and w/o contrast.         Edema of right upper extremity:  RUE Edema: Patient refused Venous Duplex Sonographic testing to rule out DVT while inpatient due to it delaying his discharge on 2/22.   2/24/24:  Patient and family reports that his edema is improving.  Forearm swelling is almost resolved with mild swelling to right hand.  No erythema, tenderness, or warmth noted on examination today.  Advised patient to contact if swelling worsens or develops erythema, tenderness, or warmth of right arm.  Encouraged patient to keep right arm elevated.      RTC:  2/27/24:  Appointment with BMT post transplant and infusion  3/1/24:  Appointment with Dr. Cerrato    Requested appointments for:   3/1/24: infusion 2nd floor SCT  3/5/24, 3/8/24. And 3/12/24:  infusion 2nd floor SCT and BMT post transplant.    **Unable to schedule infusion appt with charge nurse in the next week as 2nd floor is not open on the weekend, please get approval at next appt for 3/1/24 infusion appt.       Pooja Leon, APRN-CNP  [facility] BMT POST TRANSPLANT

## 2024-02-24 NOTE — ASSESSMENT & PLAN NOTE
RUE Edema: Patient refused Venous Duplex Sonographic testing to rule out DVT while inpatient due to it delaying his discharge on 2/22.   2/24/24:  Patient and family reports that his edema is improving.  Forearm swelling is almost resolved with mild swelling to right hand.  No erythema, tenderness, or warmth noted on examination today.  Advised patient to contact if swelling worsens or develops erythema, tenderness, or warmth of right arm.  Encouraged patient to keep right arm elevated.

## 2024-02-24 NOTE — ASSESSMENT & PLAN NOTE
- Continue Capsaicin and oxycodone as needed (patient reports he is not requiring oxycodone)  #Abnormal CT finding  - CT A/P 2/17: diffuse thickening and edema surrounding bilateral psoas  muscles, gluteal muscles, and quadratus femoris muscles. There is  diffuse periarticular soft tissue thickening likely related to  synovial/capsular thickening (right > left). There is also diffuse  soft tissue thickening along the posterior aspects of the greater  trochanters (right > left); for example, there is of 4.4 cm x 4 cm  area of heterogeneous soft tissue posterior to the right greater  trochanter with scattered foci of calcification that could be related  to calcium hydroxy appetite deposition disease.   - Radiology recommending further assessment with MRI pelvis w/ and w/o contrast.

## 2024-02-24 NOTE — ASSESSMENT & PLAN NOTE
2/24/24:  Potassium 3.4.  Infused 10 meq potassium chloride.    Prescribed oral potassium chloride 20 meq daily.

## 2024-02-24 NOTE — ASSESSMENT & PLAN NOTE
# Peripheral neuropathy B/l LE and 2/2 B/L carpal tunnel syndrome s/p repair  - Continue gabapentin and Cymbalta 30mg

## 2024-02-24 NOTE — PROGRESS NOTES
Patient arrived to infusion for first outpatient count check following Auto BMT. Patients only complaints are feeling tired and edema (RUE and bilateral LE's). Pooja Leon NP aware. PIV placed in L AC per patient request. 20 meq of potassium given IV. Patient discharged in stable condition with family.

## 2024-02-27 ENCOUNTER — TREATMENT (OUTPATIENT)
Dept: OTHER | Facility: HOSPITAL | Age: 79
End: 2024-02-27
Payer: MEDICARE

## 2024-02-27 ENCOUNTER — OFFICE VISIT (OUTPATIENT)
Dept: OTHER | Facility: HOSPITAL | Age: 79
End: 2024-02-27
Payer: MEDICARE

## 2024-02-27 VITALS
HEART RATE: 95 BPM | BODY MASS INDEX: 27.51 KG/M2 | TEMPERATURE: 99.5 F | SYSTOLIC BLOOD PRESSURE: 149 MMHG | RESPIRATION RATE: 18 BRPM | WEIGHT: 165.34 LBS | OXYGEN SATURATION: 97 % | DIASTOLIC BLOOD PRESSURE: 75 MMHG

## 2024-02-27 DIAGNOSIS — Z94.84 STEM CELLS TRANSPLANT STATUS (MULTI): ICD-10-CM

## 2024-02-27 DIAGNOSIS — C90.00 MULTIPLE MYELOMA, REMISSION STATUS UNSPECIFIED (MULTI): ICD-10-CM

## 2024-02-27 DIAGNOSIS — E87.6 HYPOKALEMIA: ICD-10-CM

## 2024-02-27 DIAGNOSIS — C90.00 MULTIPLE MYELOMA NOT HAVING ACHIEVED REMISSION (MULTI): Primary | ICD-10-CM

## 2024-02-27 LAB
ABO GROUP (TYPE) IN BLOOD: NORMAL
ALBUMIN SERPL BCP-MCNC: 3.2 G/DL (ref 3.4–5)
ALP SERPL-CCNC: 84 U/L (ref 33–136)
ALT SERPL W P-5'-P-CCNC: 22 U/L (ref 10–52)
ANION GAP SERPL CALC-SCNC: 13 MMOL/L (ref 10–20)
ANTIBODY SCREEN: NORMAL
APTT PPP: 31 SECONDS (ref 27–38)
AST SERPL W P-5'-P-CCNC: 36 U/L (ref 9–39)
BASOPHILS # BLD MANUAL: 0 X10*3/UL (ref 0–0.1)
BASOPHILS NFR BLD MANUAL: 0 %
BILIRUB SERPL-MCNC: 0.4 MG/DL (ref 0–1.2)
BUN SERPL-MCNC: 9 MG/DL (ref 6–23)
BURR CELLS BLD QL SMEAR: ABNORMAL
CALCIUM SERPL-MCNC: 9.3 MG/DL (ref 8.6–10.3)
CHLORIDE SERPL-SCNC: 106 MMOL/L (ref 98–107)
CO2 SERPL-SCNC: 24 MMOL/L (ref 21–32)
CREAT SERPL-MCNC: 0.83 MG/DL (ref 0.5–1.3)
DACRYOCYTES BLD QL SMEAR: ABNORMAL
EGFRCR SERPLBLD CKD-EPI 2021: 90 ML/MIN/1.73M*2
EOSINOPHIL # BLD MANUAL: 0.07 X10*3/UL (ref 0–0.4)
EOSINOPHIL NFR BLD MANUAL: 1 %
ERYTHROCYTE [DISTWIDTH] IN BLOOD BY AUTOMATED COUNT: 17.5 % (ref 11.5–14.5)
FIBRINOGEN PPP-MCNC: 184 MG/DL (ref 200–400)
GLUCOSE SERPL-MCNC: 102 MG/DL (ref 74–99)
HCT VFR BLD AUTO: 27.2 % (ref 41–52)
HGB BLD-MCNC: 9.1 G/DL (ref 13.5–17.5)
IGG SERPL-MCNC: 868 MG/DL (ref 700–1600)
IMM GRANULOCYTES # BLD AUTO: 1.96 X10*3/UL (ref 0–0.5)
IMM GRANULOCYTES NFR BLD AUTO: 28.2 % (ref 0–0.9)
INR PPP: 1.4 (ref 0.9–1.1)
LYMPHOCYTES # BLD MANUAL: 0.07 X10*3/UL (ref 0.8–3)
LYMPHOCYTES NFR BLD MANUAL: 1 %
MAGNESIUM SERPL-MCNC: 1.82 MG/DL (ref 1.6–2.4)
MCH RBC QN AUTO: 33.5 PG (ref 26–34)
MCHC RBC AUTO-ENTMCNC: 33.5 G/DL (ref 32–36)
MCV RBC AUTO: 100 FL (ref 80–100)
METAMYELOCYTES # BLD MANUAL: 0.7 X10*3/UL
METAMYELOCYTES NFR BLD MANUAL: 10 %
MONOCYTES # BLD MANUAL: 0.91 X10*3/UL (ref 0.05–0.8)
MONOCYTES NFR BLD MANUAL: 13 %
MYELOCYTES # BLD MANUAL: 0.21 X10*3/UL
MYELOCYTES NFR BLD MANUAL: 3 %
NEUTROPHILS # BLD MANUAL: 5.04 X10*3/UL (ref 1.6–5.5)
NEUTS BAND # BLD MANUAL: 1.26 X10*3/UL (ref 0–0.5)
NEUTS BAND NFR BLD MANUAL: 18 %
NEUTS SEG # BLD MANUAL: 3.78 X10*3/UL (ref 1.6–5)
NEUTS SEG NFR BLD MANUAL: 54 %
NRBC BLD-RTO: 0.3 /100 WBCS (ref 0–0)
PLATELET # BLD AUTO: 120 X10*3/UL (ref 150–450)
POLYCHROMASIA BLD QL SMEAR: ABNORMAL
POTASSIUM SERPL-SCNC: 3.2 MMOL/L (ref 3.5–5.3)
PROT SERPL-MCNC: 5.3 G/DL (ref 6.4–8.2)
PROTHROMBIN TIME: 15.9 SECONDS (ref 9.8–12.8)
RBC # BLD AUTO: 2.72 X10*6/UL (ref 4.5–5.9)
RBC MORPH BLD: ABNORMAL
RH FACTOR (ANTIGEN D): NORMAL
SCHISTOCYTES BLD QL SMEAR: ABNORMAL
SODIUM SERPL-SCNC: 140 MMOL/L (ref 136–145)
TOTAL CELLS COUNTED BLD: 100
URATE SERPL-MCNC: 6.5 MG/DL (ref 4–7.5)
WBC # BLD AUTO: 7 X10*3/UL (ref 4.4–11.3)

## 2024-02-27 PROCEDURE — 85027 COMPLETE CBC AUTOMATED: CPT

## 2024-02-27 PROCEDURE — 1126F AMNT PAIN NOTED NONE PRSNT: CPT

## 2024-02-27 PROCEDURE — 1160F RVW MEDS BY RX/DR IN RCRD: CPT

## 2024-02-27 PROCEDURE — 99215 OFFICE O/P EST HI 40 MIN: CPT

## 2024-02-27 PROCEDURE — 83735 ASSAY OF MAGNESIUM: CPT

## 2024-02-27 PROCEDURE — 82784 ASSAY IGA/IGD/IGG/IGM EACH: CPT

## 2024-02-27 PROCEDURE — 36415 COLL VENOUS BLD VENIPUNCTURE: CPT

## 2024-02-27 PROCEDURE — 86870 RBC ANTIBODY IDENTIFICATION: CPT

## 2024-02-27 PROCEDURE — 85384 FIBRINOGEN ACTIVITY: CPT

## 2024-02-27 PROCEDURE — 86901 BLOOD TYPING SEROLOGIC RH(D): CPT

## 2024-02-27 PROCEDURE — 1036F TOBACCO NON-USER: CPT

## 2024-02-27 PROCEDURE — 80053 COMPREHEN METABOLIC PANEL: CPT

## 2024-02-27 PROCEDURE — 1111F DSCHRG MED/CURRENT MED MERGE: CPT

## 2024-02-27 PROCEDURE — 84550 ASSAY OF BLOOD/URIC ACID: CPT

## 2024-02-27 PROCEDURE — 85007 BL SMEAR W/DIFF WBC COUNT: CPT

## 2024-02-27 PROCEDURE — 85610 PROTHROMBIN TIME: CPT

## 2024-02-27 PROCEDURE — 1159F MED LIST DOCD IN RCRD: CPT

## 2024-02-27 RX ORDER — POTASSIUM CHLORIDE 750 MG/1
20 TABLET, FILM COATED, EXTENDED RELEASE ORAL 2 TIMES DAILY
Qty: 56 TABLET | Refills: 0 | Status: SHIPPED | OUTPATIENT
Start: 2024-02-27 | End: 2024-02-27 | Stop reason: SDUPTHER

## 2024-02-27 RX ORDER — SULFAMETHOXAZOLE AND TRIMETHOPRIM 800; 160 MG/1; MG/1
1 TABLET ORAL 3 TIMES WEEKLY
Qty: 12 TABLET | Refills: 2 | Status: SHIPPED | OUTPATIENT
Start: 2024-02-28 | End: 2024-03-11 | Stop reason: ALTCHOICE

## 2024-02-27 RX ORDER — POTASSIUM CHLORIDE 750 MG/1
20 TABLET, FILM COATED, EXTENDED RELEASE ORAL 2 TIMES DAILY
Qty: 56 TABLET | Refills: 0 | Status: SHIPPED | OUTPATIENT
Start: 2024-02-27 | End: 2024-03-11 | Stop reason: SDUPTHER

## 2024-02-27 ASSESSMENT — ENCOUNTER SYMPTOMS
FATIGUE: 1
FEVER: 0
SHORTNESS OF BREATH: 0
UNEXPECTED WEIGHT CHANGE: 0
COUGH: 0
SORE THROAT: 0
NAUSEA: 0
ADENOPATHY: 0
VOMITING: 0
LEG SWELLING: 1
DIARRHEA: 0
LIGHT-HEADEDNESS: 0
DYSURIA: 0
NUMBNESS: 0
CHILLS: 0
CONSTIPATION: 0
WHEEZING: 0
HEMATURIA: 0
DIAPHORESIS: 0
WOUND: 0
BLOOD IN STOOL: 0
DIZZINESS: 0
APPETITE CHANGE: 0
PALPITATIONS: 0
BRUISES/BLEEDS EASILY: 0

## 2024-02-27 NOTE — PROGRESS NOTES
ONCOLOGY PHARMACY MEDICATION EDUCATION NOTE   David Ferreira is a 78 y.o. male currently day + 20 following autologous stem cell transplant for a diagnosis of multiple myeloma. Pharmacist was consulted to provide home medication education.     Objective  There were no vitals taken for this visit.  Lab Results   Component Value Date    WBC 7.0 02/27/2024    HGB 9.1 (L) 02/27/2024    HCT 27.2 (L) 02/27/2024     02/27/2024     (L) 02/27/2024      Lab Results   Component Value Date    GLUCOSE 102 (H) 02/27/2024    CALCIUM 9.3 02/27/2024     02/27/2024    K 3.2 (L) 02/27/2024    CO2 24 02/27/2024     02/27/2024    BUN 9 02/27/2024    CREATININE 0.83 02/27/2024     Lab Results   Component Value Date    ALT 22 02/27/2024    AST 36 02/27/2024    ALKPHOS 84 02/27/2024    BILITOT 0.4 02/27/2024     Allergies and Medications   No Known Allergies    Current Outpatient Medications:     acyclovir (Zovirax) 400 mg tablet, Take 1 tablet (400 mg) by mouth every 12 hours., Disp: 60 tablet, Rfl: 2    capsaicin (Zostrix) 0.025 % cream, Apply topically as needed at bedtime for mild pain (1 - 3)., Disp: 56.6 g, Rfl: 2    DULoxetine (Cymbalta) 30 mg DR capsule, Take 1 capsule (30 mg) by mouth once daily at bedtime. Do not crush or chew., Disp: 30 capsule, Rfl: 11    gabapentin (Neurontin) 300 mg capsule, Take 1 capsule (300 mg) by mouth once daily with breakfast AND 1 capsule (300 mg) once daily at noon. Take with meals AND 2 capsules (600 mg) once daily at bedtime., Disp: 120 capsule, Rfl: 2    levoFLOXacin (Levaquin) 750 mg tablet, Take 1 tablet (750 mg) by mouth once every 24 hours for 7 doses. Do not start before February 22, 2024., Disp: 7 tablet, Rfl: 0    loperamide (Imodium) 2 mg capsule, Take 1 capsule (2 mg) by mouth 4 times a day as needed for diarrhea for up to 10 days., Disp: 30 capsule, Rfl: 0    melatonin 10 mg tablet, Take 1 tablet (10 mg) by mouth once daily at bedtime., Disp: 30 tablet, Rfl:  2    metroNIDAZOLE (Flagyl) 500 mg tablet, Take 1 tablet (500 mg) by mouth every 8 hours for 10 days. Do not start before February 22, 2024., Disp: 30 tablet, Rfl: 0    ondansetron (Zofran) 8 mg tablet, Take 1 tablet (8 mg) by mouth every 8 hours if needed for nausea for up to 7 days., Disp: 20 tablet, Rfl: 0    pantoprazole (ProtoNix) 40 mg EC tablet, Take 1 tablet (40 mg) by mouth once daily in the morning. Take before meals. Do not crush, chew, or split. Do not start before February 22, 2024., Disp: 30 tablet, Rfl: 11    prochlorperazine (Compazine) 10 mg tablet, Take 1 tablet (10 mg) by mouth every 6 hours if needed for nausea or vomiting for up to 7 days., Disp: 30 tablet, Rfl: 0    potassium chloride CR (Klor-Con) 10 mEq ER tablet, Take 2 tablets (20 mEq) by mouth 2 times a day. Do not crush, chew, or split., Disp: 56 tablet, Rfl: 0    [START ON 2/28/2024] sulfamethoxazole-trimethoprim (Bactrim DS) 800-160 mg tablet, Take 1 tablet by mouth 3 times a week. Take on Mondays, Wednesdays, and Fridays. Do not start until instructed., Disp: 12 tablet, Rfl: 2    Medication Education: Education was provided regarding all home medication changes. In addition, patient was given written daily medication schedules. The schedule was discussed in detail with the patient, including drug name, use and dose, and the appropriate timing of self-administration.   - Medication changes: increased potassium, started bactrim, stopped fluconazole (no longer neutropenic)    The patient demonstrated Level of Understanding : Poor for their current medication list. Daughter fills pill box at home, but she was not present at the visit this morning. Recommended the patient bring in all medication and pill box to next appointment.      Maritza Willoughby, PharmD, [facility]  Ambulatory Stem Cell Transplant Transplant Pharmacist

## 2024-02-27 NOTE — PROGRESS NOTES
Patient ID: David Ferreira is a 78 y.o. male.    Subjective      David Ferreira is a 78 year old patient with IgG multiple myeloma s/p autologous SCT (T0=2/7/24).  Discharged from hospital on 2/22/24.  Today is day +20 from transplant.  He presents accompanied by wife and son.     Continues to have some fatigue. States that right arm swelling continues to improve. Encouraged patient to ambulate more frequently, especially when the weather is nicer, to help reduce lower extremity edema. His only pain is of hte right shoulder at site of previous rotator cuff tear.     Eating and drinking adequately.     Review of Systems   Constitutional:  Positive for fatigue. Negative for appetite change, chills, diaphoresis, fever and unexpected weight change.   HENT:   Negative for mouth sores and sore throat.    Respiratory:  Negative for cough, shortness of breath and wheezing.    Cardiovascular:  Positive for leg swelling (bilateral legs). Negative for chest pain and palpitations.   Gastrointestinal:  Negative for blood in stool, constipation, diarrhea, nausea and vomiting.   Genitourinary:  Negative for dysuria and hematuria.    Musculoskeletal:  Negative for gait problem.   Skin:  Negative for rash and wound.   Neurological:  Negative for dizziness, gait problem, light-headedness and numbness.   Hematological:  Negative for adenopathy. Does not bruise/bleed easily.       Objective       Physical Exam  Vitals reviewed.   Constitutional:       Appearance: Normal appearance.   HENT:      Head: Normocephalic.      Mouth/Throat:      Mouth: Mucous membranes are moist.      Comments: Small purple discoloration to tip of tongue (patient reports this occurs intermittently)    Eyes:      Pupils: Pupils are equal, round, and reactive to light.   Cardiovascular:      Rate and Rhythm: Normal rate and regular rhythm.      Pulses: Normal pulses.      Heart sounds: Normal heart sounds. No murmur heard.     No friction rub. No gallop.    Pulmonary:      Effort: Pulmonary effort is normal. No respiratory distress.      Breath sounds: Normal breath sounds. No wheezing.   Abdominal:      General: Abdomen is flat. Bowel sounds are normal. There is no distension.      Palpations: Abdomen is soft. There is no mass.      Tenderness: There is no abdominal tenderness.   Musculoskeletal:         General: Swelling (Right hand, non-pitting) present. Normal range of motion.      Right lower leg: Edema (1+) present.      Left lower leg: Edema (1+) present.   Lymphadenopathy:      Comments: No lymphadenopathy   Skin:     General: Skin is warm and dry.      Comments: No erythema, tenderness, swelling, drainage to site of removed right trialysis catheter    Neurological:      General: No focal deficit present.      Mental Status: He is alert and oriented to person, place, and time.   Psychiatric:         Mood and Affect: Mood normal.         Behavior: Behavior normal.       Performance Status:   Karnofsky Score: 80 - Normal activity with effort; some signs or symptoms of disease     PMHx: Peripheral neuropathy bilateral LE's 2016 on gabapentin   Rotator cuff tear right.  ~6 month ago      PSHx: CTS repair bilateral: right ~ 2 years ago, left 3-4 months ago     Fhx: mother with colon cancer at 74  Father with CAD at 59    Assessment/Plan     Oncology History   Multiple myeloma (CMS/HCC)   11/5/2023 Initial Diagnosis    Multiple Myeloma: IgG kappa. ISS stage II  - Bone marrow 30-40% kappa restricted plasma cells  - Fish negative   - CT/PET: negative  - b2M 3.8   - normal Ca++, Cr, LDH, alb  - VRd started in 3/2023 then switched to D-Kd in late May 23. tolerating chemo well.  - M protein 3.8>3.1>3.0>2.9>2.8>2.1>1.7>1.3  - IgG 6072>>4098>2480>>2211>1700>1480  - Kappa LC 1944 mg/L>>920>685>435  - K/L ratio 589>438>>>457  - Partial response after ~ 8 months Rx  - Bone marrow 11/9/23  5-10% kappa restricted plasma cells.     2/7/2024 -  Bone Marrow Transplant     Collected on 1/29/24, only required 1 day.     Admitted on 2/5/24,  T=0, 2/7/24 for autologous transplant prepped with Melphalan 140 mg/m2.     Hospitalization complicated by:  1. chemotherapy-induced nausea and vomiting, managed with IV and oral anti-emetics, stable and tolerating PO at discharge  2. Diverticulitis, initially presented w/ abdominal pain, and demonstrated on CT AP. He was culture negative but treated w/ 7 days of IV zosyn and sent home on 7 days of flagy and levofloxacin.   3. Deconditioning, managed w/ PT while admitted, ordered home PT on discharge.  4. RUE edema 2/2 fluid overload and immobility of th R extremity due to a rotator cuff injury. Duplex sonography ordered while inpatient but cancelled at patient request due to delay in discharge. Patient and family reporting considerable improvement in RUE edema. Plan for outpatient US pending further evaluation by primary oncologist.         Electrolyte abnormalities  - K+ was 3.2 on today's labs, has not picked up KCl prescription yet. Only has PIV, instructed to take 2 20 mEq tablets (20 mEq) BID    Immunocompromised state s/p stem cell transplant  - Okay to stop fluconazole, no longer neutropenic  - Continue acyclovir 400 mg BID   - Plan Bactrim DS M/W/F day , prescription sent today--will start when he picks up     Diverticulitis  LLQ tenderness in the setting of neutropenia without signs of sepsis (2/16/24)  - No fevers, tachycardia, or hypotension  - Bcx x 2 (2/16/24): negative  - Cdiff (2/19) negative  - CT abdomen pelvis w contrast (2/16/24): Diverticulitis of the sigmoid colon.   Culture negative, treated w/ 7 days of IV Zosyn  - Discharged home on 7 days of PO Flagyl and levofloxacin, stop day 2/29    Edema of right upper extremity  RUE Edema: Patient refused Venous Duplex Sonographic testing to rule out DVT while inpatient due to it delaying his discharge on 2/22.   - Per patient, edema is improving. Encouraged patient to keep right  arm elevated.      Access: PIV    RTC:  3/1/24 BMT-post; 3/5/24 with Dr. Contreras; 3/8/24 BMT-post        ERNESTO Galo-CNP

## 2024-02-28 ENCOUNTER — HOME CARE VISIT (OUTPATIENT)
Dept: HOME HEALTH SERVICES | Facility: HOME HEALTH | Age: 79
End: 2024-02-28

## 2024-02-28 NOTE — PROGRESS NOTES
Patient arrived to SCT unit via independent ambulation. Vitals taken. 22 G peripheral IV placed in the  R FA, BBR noted and labs drawn. Gregory NP up to speak with patient. Patient provided printed copy of lab work. IV removed by Linda BERGERON, dressing applied. Patient left unit via independent ambulation with no further needs or assistance.

## 2024-03-01 ENCOUNTER — APPOINTMENT (OUTPATIENT)
Dept: OTHER | Facility: HOSPITAL | Age: 79
End: 2024-03-01
Payer: MEDICARE

## 2024-03-01 ENCOUNTER — APPOINTMENT (OUTPATIENT)
Dept: HEMATOLOGY/ONCOLOGY | Facility: HOSPITAL | Age: 79
End: 2024-03-01
Payer: MEDICARE

## 2024-03-04 ENCOUNTER — HOME CARE VISIT (OUTPATIENT)
Dept: HOME HEALTH SERVICES | Facility: HOME HEALTH | Age: 79
End: 2024-03-04
Payer: MEDICARE

## 2024-03-04 ENCOUNTER — NURSE TRIAGE (OUTPATIENT)
Dept: ADMISSION | Facility: HOSPITAL | Age: 79
End: 2024-03-04
Payer: MEDICARE

## 2024-03-04 VITALS
HEART RATE: 81 BPM | SYSTOLIC BLOOD PRESSURE: 122 MMHG | DIASTOLIC BLOOD PRESSURE: 78 MMHG | TEMPERATURE: 76.1 F | OXYGEN SATURATION: 98 %

## 2024-03-04 PROCEDURE — 0023 HH SOC

## 2024-03-04 PROCEDURE — G0151 HHCP-SERV OF PT,EA 15 MIN: HCPCS

## 2024-03-04 ASSESSMENT — ACTIVITIES OF DAILY LIVING (ADL)
OASIS_M1830: 01
ENTERING_EXITING_HOME: ONE PERSON

## 2024-03-04 ASSESSMENT — ENCOUNTER SYMPTOMS: DENIES PAIN: 1

## 2024-03-04 NOTE — TELEPHONE ENCOUNTER
I called David and left him a detailed VM (per his request). I told him that per Dr. Contreras he should hold the Pantoprazole and continue applying the Hydrocortisone cream. She will see him tomorrow.

## 2024-03-04 NOTE — TELEPHONE ENCOUNTER
David is s/p autologous SCT (T0=2/7/24). He called in to report a new rash on his forehead/upper cheekbones and around his eyes.  He first noticed the rash three days ago; the rash is not spreading.   The rash it itchy and does cause a burning sensation.   Patient states it looks like a sunburn.   It is flat and red.   He did apply Cortisone cream this morning which did take away the itching.   He did start Pantoprazole 40mg about three days ago; this is the only new medication though he is unsure if he developed the rash before or after starting the Pantoprazole.   He has a FUV with Dr. Contreras tomorrow but asking what he can apply to his rash in the meantime.     Patient denies any fever, shortness of breath or chest pain.     Additional Information   Did you recently recieve/take any new medicines?     Pantoprazole x 3 days ago    Protocols used: Rash

## 2024-03-05 ENCOUNTER — APPOINTMENT (OUTPATIENT)
Dept: OTHER | Facility: HOSPITAL | Age: 79
End: 2024-03-05
Payer: MEDICARE

## 2024-03-05 ENCOUNTER — HOME CARE VISIT (OUTPATIENT)
Dept: HOME HEALTH SERVICES | Facility: HOME HEALTH | Age: 79
End: 2024-03-05
Payer: MEDICARE

## 2024-03-05 ENCOUNTER — TREATMENT (OUTPATIENT)
Dept: OTHER | Facility: HOSPITAL | Age: 79
End: 2024-03-05
Payer: MEDICARE

## 2024-03-05 ENCOUNTER — OFFICE VISIT (OUTPATIENT)
Dept: HEMATOLOGY/ONCOLOGY | Facility: HOSPITAL | Age: 79
End: 2024-03-05
Payer: MEDICARE

## 2024-03-05 ENCOUNTER — INFUSION (OUTPATIENT)
Dept: HEMATOLOGY/ONCOLOGY | Facility: HOSPITAL | Age: 79
End: 2024-03-05
Payer: MEDICARE

## 2024-03-05 VITALS
OXYGEN SATURATION: 100 % | DIASTOLIC BLOOD PRESSURE: 79 MMHG | HEART RATE: 91 BPM | SYSTOLIC BLOOD PRESSURE: 134 MMHG | TEMPERATURE: 99 F | WEIGHT: 160.05 LBS | BODY MASS INDEX: 26.63 KG/M2 | RESPIRATION RATE: 18 BRPM

## 2024-03-05 DIAGNOSIS — C90.00 IGG MULTIPLE MYELOMA (MULTI): ICD-10-CM

## 2024-03-05 DIAGNOSIS — C90.00 MULTIPLE MYELOMA NOT HAVING ACHIEVED REMISSION (MULTI): ICD-10-CM

## 2024-03-05 DIAGNOSIS — C90.00 MULTIPLE MYELOMA, REMISSION STATUS UNSPECIFIED (MULTI): ICD-10-CM

## 2024-03-05 DIAGNOSIS — C90.00 IGG MULTIPLE MYELOMA (MULTI): Primary | ICD-10-CM

## 2024-03-05 PROBLEM — R94.31 ABNORMAL ELECTROCARDIOGRAPHY: Status: ACTIVE | Noted: 2023-11-07

## 2024-03-05 PROBLEM — R60.0 LOCALIZED EDEMA: Status: ACTIVE | Noted: 2023-11-07

## 2024-03-05 LAB
ALBUMIN SERPL BCP-MCNC: 3.2 G/DL (ref 3.4–5)
ALP SERPL-CCNC: 70 U/L (ref 33–136)
ALT SERPL W P-5'-P-CCNC: 19 U/L (ref 10–52)
ANION GAP SERPL CALC-SCNC: 13 MMOL/L (ref 10–20)
AST SERPL W P-5'-P-CCNC: 26 U/L (ref 9–39)
BASOPHILS # BLD AUTO: 0.06 X10*3/UL (ref 0–0.1)
BASOPHILS NFR BLD AUTO: 2 %
BILIRUB SERPL-MCNC: 0.4 MG/DL (ref 0–1.2)
BUN SERPL-MCNC: 8 MG/DL (ref 6–23)
CALCIUM SERPL-MCNC: 9 MG/DL (ref 8.6–10.3)
CHLORIDE SERPL-SCNC: 106 MMOL/L (ref 98–107)
CO2 SERPL-SCNC: 24 MMOL/L (ref 21–32)
CREAT SERPL-MCNC: 0.73 MG/DL (ref 0.5–1.3)
EGFRCR SERPLBLD CKD-EPI 2021: >90 ML/MIN/1.73M*2
EOSINOPHIL # BLD AUTO: 0.09 X10*3/UL (ref 0–0.4)
EOSINOPHIL NFR BLD AUTO: 2.9 %
ERYTHROCYTE [DISTWIDTH] IN BLOOD BY AUTOMATED COUNT: 17 % (ref 11.5–14.5)
GLUCOSE SERPL-MCNC: 91 MG/DL (ref 74–99)
HCT VFR BLD AUTO: 29.8 % (ref 41–52)
HGB BLD-MCNC: 10 G/DL (ref 13.5–17.5)
IGG SERPL-MCNC: 776 MG/DL (ref 700–1600)
IMM GRANULOCYTES # BLD AUTO: 0.02 X10*3/UL (ref 0–0.5)
IMM GRANULOCYTES NFR BLD AUTO: 0.7 % (ref 0–0.9)
LYMPHOCYTES # BLD AUTO: 0.28 X10*3/UL (ref 0.8–3)
LYMPHOCYTES NFR BLD AUTO: 9.1 %
MAGNESIUM SERPL-MCNC: 1.86 MG/DL (ref 1.6–2.4)
MCH RBC QN AUTO: 33.7 PG (ref 26–34)
MCHC RBC AUTO-ENTMCNC: 33.6 G/DL (ref 32–36)
MCV RBC AUTO: 100 FL (ref 80–100)
MONOCYTES # BLD AUTO: 0.63 X10*3/UL (ref 0.05–0.8)
MONOCYTES NFR BLD AUTO: 20.5 %
NEUTROPHILS # BLD AUTO: 1.99 X10*3/UL (ref 1.6–5.5)
NEUTROPHILS NFR BLD AUTO: 64.8 %
NRBC BLD-RTO: 0 /100 WBCS (ref 0–0)
PLATELET # BLD AUTO: 118 X10*3/UL (ref 150–450)
POTASSIUM SERPL-SCNC: 3.8 MMOL/L (ref 3.5–5.3)
PROT SERPL-MCNC: 5.4 G/DL (ref 6.4–8.2)
RBC # BLD AUTO: 2.97 X10*6/UL (ref 4.5–5.9)
SODIUM SERPL-SCNC: 139 MMOL/L (ref 136–145)
URATE SERPL-MCNC: 3.6 MG/DL (ref 4–7.5)
WBC # BLD AUTO: 3.1 X10*3/UL (ref 4.4–11.3)

## 2024-03-05 PROCEDURE — 96365 THER/PROPH/DIAG IV INF INIT: CPT | Mod: INF

## 2024-03-05 PROCEDURE — 2500000005 HC RX 250 GENERAL PHARMACY W/O HCPCS: Performed by: INTERNAL MEDICINE

## 2024-03-05 PROCEDURE — 82784 ASSAY IGA/IGD/IGG/IGM EACH: CPT

## 2024-03-05 PROCEDURE — 2500000004 HC RX 250 GENERAL PHARMACY W/ HCPCS (ALT 636 FOR OP/ED): Performed by: INTERNAL MEDICINE

## 2024-03-05 PROCEDURE — 84550 ASSAY OF BLOOD/URIC ACID: CPT

## 2024-03-05 PROCEDURE — 80053 COMPREHEN METABOLIC PANEL: CPT

## 2024-03-05 PROCEDURE — 1126F AMNT PAIN NOTED NONE PRSNT: CPT | Performed by: INTERNAL MEDICINE

## 2024-03-05 PROCEDURE — 36415 COLL VENOUS BLD VENIPUNCTURE: CPT

## 2024-03-05 PROCEDURE — 1036F TOBACCO NON-USER: CPT | Performed by: INTERNAL MEDICINE

## 2024-03-05 PROCEDURE — 1111F DSCHRG MED/CURRENT MED MERGE: CPT | Performed by: INTERNAL MEDICINE

## 2024-03-05 PROCEDURE — 85025 COMPLETE CBC W/AUTO DIFF WBC: CPT

## 2024-03-05 PROCEDURE — 1160F RVW MEDS BY RX/DR IN RCRD: CPT | Performed by: INTERNAL MEDICINE

## 2024-03-05 PROCEDURE — 99214 OFFICE O/P EST MOD 30 MIN: CPT | Performed by: INTERNAL MEDICINE

## 2024-03-05 PROCEDURE — 1159F MED LIST DOCD IN RCRD: CPT | Performed by: INTERNAL MEDICINE

## 2024-03-05 PROCEDURE — 83735 ASSAY OF MAGNESIUM: CPT

## 2024-03-05 RX ORDER — ONDANSETRON HYDROCHLORIDE 8 MG/1
8 TABLET, FILM COATED ORAL ONCE
Status: CANCELLED | OUTPATIENT
Start: 2024-04-02

## 2024-03-05 RX ORDER — FAMOTIDINE 10 MG/ML
20 INJECTION INTRAVENOUS ONCE AS NEEDED
Status: CANCELLED | OUTPATIENT
Start: 2024-04-02

## 2024-03-05 RX ORDER — ONDANSETRON HYDROCHLORIDE 8 MG/1
8 TABLET, FILM COATED ORAL ONCE
Status: COMPLETED | OUTPATIENT
Start: 2024-03-05 | End: 2024-03-05

## 2024-03-05 RX ORDER — EPINEPHRINE 0.3 MG/.3ML
0.3 INJECTION SUBCUTANEOUS EVERY 5 MIN PRN
Status: CANCELLED | OUTPATIENT
Start: 2024-04-02

## 2024-03-05 RX ORDER — ALBUTEROL SULFATE 0.83 MG/ML
3 SOLUTION RESPIRATORY (INHALATION) AS NEEDED
Status: DISCONTINUED | OUTPATIENT
Start: 2024-03-05 | End: 2024-03-05 | Stop reason: HOSPADM

## 2024-03-05 RX ORDER — ONDANSETRON HYDROCHLORIDE 8 MG/1
8 TABLET, FILM COATED ORAL ONCE
Status: CANCELLED | OUTPATIENT
Start: 2024-03-05

## 2024-03-05 RX ORDER — DIPHENHYDRAMINE HYDROCHLORIDE 50 MG/ML
50 INJECTION INTRAMUSCULAR; INTRAVENOUS AS NEEDED
Status: DISCONTINUED | OUTPATIENT
Start: 2024-03-05 | End: 2024-03-05 | Stop reason: HOSPADM

## 2024-03-05 RX ORDER — ALBUTEROL SULFATE 0.83 MG/ML
3 SOLUTION RESPIRATORY (INHALATION) AS NEEDED
Status: CANCELLED | OUTPATIENT
Start: 2024-04-02

## 2024-03-05 RX ORDER — DIPHENHYDRAMINE HYDROCHLORIDE 50 MG/ML
50 INJECTION INTRAMUSCULAR; INTRAVENOUS AS NEEDED
Status: CANCELLED | OUTPATIENT
Start: 2024-03-05

## 2024-03-05 RX ORDER — EPINEPHRINE 0.3 MG/.3ML
0.3 INJECTION SUBCUTANEOUS EVERY 5 MIN PRN
Status: DISCONTINUED | OUTPATIENT
Start: 2024-03-05 | End: 2024-03-05 | Stop reason: HOSPADM

## 2024-03-05 RX ORDER — EPINEPHRINE 0.3 MG/.3ML
0.3 INJECTION SUBCUTANEOUS EVERY 5 MIN PRN
Status: CANCELLED | OUTPATIENT
Start: 2024-03-05

## 2024-03-05 RX ORDER — DIPHENHYDRAMINE HYDROCHLORIDE 50 MG/ML
50 INJECTION INTRAMUSCULAR; INTRAVENOUS AS NEEDED
Status: CANCELLED | OUTPATIENT
Start: 2024-04-02

## 2024-03-05 RX ORDER — ALBUTEROL SULFATE 0.83 MG/ML
3 SOLUTION RESPIRATORY (INHALATION) AS NEEDED
Status: CANCELLED | OUTPATIENT
Start: 2024-03-05

## 2024-03-05 RX ORDER — FAMOTIDINE 10 MG/ML
20 INJECTION INTRAVENOUS ONCE AS NEEDED
Status: DISCONTINUED | OUTPATIENT
Start: 2024-03-05 | End: 2024-03-05 | Stop reason: HOSPADM

## 2024-03-05 RX ORDER — FAMOTIDINE 10 MG/ML
20 INJECTION INTRAVENOUS ONCE AS NEEDED
Status: CANCELLED | OUTPATIENT
Start: 2024-03-05

## 2024-03-05 RX ADMIN — ONDANSETRON HYDROCHLORIDE 8 MG: 8 TABLET, FILM COATED ORAL at 13:12

## 2024-03-05 RX ADMIN — PENTAMIDINE ISETHIONATE 288 MG: 300 INJECTION, POWDER, LYOPHILIZED, FOR SOLUTION INTRAMUSCULAR; INTRAVENOUS at 13:22

## 2024-03-05 ASSESSMENT — ENCOUNTER SYMPTOMS
EYES NEGATIVE: 1
RESPIRATORY NEGATIVE: 1
NUMBNESS: 1
ENDOCRINE NEGATIVE: 1
GASTROINTESTINAL NEGATIVE: 1
PSYCHIATRIC NEGATIVE: 1
GASTROINTESTINAL NEGATIVE: 1
NUMBNESS: 1
MYALGIAS: 1
HEMATOLOGIC/LYMPHATIC NEGATIVE: 1
ENDOCRINE NEGATIVE: 1
MYALGIAS: 0
HEMATOLOGIC/LYMPHATIC NEGATIVE: 1
RESPIRATORY NEGATIVE: 1
CONSTITUTIONAL NEGATIVE: 1
CARDIOVASCULAR NEGATIVE: 1
EYES NEGATIVE: 1
PSYCHIATRIC NEGATIVE: 1
CONSTITUTIONAL NEGATIVE: 1
CARDIOVASCULAR NEGATIVE: 1

## 2024-03-05 NOTE — PROGRESS NOTES
Patient ID: David Ferreira is a 78 y.o. male.  Referring Physician: No referring provider defined for this encounter.  Primary Care Provider: No Assigned PCP Generic Provider, MD  Date of Service:  3/5/2024    Chief Complaint: IgG Kappa Multiple Myeloma      Interval History:      Patient is with newly diagnosed Multiple myeloma IgG kappa. He is here for further discussion re: auto SCT as part of consolidation therapy for Multiple Myeloma.     Recent BW showed elevated protein-> further work up c/w Multiple Myeloma. Bone marrow Bx(1/27/23) 30-40% kappa restricted plasma cells FISH without high risk features M protein 3.8 g/dL  B2M 3.8 IgG 6072 free KLC 1944, LLC 3.3  Kappa/Lambda ratio 589   alb 4.0  CT/PET 1/30/23 showed no abnormal lesions.  Patient denies recent weight loss, night sweats, poor appetites. He c/o right shoulder pain 2/2 rotator cuff tear.  He also c/o numbing/tingling both hands 2/2 CTS and bilateral LE pain for which he takes Gabapentin daily.    Treatment:    Velcade, Revlimid, Dexamethasone  Started in 3/2023.     Daratumumab, Kyprolis, Dexamethasone:  Therapy switched in 5/2023 due to slow response to therapy  Achieved [place]     Autologous SCT:  Prep w/ Sonja 140  T=0. 2/7/24    PMHx: Peripheral neuropathy bilateral LE's 2016 on gabapentin   Rotator cuff tear right.  ~6 month ago     PSHx: CTS repair bilateral: right ~ 2 years ago, left 3-4 months ago     Fhx: mother with colon cancer at 74  Father with CAD at 59         Oncology History   Multiple myeloma (CMS/HCC)   11/5/2023 Initial Diagnosis    Multiple Myeloma: IgG kappa. ISS stage II  - Bone marrow 30-40% kappa restricted plasma cells  - Fish negative   - CT/PET: negative  - b2M 3.8   - normal Ca++, Cr, LDH, alb  - VRd started in 3/2023 then switched to D-Kd in late May 23. tolerating chemo well.  - M protein 3.8>3.1>3.0>2.9>2.8>2.1>1.7>1.3  - IgG 6072>>4098>2480>>2211>1700>1480  - Kappa LC 1944 mg/L>>920>685>435  - K/L ratio  589>438>>>457  - Partial response after ~ 8 months Rx  - Bone marrow 11/9/23  5-10% kappa restricted plasma cells.     2/7/2024 -  Bone Marrow Transplant    Collected on 1/29/24, only required 1 day.     Admitted on 2/5/24,  T=0, 2/7/24 for autologous transplant prepped with Melphalan 140 mg/m2.     Hospitalization complicated by:  1. chemotherapy-induced nausea and vomiting, managed with IV and oral anti-emetics, stable and tolerating PO at discharge  2. Diverticulitis, initially presented w/ abdominal pain, and demonstrated on CT AP. He was culture negative but treated w/ 7 days of IV zosyn and sent home on 7 days of flagy and levofloxacin.   3. Deconditioning, managed w/ PT while admitted, ordered home PT on discharge.  4. RUE edema 2/2 fluid overload and immobility of th R extremity due to a rotator cuff injury. Duplex sonography ordered while inpatient but cancelled at patient request due to delay in discharge. Patient and family reporting considerable improvement in RUE edema. Plan for outpatient US pending further evaluation by primary oncologist.          SUBJECTIVE:  History of Present Illness:  David presents to clinic 3/5/24 with his daughter and wife for a follow up visit.  He is feeling better everyday. Developed rash at face 3-4 days ago and has been applying steroid cream-> doing better.  Appetite fair.         Review of Systems   Constitutional: Negative.    HENT: Negative.     Eyes: Negative.    Respiratory: Negative.     Cardiovascular: Negative.    Gastrointestinal: Negative.    Endocrine: Negative.    Genitourinary: Negative.    Musculoskeletal:  Positive for myalgias.   Skin: Negative.    Allergic/Immunologic: Positive for immunocompromised state.   Neurological:  Positive for numbness.   Hematological: Negative.    Psychiatric/Behavioral: Negative.       OBJECTIVE:  KPS: Karnofsky Score: 80 - Normal activity with effort; some signs or symptoms of disease   VS:  There were no vitals taken for  this visit.  BSA: There is no height or weight on file to calculate BSA.    Physical Exam  Constitutional:       Appearance: Normal appearance. He is normal weight.   HENT:      Head: Normocephalic and atraumatic.      Nose: Nose normal.      Mouth/Throat:      Mouth: Mucous membranes are moist.      Pharynx: Oropharynx is clear.   Eyes:      Extraocular Movements: Extraocular movements intact.      Conjunctiva/sclera: Conjunctivae normal.      Pupils: Pupils are equal, round, and reactive to light.   Cardiovascular:      Rate and Rhythm: Normal rate and regular rhythm.      Pulses: Normal pulses.      Heart sounds: Normal heart sounds.   Pulmonary:      Effort: Pulmonary effort is normal.      Breath sounds: Normal breath sounds.   Abdominal:      General: Abdomen is flat. Bowel sounds are normal.      Palpations: Abdomen is soft.   Musculoskeletal:         General: Normal range of motion.      Cervical back: Normal range of motion and neck supple.   Skin:     General: Skin is warm and dry.   Neurological:      General: No focal deficit present.      Mental Status: He is alert and oriented to person, place, and time. Mental status is at baseline.   Psychiatric:         Mood and Affect: Mood normal.         Behavior: Behavior normal.         Thought Content: Thought content normal.         Judgment: Judgment normal.       Laboratory:  The pertinent laboratory results were reviewed and discussed with the patient.    Lab Results   Component Value Date    WBC 3.1 (L) 03/05/2024    HCT 29.8 (L) 03/05/2024    HGB 10.0 (L) 03/05/2024     (L) 03/05/2024    ANC 5.04 02/27/2024    K 3.8 03/05/2024    CALCIUM 9.0 03/05/2024     03/05/2024    MG 1.86 03/05/2024    ALT 19 03/05/2024    AST 26 03/05/2024    BUN 8 03/05/2024    CREATININE 0.73 03/05/2024    PHOS 2.1 (L) 02/22/2024    KAPPA 27.43 (H) 01/25/2024    LAMBDA <0.17 (L) 01/25/2024    KAPLS  01/25/2024      Comment:      One or more analytes used in this  calculation   is outside of the analytical measurement range.  Calculation cannot be performed.    SPEP Aberrant band detected. See immunofixation. 01/25/2024    IEPIN  01/25/2024 01/25/24 Known monoclonal IgG kappa in the gamma region at 0.9 g/dL. Last detected on 11/30/23 at 1.0 g/dL.     02/27/2024    IGM <5 (L) 01/25/2024    IGA <7 (L) 01/25/2024      Note: for a comprehensive list of the patient's lab results, access the Results Review activity.    ASSESSMENT and PLAN:    Plan:    Multiple Myeloma: IgG kappa. ISS stage II  - Bone marrow 30-40% kappa restricted plasma cells  - Fish negative   - CT/PET: negative  - b2M 3.8   - normal Ca++, Cr, LDH, alb  - VRd started in 3/2023 then switched to D-Kd in late May 23. tolerating chemo well.  - M protein 3.8>3.1>3.0>2.9>2.8>2.1>1.7>1.3  - IgG 6072>>4098>2480>>2211>1700>1480  - Kappa LC 1944 mg/L>>920>685>435  - K/L ratio 589>438>>>457  - Partial response after ~ 8 months Rx  - Bone marrow 11/9/23  5-10% kappa restricted plasma cells.  - s/p autoSCT T=0, 2/7/24  - doing well.   - labs reviewed with patient and his son     Peripheral neuropathy  - bilateral LE's : on Gabapentin   - bilateral CTS-> s/p repair. still symptomatic    R Rotator Cuff Tear:  - Continued pain   - Needs surgery, will consider s/p auto  Rash- likely from Bactrim-> change to Pentamidine    RTC: 2 weeks. One week for mal heme  S/p auto, admit 2/5  Zheng Contreras MD

## 2024-03-05 NOTE — PROGRESS NOTES
Patient ID: David Ferreira is a 78 y.o. male.  Referring Physician: No referring provider defined for this encounter.  Primary Care Provider: No Assigned PCP Generic Provider, MD  Date of Service:  3/5/2024    Chief Complaint: IgG Kappa Multiple Myeloma      Interval History:      Patient is with newly diagnosed Multiple myeloma IgG kappa. He is here for further discussion re: auto SCT as part of consolidation therapy for Multiple Myeloma.     Recent BW showed elevated protein-> further work up c/w Multiple Myeloma. Bone marrow Bx(1/27/23) 30-40% kappa restricted plasma cells FISH without high risk features M protein 3.8 g/dL  B2M 3.8 IgG 6072 free KLC 1944, LLC 3.3  Kappa/Lambda ratio 589   alb 4.0  CT/PET 1/30/23 showed no abnormal lesions.  Patient denies recent weight loss, night sweats, poor appetites. He c/o right shoulder pain 2/2 rotator cuff tear.  He also c/o numbing/tingling both hands 2/2 CTS and bilateral LE pain for which he takes Gabapentin daily.    Treatment:    Velcade, Revlimid, Dexamethasone  Started in 3/2023.     Daratumumab, Kyprolis, Dexamethasone:  Therapy switched in 5/2023 due to slow response to therapy  Achieved [place]     Autologous SCT:  Prep w/ Sonja 140  T=0. 2/7/24    PMHx: Peripheral neuropathy bilateral LE's 2016 on gabapentin   Rotator cuff tear right.  ~6 month ago     PSHx: CTS repair bilateral: right ~ 2 years ago, left 3-4 months ago     Fhx: mother with colon cancer at 74  Father with CAD at 59         Oncology History   Multiple myeloma (CMS/HCC)   11/5/2023 Initial Diagnosis    Multiple Myeloma: IgG kappa. ISS stage II  - Bone marrow 30-40% kappa restricted plasma cells  - Fish negative   - CT/PET: negative  - b2M 3.8   - normal Ca++, Cr, LDH, alb  - VRd started in 3/2023 then switched to D-Kd in late May 23. tolerating chemo well.  - M protein 3.8>3.1>3.0>2.9>2.8>2.1>1.7>1.3  - IgG 6072>>4098>2480>>2211>1700>1480  - Kappa LC 1944 mg/L>>920>685>435  - K/L ratio  589>438>>>457  - Partial response after ~ 8 months Rx  - Bone marrow 11/9/23  5-10% kappa restricted plasma cells.     2/7/2024 -  Bone Marrow Transplant    Collected on 1/29/24, only required 1 day.     Admitted on 2/5/24,  T=0, 2/7/24 for autologous transplant prepped with Melphalan 140 mg/m2.     Hospitalization complicated by:  1. chemotherapy-induced nausea and vomiting, managed with IV and oral anti-emetics, stable and tolerating PO at discharge  2. Diverticulitis, initially presented w/ abdominal pain, and demonstrated on CT AP. He was culture negative but treated w/ 7 days of IV zosyn and sent home on 7 days of flagy and levofloxacin.   3. Deconditioning, managed w/ PT while admitted, ordered home PT on discharge.  4. RUE edema 2/2 fluid overload and immobility of th R extremity due to a rotator cuff injury. Duplex sonography ordered while inpatient but cancelled at patient request due to delay in discharge. Patient and family reporting considerable improvement in RUE edema. Plan for outpatient US pending further evaluation by primary oncologist.          SUBJECTIVE:  History of Present Illness:  David presents to clinic 3/5/24 with his son for a follow up visit.  Overall he is doing well. Feels better everyday. Appetites fair        Review of Systems   Constitutional: Negative.    HENT: Negative.     Eyes: Negative.    Respiratory: Negative.     Cardiovascular: Negative.    Gastrointestinal: Negative.    Endocrine: Negative.    Genitourinary: Negative.    Musculoskeletal:  Negative for myalgias.   Skin: Negative.    Allergic/Immunologic: Positive for immunocompromised state.   Neurological:  Positive for numbness.   Hematological: Negative.    Psychiatric/Behavioral: Negative.       OBJECTIVE:  KPS: Karnofsky Score: 80 - Normal activity with effort; some signs or symptoms of disease   VS:  There were no vitals taken for this visit.  BSA: There is no height or weight on file to calculate BSA.    Physical  Exam  Constitutional:       Appearance: Normal appearance. He is normal weight.   HENT:      Head: Normocephalic and atraumatic.      Nose: Nose normal.      Mouth/Throat:      Mouth: Mucous membranes are moist.      Pharynx: Oropharynx is clear.   Eyes:      Extraocular Movements: Extraocular movements intact.      Conjunctiva/sclera: Conjunctivae normal.      Pupils: Pupils are equal, round, and reactive to light.   Cardiovascular:      Rate and Rhythm: Normal rate and regular rhythm.      Pulses: Normal pulses.      Heart sounds: Normal heart sounds.   Pulmonary:      Effort: Pulmonary effort is normal.      Breath sounds: Normal breath sounds.   Abdominal:      General: Abdomen is flat. Bowel sounds are normal.      Palpations: Abdomen is soft.   Musculoskeletal:         General: Normal range of motion.      Cervical back: Normal range of motion and neck supple.      Right lower leg: Edema present.      Left lower leg: Edema present.      Comments: 2+ bilateral   Skin:     General: Skin is warm and dry.   Neurological:      General: No focal deficit present.      Mental Status: He is alert and oriented to person, place, and time. Mental status is at baseline.   Psychiatric:         Mood and Affect: Mood normal.         Behavior: Behavior normal.         Thought Content: Thought content normal.         Judgment: Judgment normal.       Laboratory:  The pertinent laboratory results were reviewed and discussed with the patient.    Lab Results   Component Value Date    WBC 3.1 (L) 03/05/2024    HCT 29.8 (L) 03/05/2024    HGB 10.0 (L) 03/05/2024     (L) 03/05/2024    ANC 5.04 02/27/2024    K 3.8 03/05/2024    CALCIUM 9.0 03/05/2024     03/05/2024    MG 1.86 03/05/2024    ALT 19 03/05/2024    AST 26 03/05/2024    BUN 8 03/05/2024    CREATININE 0.73 03/05/2024    PHOS 2.1 (L) 02/22/2024    KAPPA 27.43 (H) 01/25/2024    LAMBDA <0.17 (L) 01/25/2024    KAPLS  01/25/2024      Comment:      One or more analytes  used in this calculation   is outside of the analytical measurement range.  Calculation cannot be performed.    SPEP Aberrant band detected. See immunofixation. 01/25/2024    IEPIN  01/25/2024 01/25/24 Known monoclonal IgG kappa in the gamma region at 0.9 g/dL. Last detected on 11/30/23 at 1.0 g/dL.     02/27/2024    IGM <5 (L) 01/25/2024    IGA <7 (L) 01/25/2024      Note: for a comprehensive list of the patient's lab results, access the Results Review activity.    ASSESSMENT and PLAN:    Plan:    Multiple Myeloma: IgG kappa. ISS stage II  - Bone marrow 30-40% kappa restricted plasma cells(1/27/23)  - VRd started in 3/2023 then switched to D-Kd in late May 23. tolerating chemo well.  - Partial response after ~ 8 months Rx  - Bone marrow 11/9/23  5-10% kappa restricted plasma cells.  - s/p autoSCT  on 2/5/24,  T=0, 2/7/24  - doing well overall  -rash ? Bactrim?-> Pentamidine given  - discontinue bactrim    Rash  - new, on face and chest/abd for past 3-4 days-> c/w drug rash  - Protonix and bactrim were started one week ago.  - will discontinue Bactrim for now as above.    Peripheral neuropathy  - bilateral LE's : on Gabapentin and Cymbalta  - bilateral CTS-> s/p repair. still symptomatic    R Rotator Cuff Tear:  - Continued pain   - Needs surgery, will consider s/p auto    RTC: 1 week    Ok-[place]ia Contreras MD         162.56

## 2024-03-05 NOTE — PROGRESS NOTES
Pt ambulated to SCT unit without assistance, accompanied by son. Pt denies complaints or pain today. Vitals obtained, blood drawn via venipuncture and sent to lab. Patient to see Dr. Contreras today. A copy of lab results and schedule provided to pt. Pt ambulated off unit without complaints or assistance.

## 2024-03-05 NOTE — HOME HEALTH
Subjective:    David presents to his first home health PT visit following recent Stem Cell Transplant.  Overall, he states he is doing very well.  He is up ad anastasia without a device and has no pain.  He resides with his wife in a first floor apartment.      Objective:    See [facility] [facility] for details.    Assessment:    David is doing very well.  He is up ad anastasia without a device and had a perfect score on his Tinetti.  PT issued patient a written HEP and we reviewed it together.  He demonstrated a good understanding of them.  PT encouraged patient to hold onto his kitchen counter when completing standing exercises.  PT also educated patient on progressive walking program to gradually build his endurance.  He was educated on balancing periods of activity and rest.  At this point, patient does not require regular PT sessions.  PT will return next week to follow up on his HEP and progressive walking program status - at that time, will likely DC.  Patient agreed with this plan.    Plan:    Likely DC next visit.

## 2024-03-07 ENCOUNTER — NURSE TRIAGE (OUTPATIENT)
Dept: ADMISSION | Facility: HOSPITAL | Age: 79
End: 2024-03-07
Payer: MEDICARE

## 2024-03-07 NOTE — TELEPHONE ENCOUNTER
I called the pt again- he received my VM. He just took the Tylenol so will let us know if that doesn't help.

## 2024-03-07 NOTE — TELEPHONE ENCOUNTER
Per CNP, he can take Tylenol to see if that helps. I called the pt back, no answer. I left him a VM to try the Tylenol but if that doesn't help to let us know.

## 2024-03-07 NOTE — TELEPHONE ENCOUNTER
Additional Information   Commented on: Where is the pain? Is there more than one place where you're having pain?     Left sciatic area radiating down left leg    Protocols used: Pain    Pt called reporting 10/10 pain in his left sciatic area radiating down his leg all the way down to his ankle from his left hip. Denies chest pain, fever, SOB. No leg weakness. He said he was exercising this AM and this pain developed after he started walking- he has had this happen before and Tylenol has helped. He would like to know if he can take Tylenol and wants Dr. Contreras to be aware. Secure chat sent to the team.

## 2024-03-08 ENCOUNTER — APPOINTMENT (OUTPATIENT)
Dept: OTHER | Facility: HOSPITAL | Age: 79
End: 2024-03-08
Payer: MEDICARE

## 2024-03-08 NOTE — TELEPHONE ENCOUNTER
I called David and left him a detailed VM (per his request) that a Medrol dose pack has been sent in for him. I advised him to call back with any further questions/concerns.

## 2024-03-08 NOTE — TELEPHONE ENCOUNTER
Patient called back in with an update; he did take Tylenol 650mg for this sciatic pain. He states his pain went from a 10 to a 3 after taking the Tylenol though this only lasted about an hour and the pain went right back up to an 8 or 9. He is having a difficult time walking/exercising due to the pain. Pain does subside with rest.   Asking if the team can send something in for him/provide him any further recommendations.

## 2024-03-11 ENCOUNTER — TREATMENT (OUTPATIENT)
Dept: OTHER | Facility: HOSPITAL | Age: 79
End: 2024-03-11
Payer: MEDICARE

## 2024-03-11 ENCOUNTER — OFFICE VISIT (OUTPATIENT)
Dept: OTHER | Facility: HOSPITAL | Age: 79
End: 2024-03-11
Payer: MEDICARE

## 2024-03-11 VITALS
BODY MASS INDEX: 24.98 KG/M2 | RESPIRATION RATE: 18 BRPM | SYSTOLIC BLOOD PRESSURE: 139 MMHG | TEMPERATURE: 97.9 F | OXYGEN SATURATION: 99 % | HEART RATE: 105 BPM | WEIGHT: 150.13 LBS | DIASTOLIC BLOOD PRESSURE: 74 MMHG

## 2024-03-11 DIAGNOSIS — R11.2 CHEMOTHERAPY INDUCED NAUSEA AND VOMITING: ICD-10-CM

## 2024-03-11 DIAGNOSIS — E87.6 HYPOKALEMIA: Primary | ICD-10-CM

## 2024-03-11 DIAGNOSIS — C90.00 IGG MULTIPLE MYELOMA (MULTI): ICD-10-CM

## 2024-03-11 DIAGNOSIS — Z94.84 STEM CELLS TRANSPLANT STATUS (MULTI): ICD-10-CM

## 2024-03-11 DIAGNOSIS — G62.9 PERIPHERAL POLYNEUROPATHY: ICD-10-CM

## 2024-03-11 DIAGNOSIS — T45.1X5A CHEMOTHERAPY INDUCED NAUSEA AND VOMITING: ICD-10-CM

## 2024-03-11 DIAGNOSIS — D84.9 IMMUNOCOMPROMISED STATE (MULTI): ICD-10-CM

## 2024-03-11 DIAGNOSIS — M75.111 INCOMPLETE TEAR OF RIGHT ROTATOR CUFF, UNSPECIFIED WHETHER TRAUMATIC: ICD-10-CM

## 2024-03-11 DIAGNOSIS — C90.00 MULTIPLE MYELOMA, REMISSION STATUS UNSPECIFIED (MULTI): ICD-10-CM

## 2024-03-11 LAB
ALBUMIN SERPL BCP-MCNC: 3.9 G/DL (ref 3.4–5)
ALP SERPL-CCNC: 77 U/L (ref 33–136)
ALT SERPL W P-5'-P-CCNC: 17 U/L (ref 10–52)
ANION GAP SERPL CALC-SCNC: 14 MMOL/L (ref 10–20)
AST SERPL W P-5'-P-CCNC: 16 U/L (ref 9–39)
BASOPHILS # BLD AUTO: 0.09 X10*3/UL (ref 0–0.1)
BASOPHILS NFR BLD AUTO: 1.2 %
BILIRUB SERPL-MCNC: 0.5 MG/DL (ref 0–1.2)
BUN SERPL-MCNC: 17 MG/DL (ref 6–23)
CALCIUM SERPL-MCNC: 10.2 MG/DL (ref 8.6–10.3)
CHLORIDE SERPL-SCNC: 105 MMOL/L (ref 98–107)
CO2 SERPL-SCNC: 25 MMOL/L (ref 21–32)
CREAT SERPL-MCNC: 0.95 MG/DL (ref 0.5–1.3)
EGFRCR SERPLBLD CKD-EPI 2021: 82 ML/MIN/1.73M*2
EOSINOPHIL # BLD AUTO: 0.28 X10*3/UL (ref 0–0.4)
EOSINOPHIL NFR BLD AUTO: 3.6 %
ERYTHROCYTE [DISTWIDTH] IN BLOOD BY AUTOMATED COUNT: 16.6 % (ref 11.5–14.5)
GLUCOSE SERPL-MCNC: 111 MG/DL (ref 74–99)
HCT VFR BLD AUTO: 35.8 % (ref 41–52)
HGB BLD-MCNC: 11.7 G/DL (ref 13.5–17.5)
IMM GRANULOCYTES # BLD AUTO: 0.05 X10*3/UL (ref 0–0.5)
IMM GRANULOCYTES NFR BLD AUTO: 0.6 % (ref 0–0.9)
LYMPHOCYTES # BLD AUTO: 1.22 X10*3/UL (ref 0.8–3)
LYMPHOCYTES NFR BLD AUTO: 15.7 %
MAGNESIUM SERPL-MCNC: 2 MG/DL (ref 1.6–2.4)
MCH RBC QN AUTO: 33.1 PG (ref 26–34)
MCHC RBC AUTO-ENTMCNC: 32.7 G/DL (ref 32–36)
MCV RBC AUTO: 101 FL (ref 80–100)
MONOCYTES # BLD AUTO: 0.97 X10*3/UL (ref 0.05–0.8)
MONOCYTES NFR BLD AUTO: 12.5 %
NEUTROPHILS # BLD AUTO: 5.15 X10*3/UL (ref 1.6–5.5)
NEUTROPHILS NFR BLD AUTO: 66.4 %
NRBC BLD-RTO: 0 /100 WBCS (ref 0–0)
PLATELET # BLD AUTO: 151 X10*3/UL (ref 150–450)
POTASSIUM SERPL-SCNC: 4.4 MMOL/L (ref 3.5–5.3)
PROT SERPL-MCNC: 6.4 G/DL (ref 6.4–8.2)
RBC # BLD AUTO: 3.53 X10*6/UL (ref 4.5–5.9)
SODIUM SERPL-SCNC: 140 MMOL/L (ref 136–145)
URATE SERPL-MCNC: 4.9 MG/DL (ref 4–7.5)
WBC # BLD AUTO: 7.8 X10*3/UL (ref 4.4–11.3)

## 2024-03-11 PROCEDURE — 36415 COLL VENOUS BLD VENIPUNCTURE: CPT

## 2024-03-11 PROCEDURE — 85025 COMPLETE CBC W/AUTO DIFF WBC: CPT

## 2024-03-11 PROCEDURE — 1160F RVW MEDS BY RX/DR IN RCRD: CPT | Performed by: STUDENT IN AN ORGANIZED HEALTH CARE EDUCATION/TRAINING PROGRAM

## 2024-03-11 PROCEDURE — 83735 ASSAY OF MAGNESIUM: CPT

## 2024-03-11 PROCEDURE — 1111F DSCHRG MED/CURRENT MED MERGE: CPT | Performed by: STUDENT IN AN ORGANIZED HEALTH CARE EDUCATION/TRAINING PROGRAM

## 2024-03-11 PROCEDURE — 99215 OFFICE O/P EST HI 40 MIN: CPT | Performed by: STUDENT IN AN ORGANIZED HEALTH CARE EDUCATION/TRAINING PROGRAM

## 2024-03-11 PROCEDURE — 84550 ASSAY OF BLOOD/URIC ACID: CPT

## 2024-03-11 PROCEDURE — 1036F TOBACCO NON-USER: CPT | Performed by: STUDENT IN AN ORGANIZED HEALTH CARE EDUCATION/TRAINING PROGRAM

## 2024-03-11 PROCEDURE — 1159F MED LIST DOCD IN RCRD: CPT | Performed by: STUDENT IN AN ORGANIZED HEALTH CARE EDUCATION/TRAINING PROGRAM

## 2024-03-11 PROCEDURE — 99195 PHLEBOTOMY: CPT

## 2024-03-11 PROCEDURE — 80053 COMPREHEN METABOLIC PANEL: CPT

## 2024-03-11 RX ORDER — POTASSIUM CHLORIDE 750 MG/1
20 TABLET, FILM COATED, EXTENDED RELEASE ORAL DAILY
Qty: 60 TABLET | Refills: 0
Start: 2024-03-11 | End: 2024-03-21 | Stop reason: ALTCHOICE

## 2024-03-11 ASSESSMENT — PAIN - FUNCTIONAL ASSESSMENT: PAIN_FUNCTIONAL_ASSESSMENT: 0-10

## 2024-03-11 ASSESSMENT — PAIN SCALES - GENERAL: PAINLEVEL_OUTOF10: 0 - NO PAIN

## 2024-03-11 NOTE — PROGRESS NOTES
Patient ID: David Ferreira is a 78 y.o. male.    Subjective    HPI  David Ferreira presents today for post transplant follow up, accompanied by wife and daughter. He is feeling remarkably well. Since his visit last week, he reports his rash has resolved. He is on day 3 of medrol dosepak. Over past several days, his appetite has also improved significantly though his weight is down. Notes resolution of bilateral lower leg swelling, now wearing compression socks. No recent fevers or other signs of infection.      Objective    BSA: 1.77 meters squared  /74 (BP Location: Right arm, Patient Position: Sitting)   Pulse 105   Temp 36.6 °C (97.9 °F) (Skin)   Resp 18   Wt 68.1 kg (150 lb 2.1 oz)   SpO2 99%   BMI 24.98 kg/m²     HCT Type: Autologous  Transplant Date: 2/7/2024   Days since transplant: 33      Physical Exam  Constitutional:       Appearance: Normal appearance.   Eyes:      Conjunctiva/sclera: Conjunctivae normal.      Pupils: Pupils are equal, round, and reactive to light.   Cardiovascular:      Rate and Rhythm: Normal rate and regular rhythm.   Pulmonary:      Effort: Pulmonary effort is normal.      Breath sounds: Normal breath sounds.   Musculoskeletal:         General: No swelling. Normal range of motion.   Skin:     Findings: No rash.   Neurological:      Mental Status: He is alert. Mental status is at baseline.   Psychiatric:         Mood and Affect: Mood normal.         Performance Status:  Karnofsky Score: 80 - Normal activity with effort; some signs or symptoms of disease      Assessment/Plan   IgG multiple myeloma (CMS/HCC)  - Bone marrow 30-40% kappa restricted plasma cells(1/27/23)  - VRd started in 3/2023 then switched to D-Kd in late May 23. tolerating chemo well.  - Partial response after ~ 8 months Rx  - Bone marrow 11/9/23  5-10% kappa restricted plasma cells.  - s/p autoSCT  on 2/5/24,  T=0, 2/7/24  - doing well overall    Stem cells transplant status (CMS/[facility])  S/p autologous  stem cell transplant. Counts recovering well with improvement in physical strength and appetite. T+33 today.     Immunocompromised state (CMS/HCC)  # Prophylaxis  - ACV, Fluconazole  - Initiated on bactrim however developed a rash so bactrim stopped and received dose of pentamidine on 3/5/24.     Chemotherapy induced nausea and vomiting  Improved.     Peripheral neuropathy  # Peripheral neuropathy B/l LE and 2/2 B/L carpal tunnel syndrome s/p repair  - Continue gabapentin and Cymbalta 30mg    Right rotator cuff tear  R Rotator Cuff Tear:  - Continued pain   - Needs surgery, will consider s/p auto    Hypokalemia  Hypokalemia improved. Will decrease po potassium to 20 mEq daily.       Oncology History   Multiple myeloma (CMS/HCC)   11/5/2023 Initial Diagnosis    Multiple Myeloma: IgG kappa. ISS stage II  - Bone marrow 30-40% kappa restricted plasma cells  - Fish negative   - CT/PET: negative  - b2M 3.8   - normal Ca++, Cr, LDH, alb  - VRd started in 3/2023 then switched to D-Kd in late May 23. tolerating chemo well.  - M protein 3.8>3.1>3.0>2.9>2.8>2.1>1.7>1.3  - IgG 6072>>4098>2480>>2211>1700>1480  - Kappa LC 1944 mg/L>>920>685>435  - K/L ratio 589>438>>>457  - Partial response after ~ 8 months Rx  - Bone marrow 11/9/23  5-10% kappa restricted plasma cells.     2/7/2024 -  Bone Marrow Transplant    Collected on 1/29/24, only required 1 day.     Admitted on 2/5/24,  T=0, 2/7/24 for autologous transplant prepped with Melphalan 140 mg/m2.     Hospitalization complicated by:  1. chemotherapy-induced nausea and vomiting, managed with IV and oral anti-emetics, stable and tolerating PO at discharge  2. Diverticulitis, initially presented w/ abdominal pain, and demonstrated on CT AP. He was culture negative but treated w/ 7 days of IV zosyn and sent home on 7 days of flagy and levofloxacin.   3. Deconditioning, managed w/ PT while admitted, ordered home PT on discharge.  4. RUE edema 2/2 fluid overload and immobility of th R  extremity due to a rotator cuff injury. Duplex sonography ordered while inpatient but cancelled at patient request due to delay in discharge. Patient and family reporting considerable improvement in RUE edema. Plan for outpatient US pending further evaluation by primary oncologist.            Cristian Morillo PA-C

## 2024-03-11 NOTE — PROGRESS NOTES
ONCOLOGY PHARMACY MEDICATION EDUCATION NOTE   David Ferreira is a 78 y.o. male currently day + 33 following autologous stem cell transplant for a diagnosis of multiple myeloma. Pharmacist was consulted to provide home medication education.     Objective  /74 (BP Location: Right arm, Patient Position: Sitting)   Pulse 105   Temp 36.6 °C (97.9 °F) (Skin)   Resp 18   Wt 68.1 kg (150 lb 2.1 oz)   SpO2 99%   BMI 24.98 kg/m²   Lab Results   Component Value Date    WBC 7.8 03/11/2024    HGB 11.7 (L) 03/11/2024    HCT 35.8 (L) 03/11/2024     (H) 03/11/2024     03/11/2024      Lab Results   Component Value Date    GLUCOSE 111 (H) 03/11/2024    CALCIUM 10.2 03/11/2024     03/11/2024    K 4.4 03/11/2024    CO2 25 03/11/2024     03/11/2024    BUN 17 03/11/2024    CREATININE 0.95 03/11/2024     Lab Results   Component Value Date    ALT 17 03/11/2024    AST 16 03/11/2024    ALKPHOS 77 03/11/2024    BILITOT 0.5 03/11/2024     Allergies and Medications   No Known Allergies    Current Outpatient Medications:     acyclovir (Zovirax) 400 mg tablet, Take 1 tablet (400 mg) by mouth every 12 hours., Disp: 60 tablet, Rfl: 2    capsaicin (Zostrix) 0.025 % cream, Apply topically as needed at bedtime for mild pain (1 - 3)., Disp: 56.6 g, Rfl: 2    DULoxetine (Cymbalta) 30 mg DR capsule, Take 1 capsule (30 mg) by mouth once daily at bedtime. Do not crush or chew., Disp: 30 capsule, Rfl: 11    gabapentin (Neurontin) 300 mg capsule, Take 1 capsule (300 mg) by mouth once daily with breakfast AND 1 capsule (300 mg) once daily at noon. Take with meals AND 2 capsules (600 mg) once daily at bedtime., Disp: 120 capsule, Rfl: 2    loperamide (Imodium) 2 mg capsule, Take 1 capsule (2 mg) by mouth 4 times a day as needed for diarrhea for up to 10 days., Disp: 30 capsule, Rfl: 0    melatonin 10 mg tablet, Take 1 tablet (10 mg) by mouth once daily at bedtime., Disp: 30 tablet, Rfl: 2    methylPREDNISolone (Medrol  Dospak) 4 mg tablets, Follow schedule on package instructions, Disp: 21 tablet, Rfl: 0    ondansetron (Zofran) 8 mg tablet, Take 1 tablet (8 mg) by mouth every 8 hours if needed for nausea for up to 7 days., Disp: 20 tablet, Rfl: 0    pantoprazole (ProtoNix) 40 mg EC tablet, Take 1 tablet (40 mg) by mouth once daily in the morning. Take before meals. Do not crush, chew, or split. Do not start before February 22, 2024., Disp: 30 tablet, Rfl: 11    potassium chloride CR (Klor-Con) 10 mEq ER tablet, Take 2 tablets (20 mEq) by mouth once daily. Do not crush, chew, or split., Disp: 60 tablet, Rfl: 0    prochlorperazine (Compazine) 10 mg tablet, Take 1 tablet (10 mg) by mouth every 6 hours if needed for nausea or vomiting for up to 7 days., Disp: 30 tablet, Rfl: 0    Medication Education: Education was provided regarding all home medication changes. In addition, patient was given written daily medication schedules. The schedule was discussed in detail with the patient, including drug name, use and dose, and the appropriate timing of self-administration.   - Medication changes: decreased potassium    The patient's daughter demonstrated Level of Understanding : Good for their current medication list. Daughter fills pill box at home.       Maritza Willoughby, PharmD, [facility]  Ambulatory Stem Cell Transplant Transplant Pharmacist

## 2024-03-11 NOTE — PROGRESS NOTES
Pt ambulated to SCT unit without assistance. Pt denies complaints or pain today. Vitals obtained, blood drawn peripherally and sent to lab. Cristian Morillo NP came to see patient. Pt ambulated off unit without complaints or assistance.

## 2024-03-14 NOTE — ASSESSMENT & PLAN NOTE
S/p autologous stem cell transplant. Counts recovering well with improvement in physical strength and appetite. T+33 today.

## 2024-03-14 NOTE — ASSESSMENT & PLAN NOTE
# Prophylaxis  - ACV, Fluconazole  - Initiated on bactrim however developed a rash so bactrim stopped and received dose of pentamidine on 3/5/24.

## 2024-03-14 NOTE — ASSESSMENT & PLAN NOTE
- Bone marrow 30-40% kappa restricted plasma cells(1/27/23)  - VRd started in 3/2023 then switched to D-Kd in late May 23. tolerating chemo well.  - Partial response after ~ 8 months Rx  - Bone marrow 11/9/23  5-10% kappa restricted plasma cells.  - s/p autoSCT  on 2/5/24,  T=0, 2/7/24  - doing well overall

## 2024-03-15 ENCOUNTER — HOME CARE VISIT (OUTPATIENT)
Dept: HOME HEALTH SERVICES | Facility: HOME HEALTH | Age: 79
End: 2024-03-15
Payer: MEDICARE

## 2024-03-15 VITALS — HEART RATE: 99 BPM | SYSTOLIC BLOOD PRESSURE: 132 MMHG | DIASTOLIC BLOOD PRESSURE: 77 MMHG | OXYGEN SATURATION: 98 %

## 2024-03-15 PROCEDURE — G0151 HHCP-SERV OF PT,EA 15 MIN: HCPCS

## 2024-03-15 ASSESSMENT — ENCOUNTER SYMPTOMS: DENIES PAIN: 1

## 2024-03-15 ASSESSMENT — ACTIVITIES OF DAILY LIVING (ADL)
HOME_HEALTH_OASIS: 00
OASIS_M1830: 00

## 2024-03-15 NOTE — HOME HEALTH
Subjective:    David states that he is feeling great.  He has been walking regularly and remains compliant with his HEP and he says it is working.  His energy level has increased.  He denies any pain.    Objective:    See [facility]AS[facility] DC for details.    Assessment:    David is doing very well.  He is independent with all mobility and compliant with his HEP.  He has no concerns and agrees with plan to [place] PT services at this time.  He was encouraged to call with any questions or concerns.  NOMNC signed.    Plan:    [place] home health PT services at this time.

## 2024-03-21 ENCOUNTER — OFFICE VISIT (OUTPATIENT)
Dept: HEMATOLOGY/ONCOLOGY | Facility: HOSPITAL | Age: 79
End: 2024-03-21
Payer: MEDICARE

## 2024-03-21 ENCOUNTER — TELEPHONE (OUTPATIENT)
Dept: HEMATOLOGY/ONCOLOGY | Facility: HOSPITAL | Age: 79
End: 2024-03-21

## 2024-03-21 VITALS
OXYGEN SATURATION: 100 % | SYSTOLIC BLOOD PRESSURE: 130 MMHG | RESPIRATION RATE: 16 BRPM | WEIGHT: 153 LBS | BODY MASS INDEX: 25.46 KG/M2 | HEART RATE: 98 BPM | DIASTOLIC BLOOD PRESSURE: 86 MMHG | TEMPERATURE: 97.2 F

## 2024-03-21 DIAGNOSIS — C90.00 IGG MULTIPLE MYELOMA (MULTI): Primary | ICD-10-CM

## 2024-03-21 DIAGNOSIS — G62.9 PERIPHERAL POLYNEUROPATHY: ICD-10-CM

## 2024-03-21 DIAGNOSIS — D84.9 IMMUNOCOMPROMISED STATE (MULTI): ICD-10-CM

## 2024-03-21 DIAGNOSIS — Z94.84 STEM CELLS TRANSPLANT STATUS (MULTI): ICD-10-CM

## 2024-03-21 PROCEDURE — 1159F MED LIST DOCD IN RCRD: CPT

## 2024-03-21 PROCEDURE — 1160F RVW MEDS BY RX/DR IN RCRD: CPT

## 2024-03-21 PROCEDURE — 1036F TOBACCO NON-USER: CPT

## 2024-03-21 PROCEDURE — 99215 OFFICE O/P EST HI 40 MIN: CPT

## 2024-03-21 PROCEDURE — 1111F DSCHRG MED/CURRENT MED MERGE: CPT

## 2024-03-21 PROCEDURE — 1126F AMNT PAIN NOTED NONE PRSNT: CPT

## 2024-03-21 ASSESSMENT — PAIN SCALES - GENERAL: PAINLEVEL: 0-NO PAIN

## 2024-03-21 ASSESSMENT — ENCOUNTER SYMPTOMS
GASTROINTESTINAL NEGATIVE: 1
HEMATOLOGIC/LYMPHATIC NEGATIVE: 1
CONSTITUTIONAL NEGATIVE: 1
EYES NEGATIVE: 1
ENDOCRINE NEGATIVE: 1
NUMBNESS: 1
RESPIRATORY NEGATIVE: 1
CARDIOVASCULAR NEGATIVE: 1
MYALGIAS: 0
PSYCHIATRIC NEGATIVE: 1

## 2024-03-21 NOTE — PROGRESS NOTES
Patient ID: David Ferreira is a 78 y.o. male.  Referring Physician: No referring provider defined for this encounter.  Primary Care Provider: No Assigned PCP Generic Provider, MD  Date of Service:  3/21/2024    Chief Complaint: IgG Kappa Multiple Myeloma      Interval History:      Patient is with newly diagnosed Multiple myeloma IgG kappa. He is here for further discussion re: auto SCT as part of consolidation therapy for Multiple Myeloma.     Recent BW showed elevated protein-> further work up c/w Multiple Myeloma. Bone marrow Bx(1/27/23) 30-40% kappa restricted plasma cells FISH without high risk features M protein 3.8 g/dL  B2M 3.8 IgG 6072 free KLC 1944, [employer] 3.3  Kappa/Lambda ratio 589   alb 4.0  CT/PET 1/30/23 showed no abnormal lesions.  Patient denies recent weight loss, night sweats, poor appetites. He c/o right shoulder pain 2/2 rotator cuff tear.  He also c/o numbing/tingling both hands 2/2 CTS and bilateral LE pain for which he takes Gabapentin daily.    Treatment:    Velcade, Revlimid, Dexamethasone  Started in 3/2023.     Daratumumab, Kyprolis, Dexamethasone:  Therapy switched in 5/2023 due to slow response to therapy  Achieved RI     Autologous SCT:  Prep w/ Sonja 140  T=0. 2/7/24    PMHx: Peripheral neuropathy bilateral LE's 2016 on gabapentin   Rotator cuff tear right.  ~6 month ago     PSHx: CTS repair bilateral: right ~ 2 years ago, left 3-4 months ago     Fhx: mother with colon cancer at 74  Father with CAD at 59       Oncology History   Multiple myeloma (CMS/HCC)   11/5/2023 Initial Diagnosis    Multiple Myeloma: IgG kappa. ISS stage II  - Bone marrow 30-40% kappa restricted plasma cells  - Fish negative   - CT/PET: negative  - b2M 3.8   - normal Ca++, Cr, LDH, alb  - VRd started in 3/2023 then switched to D-Kd in late May 23. tolerating chemo well.  - M protein 3.8>3.1>3.0>2.9>2.8>2.1>1.7>1.3  - IgG 6072>>4098>2480>>2211>1700>1480  - Kappa LC 1944 mg/L>>920>685>435  - K/L ratio  589>438>>>457  - Partial response after ~ 8 months Rx  - Bone marrow 11/9/23  5-10% kappa restricted plasma cells.     2/7/2024 -  Bone Marrow Transplant    Collected on 1/29/24, only required 1 day.     Admitted on 2/5/24,  T=0, 2/7/24 for autologous transplant prepped with Melphalan 140 mg/m2.     Hospitalization complicated by:  1. chemotherapy-induced nausea and vomiting, managed with IV and oral anti-emetics, stable and tolerating PO at discharge  2. Diverticulitis, initially presented w/ abdominal pain, and demonstrated on CT AP. He was culture negative but treated w/ 7 days of IV zosyn and sent home on 7 days of flagy and levofloxacin.   3. Deconditioning, managed w/ PT while admitted, ordered home PT on discharge.  4. RUE edema 2/2 fluid overload and immobility of th R extremity due to a rotator cuff injury. Duplex sonography ordered while inpatient but cancelled at patient request due to delay in discharge. Patient and family reporting considerable improvement in RUE edema. Plan for outpatient US pending further evaluation by primary oncologist.          SUBJECTIVE:  History of Present Illness:  David presents to clinic 3/21/24 for a follow up visit with his wife and daughter.    States he is feeling great! Has been walking frequently when the weather permits. Leg pain has resolved with exercises.     Eating and drinking well.      Review of Systems   Constitutional: Negative.    HENT: Negative.     Eyes: Negative.    Respiratory: Negative.     Cardiovascular: Negative.    Gastrointestinal: Negative.    Endocrine: Negative.    Genitourinary: Negative.    Musculoskeletal:  Negative for myalgias.   Skin: Negative.    Allergic/Immunologic: Positive for immunocompromised state.   Neurological:  Positive for numbness.   Hematological: Negative.    Psychiatric/Behavioral: Negative.       OBJECTIVE:  KPS: Karnofsky Score: 80 - Normal activity with effort; some signs or symptoms of disease   VS:  /86   Pulse  98   Temp 36.2 °C (97.2 °F)   Resp 16   Wt 69.4 kg (153 lb)   SpO2 100%   BMI 25.46 kg/m²   BSA: 1.78 meters squared    Physical Exam  Constitutional:       Appearance: Normal appearance. He is normal weight.   HENT:      Head: Normocephalic and atraumatic.      Nose: Nose normal.      Mouth/Throat:      Mouth: Mucous membranes are moist.      Pharynx: Oropharynx is clear.   Eyes:      Extraocular Movements: Extraocular movements intact.      Conjunctiva/sclera: Conjunctivae normal.      Pupils: Pupils are equal, round, and reactive to light.   Cardiovascular:      Rate and Rhythm: Normal rate and regular rhythm.      Pulses: Normal pulses.      Heart sounds: Normal heart sounds.   Pulmonary:      Effort: Pulmonary effort is normal.      Breath sounds: Normal breath sounds.   Abdominal:      General: Abdomen is flat. Bowel sounds are normal.      Palpations: Abdomen is soft.   Musculoskeletal:         General: Normal range of motion.      Cervical back: Normal range of motion and neck supple.   Skin:     General: Skin is warm and dry.   Neurological:      General: No focal deficit present.      Mental Status: He is alert and oriented to person, place, and time. Mental status is at baseline.   Psychiatric:         Mood and Affect: Mood normal.         Behavior: Behavior normal.         Thought Content: Thought content normal.         Judgment: Judgment normal.       Laboratory:  The pertinent laboratory results were reviewed and discussed with the patient.    Lab Results   Component Value Date    WBC 7.8 03/11/2024    HCT 35.8 (L) 03/11/2024    HGB 11.7 (L) 03/11/2024     03/11/2024    ANC 5.04 02/27/2024    K 4.4 03/11/2024    CALCIUM 10.2 03/11/2024     03/11/2024    MG 2.00 03/11/2024    ALT 17 03/11/2024    AST 16 03/11/2024    BUN 17 03/11/2024    CREATININE 0.95 03/11/2024    PHOS 2.1 (L) 02/22/2024    KAPPA 27.43 (H) 01/25/2024    LAMBDA <0.17 (L) 01/25/2024    KAPLS  01/25/2024      Comment:       One or more analytes used in this calculation   is outside of the analytical measurement range.  Calculation cannot be performed.    SPEP Aberrant band detected. See immunofixation. 01/25/2024    IEPIN  01/25/2024 01/25/24 Known monoclonal IgG kappa in the gamma region at 0.9 g/dL. Last detected on 11/30/23 at 1.0 g/dL.     03/05/2024    IGM <5 (L) 01/25/2024    IGA <7 (L) 01/25/2024      Note: for a comprehensive list of the patient's lab results, access the Results Review activity.    ASSESSMENT and PLAN:    Plan:    Multiple Myeloma: IgG kappa. ISS stage II  - Bone marrow 30-40% kappa restricted plasma cells(1/27/23)  - VRd started in 3/2023 then switched to D-Kd in late May 23. tolerating chemo well.  - Partial response after ~ 8 months Rx  - Bone marrow 11/9/23  5-10% kappa restricted plasma cells.  - s/p autoSCT  on 2/5/24,  T=0, 2/7/24    ID:  - Acyclovir 400mg BID  - Pentamidine monthly, d/t Bactrim rash    Peripheral neuropathy  - bilateral LE's : on Gabapentin and Cymbalta  - bilateral CTS-> s/p repair. still symptomatic    R Rotator Cuff Tear:  - Continued pain   - Needs surgery, will consider s/p auto    RTC:  2 weeks     ERNESTO Barron-CNP

## 2024-03-25 DIAGNOSIS — C90.00 KAPPA LIGHT CHAIN MYELOMA (MULTI): ICD-10-CM

## 2024-03-25 RX ORDER — DULOXETIN HYDROCHLORIDE 30 MG/1
30 CAPSULE, DELAYED RELEASE ORAL NIGHTLY
Qty: 30 CAPSULE | Refills: 0 | Status: SHIPPED | OUTPATIENT
Start: 2024-03-25 | End: 2024-04-05 | Stop reason: SDUPTHER

## 2024-03-25 RX ORDER — DULOXETIN HYDROCHLORIDE 30 MG/1
30 CAPSULE, DELAYED RELEASE ORAL NIGHTLY
Qty: 90 CAPSULE | Refills: 2 | Status: SHIPPED | OUTPATIENT
Start: 2024-03-25 | End: 2024-03-25 | Stop reason: SDUPTHER

## 2024-03-25 NOTE — PROGRESS NOTES
Patient called and requested refills for duloxetine. Prescription sent for 90 day supply to [facility] pharmacy and 30 day supply to [facility] pharmacy. I recommended patient call [facility] to confirm they have the prescription and can fill prior to [facility].     Maritza Willoughby, PharmD  Ambulatory Stem Cell Transplant Pharmacist

## 2024-03-28 ENCOUNTER — NURSE TRIAGE (OUTPATIENT)
Dept: ADMISSION | Facility: HOSPITAL | Age: 79
End: 2024-03-28
Payer: MEDICARE

## 2024-03-28 DIAGNOSIS — R60.0 LOCALIZED EDEMA: Primary | ICD-10-CM

## 2024-03-28 DIAGNOSIS — C90.00 MULTIPLE MYELOMA, REMISSION STATUS UNSPECIFIED (MULTI): ICD-10-CM

## 2024-03-28 NOTE — TELEPHONE ENCOUNTER
David called in to report left ankle and calf swelling.   This has been ongoing intermittently for several months.  He has been using compression stockings, however, they do not seem to help. He states he has been walking a lot lately/has been up on his feet more that normal.     He states the left ankle is sore/ tender to touch.  No pain unless he touches it.    No redness or warmth.   Patient denies any fever, shortness of breath or chest pain.     Should we order an ultrasound to rule out a DVT?  Patient lives in [place].       Additional Information   Commented on: Where is your swelling?     Left calf and ankle    Protocols used: Swelling/Deep Venous Thrombosis (DVT)

## 2024-03-28 NOTE — TELEPHONE ENCOUNTER
I called David and let him know that the team would like him to have an ultrasound done.   I called [facility] at  as this is his preferred location. They do not have anything available. [place] does have availability all day so call made to David to see if he could make this location.    He is going to call his daughter to see if she could bring him.  He will call back.

## 2024-03-28 NOTE — TELEPHONE ENCOUNTER
Ultrasound scheduled for tomorrow 10am at [facility].     David agreeable to this time.  No further questions or concerns at this time.

## 2024-03-29 ENCOUNTER — DOCUMENTATION (OUTPATIENT)
Dept: HEMATOLOGY/ONCOLOGY | Facility: HOSPITAL | Age: 79
End: 2024-03-29

## 2024-03-29 ENCOUNTER — APPOINTMENT (OUTPATIENT)
Dept: RADIOLOGY | Facility: CLINIC | Age: 79
End: 2024-03-29
Payer: MEDICARE

## 2024-03-29 ENCOUNTER — HOSPITAL ENCOUNTER (OUTPATIENT)
Dept: VASCULAR MEDICINE | Facility: CLINIC | Age: 79
Discharge: HOME | End: 2024-03-29
Payer: MEDICARE

## 2024-03-29 DIAGNOSIS — C90.00 MULTIPLE MYELOMA, REMISSION STATUS UNSPECIFIED (MULTI): ICD-10-CM

## 2024-03-29 DIAGNOSIS — M79.605 PAIN IN LEFT LEG: ICD-10-CM

## 2024-03-29 DIAGNOSIS — M79.89 OTHER SPECIFIED SOFT TISSUE DISORDERS: ICD-10-CM

## 2024-03-29 DIAGNOSIS — R60.0 LOCALIZED EDEMA: ICD-10-CM

## 2024-03-29 PROCEDURE — 93970 EXTREMITY STUDY: CPT

## 2024-03-29 PROCEDURE — 93970 EXTREMITY STUDY: CPT | Performed by: INTERNAL MEDICINE

## 2024-03-29 NOTE — PROGRESS NOTES
Left 2 voicemail messages for David regarding vascular imaging findings and instructions on apixaban.     Called [facility], they have prescription ready for him for . Pharmacist also to reach out to patient regarding .

## 2024-04-02 ENCOUNTER — INFUSION (OUTPATIENT)
Dept: HEMATOLOGY/ONCOLOGY | Facility: HOSPITAL | Age: 79
End: 2024-04-02
Payer: MEDICARE

## 2024-04-02 ENCOUNTER — OFFICE VISIT (OUTPATIENT)
Dept: HEMATOLOGY/ONCOLOGY | Facility: HOSPITAL | Age: 79
End: 2024-04-02
Payer: MEDICARE

## 2024-04-02 VITALS
BODY MASS INDEX: 26.34 KG/M2 | HEART RATE: 91 BPM | DIASTOLIC BLOOD PRESSURE: 78 MMHG | HEIGHT: 65 IN | TEMPERATURE: 98.8 F | RESPIRATION RATE: 18 BRPM | SYSTOLIC BLOOD PRESSURE: 148 MMHG | WEIGHT: 158.07 LBS | OXYGEN SATURATION: 100 %

## 2024-04-02 DIAGNOSIS — C90.00 IGG MULTIPLE MYELOMA (MULTI): ICD-10-CM

## 2024-04-02 DIAGNOSIS — D84.9 IMMUNOCOMPROMISED STATE (MULTI): ICD-10-CM

## 2024-04-02 DIAGNOSIS — C90.00 MULTIPLE MYELOMA NOT HAVING ACHIEVED REMISSION (MULTI): ICD-10-CM

## 2024-04-02 DIAGNOSIS — Z94.84 STEM CELLS TRANSPLANT STATUS (MULTI): ICD-10-CM

## 2024-04-02 DIAGNOSIS — C90.00 MULTIPLE MYELOMA, REMISSION STATUS UNSPECIFIED (MULTI): ICD-10-CM

## 2024-04-02 DIAGNOSIS — I82.4Y3 ACUTE DEEP VEIN THROMBOSIS (DVT) OF PROXIMAL VEIN OF BOTH LOWER EXTREMITIES (MULTI): ICD-10-CM

## 2024-04-02 DIAGNOSIS — C90.00 MULTIPLE MYELOMA NOT HAVING ACHIEVED REMISSION (MULTI): Primary | ICD-10-CM

## 2024-04-02 LAB
ALBUMIN SERPL BCP-MCNC: 3.7 G/DL (ref 3.4–5)
ALP SERPL-CCNC: 91 U/L (ref 33–136)
ALT SERPL W P-5'-P-CCNC: 15 U/L (ref 10–52)
ANION GAP SERPL CALC-SCNC: 12 MMOL/L (ref 10–20)
AST SERPL W P-5'-P-CCNC: 16 U/L (ref 9–39)
BASOPHILS # BLD AUTO: 0.04 X10*3/UL (ref 0–0.1)
BASOPHILS NFR BLD AUTO: 0.5 %
BILIRUB SERPL-MCNC: 0.4 MG/DL (ref 0–1.2)
BUN SERPL-MCNC: 13 MG/DL (ref 6–23)
CALCIUM SERPL-MCNC: 9.9 MG/DL (ref 8.6–10.3)
CHLORIDE SERPL-SCNC: 107 MMOL/L (ref 98–107)
CO2 SERPL-SCNC: 26 MMOL/L (ref 21–32)
CREAT SERPL-MCNC: 0.75 MG/DL (ref 0.5–1.3)
EGFRCR SERPLBLD CKD-EPI 2021: >90 ML/MIN/1.73M*2
EOSINOPHIL # BLD AUTO: 0.24 X10*3/UL (ref 0–0.4)
EOSINOPHIL NFR BLD AUTO: 3 %
ERYTHROCYTE [DISTWIDTH] IN BLOOD BY AUTOMATED COUNT: 14.3 % (ref 11.5–14.5)
GLUCOSE SERPL-MCNC: 91 MG/DL (ref 74–99)
HCT VFR BLD AUTO: 37.6 % (ref 41–52)
HGB BLD-MCNC: 12.5 G/DL (ref 13.5–17.5)
IGA SERPL-MCNC: <7 MG/DL (ref 70–400)
IGG SERPL-MCNC: 656 MG/DL (ref 700–1600)
IGM SERPL-MCNC: <5 MG/DL (ref 40–230)
IMM GRANULOCYTES # BLD AUTO: 0.03 X10*3/UL (ref 0–0.5)
IMM GRANULOCYTES NFR BLD AUTO: 0.4 % (ref 0–0.9)
LYMPHOCYTES # BLD AUTO: 2.18 X10*3/UL (ref 0.8–3)
LYMPHOCYTES NFR BLD AUTO: 26.8 %
MAGNESIUM SERPL-MCNC: 1.86 MG/DL (ref 1.6–2.4)
MCH RBC QN AUTO: 33 PG (ref 26–34)
MCHC RBC AUTO-ENTMCNC: 33.2 G/DL (ref 32–36)
MCV RBC AUTO: 99 FL (ref 80–100)
MONOCYTES # BLD AUTO: 0.68 X10*3/UL (ref 0.05–0.8)
MONOCYTES NFR BLD AUTO: 8.4 %
NEUTROPHILS # BLD AUTO: 4.95 X10*3/UL (ref 1.6–5.5)
NEUTROPHILS NFR BLD AUTO: 60.9 %
NRBC BLD-RTO: 0 /100 WBCS (ref 0–0)
PLATELET # BLD AUTO: 139 X10*3/UL (ref 150–450)
POTASSIUM SERPL-SCNC: 4 MMOL/L (ref 3.5–5.3)
PROT SERPL-MCNC: 5.8 G/DL (ref 6.4–8.2)
PROT SERPL-MCNC: 6.1 G/DL (ref 6.4–8.2)
RBC # BLD AUTO: 3.79 X10*6/UL (ref 4.5–5.9)
SODIUM SERPL-SCNC: 141 MMOL/L (ref 136–145)
URATE SERPL-MCNC: 6.1 MG/DL (ref 4–7.5)
WBC # BLD AUTO: 8.1 X10*3/UL (ref 4.4–11.3)

## 2024-04-02 PROCEDURE — 2500000005 HC RX 250 GENERAL PHARMACY W/O HCPCS: Performed by: INTERNAL MEDICINE

## 2024-04-02 PROCEDURE — 99215 OFFICE O/P EST HI 40 MIN: CPT

## 2024-04-02 PROCEDURE — 84155 ASSAY OF PROTEIN SERUM: CPT | Mod: 59

## 2024-04-02 PROCEDURE — 84550 ASSAY OF BLOOD/URIC ACID: CPT

## 2024-04-02 PROCEDURE — 96365 THER/PROPH/DIAG IV INF INIT: CPT | Mod: INF

## 2024-04-02 PROCEDURE — 1159F MED LIST DOCD IN RCRD: CPT

## 2024-04-02 PROCEDURE — 85025 COMPLETE CBC W/AUTO DIFF WBC: CPT

## 2024-04-02 PROCEDURE — 82784 ASSAY IGA/IGD/IGG/IGM EACH: CPT

## 2024-04-02 PROCEDURE — 83735 ASSAY OF MAGNESIUM: CPT

## 2024-04-02 PROCEDURE — 84165 PROTEIN E-PHORESIS SERUM: CPT | Performed by: PATHOLOGY

## 2024-04-02 PROCEDURE — 83521 IG LIGHT CHAINS FREE EACH: CPT

## 2024-04-02 PROCEDURE — 80053 COMPREHEN METABOLIC PANEL: CPT

## 2024-04-02 PROCEDURE — 99215 OFFICE O/P EST HI 40 MIN: CPT | Mod: 25

## 2024-04-02 PROCEDURE — 2500000004 HC RX 250 GENERAL PHARMACY W/ HCPCS (ALT 636 FOR OP/ED): Performed by: INTERNAL MEDICINE

## 2024-04-02 PROCEDURE — 86320 SERUM IMMUNOELECTROPHORESIS: CPT | Performed by: PATHOLOGY

## 2024-04-02 PROCEDURE — 1160F RVW MEDS BY RX/DR IN RCRD: CPT

## 2024-04-02 PROCEDURE — 86334 IMMUNOFIX E-PHORESIS SERUM: CPT

## 2024-04-02 RX ORDER — DIPHENHYDRAMINE HYDROCHLORIDE 50 MG/ML
50 INJECTION INTRAMUSCULAR; INTRAVENOUS AS NEEDED
Status: CANCELLED | OUTPATIENT
Start: 2024-04-30

## 2024-04-02 RX ORDER — HEPARIN SODIUM 1000 [USP'U]/ML
2000 INJECTION, SOLUTION INTRAVENOUS; SUBCUTANEOUS AS NEEDED
Status: CANCELLED
Start: 2024-04-02

## 2024-04-02 RX ORDER — ALBUTEROL SULFATE 0.83 MG/ML
3 SOLUTION RESPIRATORY (INHALATION) AS NEEDED
Status: CANCELLED | OUTPATIENT
Start: 2024-04-30

## 2024-04-02 RX ORDER — EPINEPHRINE 0.3 MG/.3ML
0.3 INJECTION SUBCUTANEOUS EVERY 5 MIN PRN
Status: CANCELLED | OUTPATIENT
Start: 2024-04-30

## 2024-04-02 RX ORDER — HEPARIN SODIUM,PORCINE/PF 10 UNIT/ML
50 SYRINGE (ML) INTRAVENOUS AS NEEDED
Status: CANCELLED | OUTPATIENT
Start: 2024-04-02

## 2024-04-02 RX ORDER — ONDANSETRON HYDROCHLORIDE 8 MG/1
8 TABLET, FILM COATED ORAL ONCE
Status: COMPLETED | OUTPATIENT
Start: 2024-04-02 | End: 2024-04-02

## 2024-04-02 RX ORDER — ONDANSETRON HYDROCHLORIDE 8 MG/1
8 TABLET, FILM COATED ORAL ONCE
Status: CANCELLED | OUTPATIENT
Start: 2024-04-30

## 2024-04-02 RX ORDER — HEPARIN 100 UNIT/ML
500 SYRINGE INTRAVENOUS AS NEEDED
Status: CANCELLED | OUTPATIENT
Start: 2024-04-02

## 2024-04-02 RX ORDER — FAMOTIDINE 10 MG/ML
20 INJECTION INTRAVENOUS ONCE AS NEEDED
Status: CANCELLED | OUTPATIENT
Start: 2024-04-30

## 2024-04-02 RX ADMIN — ONDANSETRON HYDROCHLORIDE 8 MG: 8 TABLET, FILM COATED ORAL at 10:41

## 2024-04-02 RX ADMIN — PENTAMIDINE ISETHIONATE 288 MG: 300 INJECTION, POWDER, LYOPHILIZED, FOR SOLUTION INTRAMUSCULAR; INTRAVENOUS at 11:08

## 2024-04-02 ASSESSMENT — ENCOUNTER SYMPTOMS
GASTROINTESTINAL NEGATIVE: 1
NUMBNESS: 1
ENDOCRINE NEGATIVE: 1
MUSCULOSKELETAL NEGATIVE: 1
CONSTITUTIONAL NEGATIVE: 1
EYES NEGATIVE: 1
HEMATOLOGIC/LYMPHATIC NEGATIVE: 1
CARDIOVASCULAR NEGATIVE: 1
PSYCHIATRIC NEGATIVE: 1
RESPIRATORY NEGATIVE: 1

## 2024-04-02 NOTE — PROGRESS NOTES
Pt arrived ambulatory got pentamidine treatment.  Denies any new or worsening symptoms.  Tolerated infusion without issue.  Discharged in stable condition.

## 2024-04-03 LAB
KAPPA LC SERPL-MCNC: 0.44 MG/DL (ref 0.33–1.94)
KAPPA LC/LAMBDA SER: ABNORMAL {RATIO}
LAMBDA LC SERPL-MCNC: <0.17 MG/DL (ref 0.57–2.63)

## 2024-04-03 NOTE — PROGRESS NOTES
Patient ID: David Ferreira is a 78 y.o. male.  Referring Physician: No referring provider defined for this encounter.  Primary Care Provider: No Assigned PCP Generic Provider, MD  Date of Service:  4/2/2024    Chief Complaint: IgG Kappa Multiple Myeloma      Interval History:      Patient is with newly diagnosed Multiple myeloma IgG kappa. He is here for further discussion re: auto SCT as part of consolidation therapy for Multiple Myeloma.     Recent BW showed elevated protein-> further work up c/w Multiple Myeloma. Bone marrow Bx(1/27/23) 30-40% kappa restricted plasma cells FISH without high risk features M protein 3.8 g/dL  B2M 3.8 IgG 6072 free KLC 1944, LLC 3.3  Kappa/Lambda ratio 589   alb 4.0  CT/PET 1/30/23 showed no abnormal lesions.  Patient denies recent weight loss, night sweats, poor appetites. He c/o right shoulder pain 2/2 rotator cuff tear.  He also c/o numbing/tingling both hands 2/2 CTS and bilateral LE pain for which he takes Gabapentin daily.    Treatment:    Velcade, Revlimid, Dexamethasone  Started in 3/2023.     Daratumumab, Kyprolis, Dexamethasone:  Therapy switched in 5/2023 due to slow response to therapy  Achieved [place]     Autologous SCT:  Prep w/ Sonja 140  T=0. 2/7/24    PMHx: Peripheral neuropathy bilateral LE's 2016 on gabapentin   Rotator cuff tear right.  ~6 month ago     PSHx: CTS repair bilateral: right ~ 2 years ago, left 3-4 months ago     Fhx: mother with colon cancer at 74  Father with CAD at 59       Oncology History   Multiple myeloma (CMS/HCC)   11/5/2023 Initial Diagnosis    Multiple Myeloma: IgG kappa. ISS stage II  - Bone marrow 30-40% kappa restricted plasma cells  - Fish negative   - CT/PET: negative  - b2M 3.8   - normal Ca++, Cr, LDH, alb  - VRd started in 3/2023 then switched to D-Kd in late May 23. tolerating chemo well.  - M protein 3.8>3.1>3.0>2.9>2.8>2.1>1.7>1.3  - IgG 6072>>4098>2480>>2211>1700>1480  - Kappa LC 1944 mg/L>>920>685>435  - K/L ratio  589>438>>>457  - Partial response after ~ 8 months Rx  - Bone marrow 11/9/23  5-10% kappa restricted plasma cells.     2/7/2024 -  Bone Marrow Transplant    Collected on 1/29/24, only required 1 day.     Admitted on 2/5/24,  T=0, 2/7/24 for autologous transplant prepped with Melphalan 140 mg/m2.     Hospitalization complicated by:  1. chemotherapy-induced nausea and vomiting, managed with IV and oral anti-emetics, stable and tolerating PO at discharge  2. Diverticulitis, initially presented w/ abdominal pain, and demonstrated on CT AP. He was culture negative but treated w/ 7 days of IV zosyn and sent home on 7 days of flagy and levofloxacin.   3. Deconditioning, managed w/ PT while admitted, ordered home PT on discharge.  4. RUE edema 2/2 fluid overload and immobility of th R extremity due to a rotator cuff injury. Duplex sonography ordered while inpatient but cancelled at patient request due to delay in discharge. Patient and family reporting considerable improvement in RUE edema. Plan for outpatient US pending further evaluation by primary oncologist.          SUBJECTIVE:  History of Present Illness:  David presents to clinic 4/2/24 for a follow up visit with his wife and son.     Overall he is doing well.    Had leg pain last wee, found to have bilateral DVTs, taking Eliquis without issues. No pain.     Eating and drinking well, enjoyed Easter with his family.      Review of Systems   Constitutional: Negative.    HENT: Negative.     Eyes: Negative.    Respiratory: Negative.     Cardiovascular: Negative.    Gastrointestinal: Negative.    Endocrine: Negative.    Genitourinary: Negative.    Musculoskeletal: Negative.    Skin: Negative.    Allergic/Immunologic: Positive for immunocompromised state.   Neurological:  Positive for numbness.   Hematological: Negative.    Psychiatric/Behavioral: Negative.       OBJECTIVE:  KPS: Karnofsky Score: 80 - Normal activity with effort; some signs or symptoms of disease   VS:   There were no vitals taken for this visit.  BSA: There is no height or weight on file to calculate BSA.    Physical Exam  Constitutional:       Appearance: Normal appearance. He is normal weight.   HENT:      Head: Normocephalic and atraumatic.      Nose: Nose normal.      Mouth/Throat:      Mouth: Mucous membranes are moist.      Pharynx: Oropharynx is clear.   Eyes:      Extraocular Movements: Extraocular movements intact.      Conjunctiva/sclera: Conjunctivae normal.      Pupils: Pupils are equal, round, and reactive to light.   Cardiovascular:      Rate and Rhythm: Normal rate and regular rhythm.      Pulses: Normal pulses.      Heart sounds: Normal heart sounds.   Pulmonary:      Effort: Pulmonary effort is normal.      Breath sounds: Normal breath sounds.   Abdominal:      General: Abdomen is flat. Bowel sounds are normal.      Palpations: Abdomen is soft.   Musculoskeletal:         General: Normal range of motion.      Cervical back: Normal range of motion and neck supple.   Skin:     General: Skin is warm and dry.   Neurological:      General: No focal deficit present.      Mental Status: He is alert and oriented to person, place, and time. Mental status is at baseline.   Psychiatric:         Mood and Affect: Mood normal.         Behavior: Behavior normal.         Thought Content: Thought content normal.         Judgment: Judgment normal.       Laboratory:  The pertinent laboratory results were reviewed and discussed with the patient.    Lab Results   Component Value Date    WBC 8.1 04/02/2024    HCT 37.6 (L) 04/02/2024    HGB 12.5 (L) 04/02/2024     (L) 04/02/2024    ANC 5.04 02/27/2024    K 4.0 04/02/2024    CALCIUM 9.9 04/02/2024     04/02/2024    MG 1.86 04/02/2024    ALT 15 04/02/2024    AST 16 04/02/2024    BUN 13 04/02/2024    CREATININE 0.75 04/02/2024    PHOS 2.1 (L) 02/22/2024    KAPPA 27.43 (H) 01/25/2024    LAMBDA <0.17 (L) 01/25/2024    KAPLS  01/25/2024      Comment:      One or  more analytes used in this calculation   is outside of the analytical measurement range.  Calculation cannot be performed.    SPEP Aberrant band detected. See immunofixation. 01/25/2024    IEPIN  01/25/2024 01/25/24 Known monoclonal IgG kappa in the gamma region at 0.9 g/dL. Last detected on 11/30/23 at 1.0 g/dL.     (L) 04/02/2024    IGM <5 (L) 04/02/2024    IGA <7 (L) 04/02/2024      Note: for a comprehensive list of the patient's lab results, access the Results Review activity.    ASSESSMENT and PLAN:    Plan:    Multiple Myeloma: IgG kappa. ISS stage II  - Bone marrow 30-40% kappa restricted plasma cells(1/27/23)  - VRd started in 3/2023 then switched to D-Kd in late May 23. tolerating chemo well.  - Partial response after ~ 8 months Rx  - Bone marrow 11/9/23  5-10% kappa restricted plasma cells.  - s/p autoSCT  on 2/5/24,  T=0, 2/7/24    ID:  - Acyclovir 400mg BID  - Pentamidine monthly (plan for last dose on 4/30), d/t Bactrim rash    Peripheral neuropathy  - bilateral LE's : on Gabapentin and Cymbalta  - bilateral CTS-> s/p repair. 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 Diane Harrell, APRN-CNP

## 2024-04-05 ENCOUNTER — TELEPHONE (OUTPATIENT)
Dept: HEMATOLOGY/ONCOLOGY | Facility: HOSPITAL | Age: 79
End: 2024-04-05
Payer: MEDICARE

## 2024-04-05 LAB
ALBUMIN: 3.7 G/DL (ref 3.4–5)
ALPHA 1 GLOBULIN: 0.3 G/DL (ref 0.2–0.6)
ALPHA 2 GLOBULIN: 0.8 G/DL (ref 0.4–1.1)
BETA GLOBULIN: 0.5 G/DL (ref 0.5–1.2)
GAMMA GLOBULIN: 0.5 G/DL (ref 0.5–1.4)
IMMUNOFIXATION COMMENT: ABNORMAL
M-PROTEIN 1: 0.3 G/DL
PATH REVIEW - SERUM IMMUNOFIXATION: ABNORMAL
PATH REVIEW-SERUM PROTEIN ELECTROPHORESIS: ABNORMAL
PROTEIN ELECTROPHORESIS COMMENT: ABNORMAL

## 2024-04-05 NOTE — TELEPHONE ENCOUNTER
Patient called and requested refills for apixaban and duloxetine. He would prefer 90 day prescriptions be sent to [facility]. Will reach out to the team so they can prescribe refills.

## 2024-04-15 ENCOUNTER — TELEPHONE (OUTPATIENT)
Dept: ADMISSION | Facility: HOSPITAL | Age: 79
End: 2024-04-15
Payer: MEDICARE

## 2024-04-15 DIAGNOSIS — C90.00 KAPPA LIGHT CHAIN MYELOMA (MULTI): Primary | ICD-10-CM

## 2024-04-15 RX ORDER — DULOXETIN HYDROCHLORIDE 30 MG/1
30 CAPSULE, DELAYED RELEASE ORAL NIGHTLY
Qty: 90 CAPSULE | Refills: 0 | Status: SHIPPED | OUTPATIENT
Start: 2024-04-15 | End: 2024-07-14

## 2024-04-15 NOTE — TELEPHONE ENCOUNTER
Pt needs script for cymbalta sent to [facility]- 90 day script is much more cost effective with mail order services.

## 2024-04-30 ENCOUNTER — APPOINTMENT (OUTPATIENT)
Dept: HEMATOLOGY/ONCOLOGY | Facility: HOSPITAL | Age: 79
End: 2024-04-30
Payer: MEDICARE

## 2024-05-02 ENCOUNTER — INFUSION (OUTPATIENT)
Dept: HEMATOLOGY/ONCOLOGY | Facility: HOSPITAL | Age: 79
End: 2024-05-02
Payer: MEDICARE

## 2024-05-02 ENCOUNTER — OFFICE VISIT (OUTPATIENT)
Dept: HEMATOLOGY/ONCOLOGY | Facility: HOSPITAL | Age: 79
End: 2024-05-02
Payer: MEDICARE

## 2024-05-02 VITALS
WEIGHT: 161.38 LBS | OXYGEN SATURATION: 100 % | DIASTOLIC BLOOD PRESSURE: 82 MMHG | HEART RATE: 73 BPM | BODY MASS INDEX: 26.85 KG/M2 | SYSTOLIC BLOOD PRESSURE: 152 MMHG | TEMPERATURE: 97 F | RESPIRATION RATE: 17 BRPM

## 2024-05-02 DIAGNOSIS — C90.00 MULTIPLE MYELOMA NOT HAVING ACHIEVED REMISSION (MULTI): ICD-10-CM

## 2024-05-02 DIAGNOSIS — C90.00 MULTIPLE MYELOMA, REMISSION STATUS UNSPECIFIED (MULTI): ICD-10-CM

## 2024-05-02 DIAGNOSIS — C90.00 IGG MULTIPLE MYELOMA (MULTI): ICD-10-CM

## 2024-05-02 LAB
ALBUMIN SERPL BCP-MCNC: 4.1 G/DL (ref 3.4–5)
ALP SERPL-CCNC: 108 U/L (ref 33–136)
ALT SERPL W P-5'-P-CCNC: 12 U/L (ref 10–52)
ANION GAP SERPL CALC-SCNC: 12 MMOL/L (ref 10–20)
AST SERPL W P-5'-P-CCNC: 14 U/L (ref 9–39)
BASOPHILS # BLD AUTO: 0.04 X10*3/UL (ref 0–0.1)
BASOPHILS NFR BLD AUTO: 0.5 %
BILIRUB SERPL-MCNC: 0.3 MG/DL (ref 0–1.2)
BUN SERPL-MCNC: 14 MG/DL (ref 6–23)
CALCIUM SERPL-MCNC: 10 MG/DL (ref 8.6–10.3)
CHLORIDE SERPL-SCNC: 107 MMOL/L (ref 98–107)
CO2 SERPL-SCNC: 26 MMOL/L (ref 21–32)
CREAT SERPL-MCNC: 0.77 MG/DL (ref 0.5–1.3)
EGFRCR SERPLBLD CKD-EPI 2021: >90 ML/MIN/1.73M*2
EOSINOPHIL # BLD AUTO: 0.11 X10*3/UL (ref 0–0.4)
EOSINOPHIL NFR BLD AUTO: 1.4 %
ERYTHROCYTE [DISTWIDTH] IN BLOOD BY AUTOMATED COUNT: 13.2 % (ref 11.5–14.5)
GLUCOSE SERPL-MCNC: 101 MG/DL (ref 74–99)
HCT VFR BLD AUTO: 43.9 % (ref 41–52)
HGB BLD-MCNC: 14.6 G/DL (ref 13.5–17.5)
IGG SERPL-MCNC: 460 MG/DL (ref 700–1600)
IMM GRANULOCYTES # BLD AUTO: 0.12 X10*3/UL (ref 0–0.5)
IMM GRANULOCYTES NFR BLD AUTO: 1.5 % (ref 0–0.9)
LYMPHOCYTES # BLD AUTO: 1.64 X10*3/UL (ref 0.8–3)
LYMPHOCYTES NFR BLD AUTO: 20.2 %
MAGNESIUM SERPL-MCNC: 2.02 MG/DL (ref 1.6–2.4)
MCH RBC QN AUTO: 31.6 PG (ref 26–34)
MCHC RBC AUTO-ENTMCNC: 33.3 G/DL (ref 32–36)
MCV RBC AUTO: 95 FL (ref 80–100)
MONOCYTES # BLD AUTO: 0.65 X10*3/UL (ref 0.05–0.8)
MONOCYTES NFR BLD AUTO: 8 %
NEUTROPHILS # BLD AUTO: 5.56 X10*3/UL (ref 1.6–5.5)
NEUTROPHILS NFR BLD AUTO: 68.4 %
NRBC BLD-RTO: 0 /100 WBCS (ref 0–0)
PLATELET # BLD AUTO: 167 X10*3/UL (ref 150–450)
POTASSIUM SERPL-SCNC: 4.1 MMOL/L (ref 3.5–5.3)
PROT SERPL-MCNC: 6.2 G/DL (ref 6.4–8.2)
RBC # BLD AUTO: 4.62 X10*6/UL (ref 4.5–5.9)
SODIUM SERPL-SCNC: 141 MMOL/L (ref 136–145)
URATE SERPL-MCNC: 6.6 MG/DL (ref 4–7.5)
WBC # BLD AUTO: 8.1 X10*3/UL (ref 4.4–11.3)

## 2024-05-02 PROCEDURE — 99215 OFFICE O/P EST HI 40 MIN: CPT | Performed by: INTERNAL MEDICINE

## 2024-05-02 PROCEDURE — 1159F MED LIST DOCD IN RCRD: CPT | Performed by: INTERNAL MEDICINE

## 2024-05-02 PROCEDURE — 1036F TOBACCO NON-USER: CPT | Performed by: INTERNAL MEDICINE

## 2024-05-02 PROCEDURE — 96365 THER/PROPH/DIAG IV INF INIT: CPT | Mod: INF

## 2024-05-02 PROCEDURE — 85025 COMPLETE CBC W/AUTO DIFF WBC: CPT

## 2024-05-02 PROCEDURE — 2500000004 HC RX 250 GENERAL PHARMACY W/ HCPCS (ALT 636 FOR OP/ED): Performed by: INTERNAL MEDICINE

## 2024-05-02 PROCEDURE — 1126F AMNT PAIN NOTED NONE PRSNT: CPT | Performed by: INTERNAL MEDICINE

## 2024-05-02 PROCEDURE — 1160F RVW MEDS BY RX/DR IN RCRD: CPT | Performed by: INTERNAL MEDICINE

## 2024-05-02 PROCEDURE — 2500000005 HC RX 250 GENERAL PHARMACY W/O HCPCS: Performed by: INTERNAL MEDICINE

## 2024-05-02 PROCEDURE — 84550 ASSAY OF BLOOD/URIC ACID: CPT

## 2024-05-02 PROCEDURE — 83735 ASSAY OF MAGNESIUM: CPT

## 2024-05-02 PROCEDURE — 80053 COMPREHEN METABOLIC PANEL: CPT

## 2024-05-02 PROCEDURE — 82784 ASSAY IGA/IGD/IGG/IGM EACH: CPT

## 2024-05-02 RX ORDER — HEPARIN SODIUM,PORCINE/PF 10 UNIT/ML
50 SYRINGE (ML) INTRAVENOUS AS NEEDED
OUTPATIENT
Start: 2024-05-02

## 2024-05-02 RX ORDER — ONDANSETRON HYDROCHLORIDE 8 MG/1
8 TABLET, FILM COATED ORAL ONCE
Status: COMPLETED | OUTPATIENT
Start: 2024-05-02 | End: 2024-05-02

## 2024-05-02 RX ORDER — EPINEPHRINE 0.3 MG/.3ML
0.3 INJECTION SUBCUTANEOUS EVERY 5 MIN PRN
OUTPATIENT
Start: 2024-05-28

## 2024-05-02 RX ORDER — HEPARIN SODIUM 1000 [USP'U]/ML
2000 INJECTION, SOLUTION INTRAVENOUS; SUBCUTANEOUS AS NEEDED
Start: 2024-05-02

## 2024-05-02 RX ORDER — ONDANSETRON HYDROCHLORIDE 8 MG/1
8 TABLET, FILM COATED ORAL ONCE
OUTPATIENT
Start: 2024-05-28

## 2024-05-02 RX ORDER — DIPHENHYDRAMINE HYDROCHLORIDE 50 MG/ML
50 INJECTION INTRAMUSCULAR; INTRAVENOUS AS NEEDED
Status: DISCONTINUED | OUTPATIENT
Start: 2024-05-02 | End: 2024-05-02 | Stop reason: HOSPADM

## 2024-05-02 RX ORDER — EPINEPHRINE 0.3 MG/.3ML
0.3 INJECTION SUBCUTANEOUS EVERY 5 MIN PRN
Status: DISCONTINUED | OUTPATIENT
Start: 2024-05-02 | End: 2024-05-02 | Stop reason: HOSPADM

## 2024-05-02 RX ORDER — HEPARIN 100 UNIT/ML
500 SYRINGE INTRAVENOUS AS NEEDED
OUTPATIENT
Start: 2024-05-02

## 2024-05-02 RX ORDER — DIPHENHYDRAMINE HYDROCHLORIDE 50 MG/ML
50 INJECTION INTRAMUSCULAR; INTRAVENOUS AS NEEDED
OUTPATIENT
Start: 2024-05-28

## 2024-05-02 RX ORDER — ALBUTEROL SULFATE 0.83 MG/ML
3 SOLUTION RESPIRATORY (INHALATION) AS NEEDED
Status: DISCONTINUED | OUTPATIENT
Start: 2024-05-02 | End: 2024-05-02 | Stop reason: HOSPADM

## 2024-05-02 RX ORDER — ALBUTEROL SULFATE 0.83 MG/ML
3 SOLUTION RESPIRATORY (INHALATION) AS NEEDED
OUTPATIENT
Start: 2024-05-28

## 2024-05-02 RX ORDER — FAMOTIDINE 10 MG/ML
20 INJECTION INTRAVENOUS ONCE AS NEEDED
OUTPATIENT
Start: 2024-05-28

## 2024-05-02 RX ORDER — FAMOTIDINE 10 MG/ML
20 INJECTION INTRAVENOUS ONCE AS NEEDED
Status: DISCONTINUED | OUTPATIENT
Start: 2024-05-02 | End: 2024-05-02 | Stop reason: HOSPADM

## 2024-05-02 RX ADMIN — ONDANSETRON HYDROCHLORIDE 8 MG: 8 TABLET, FILM COATED ORAL at 11:21

## 2024-05-02 RX ADMIN — PENTAMIDINE ISETHIONATE 294 MG: 300 INJECTION, POWDER, LYOPHILIZED, FOR SOLUTION INTRAMUSCULAR; INTRAVENOUS at 11:38

## 2024-05-02 ASSESSMENT — ENCOUNTER SYMPTOMS
CONSTITUTIONAL NEGATIVE: 1
CARDIOVASCULAR NEGATIVE: 1
NUMBNESS: 1
EYES NEGATIVE: 1
RESPIRATORY NEGATIVE: 1
GASTROINTESTINAL NEGATIVE: 1
PSYCHIATRIC NEGATIVE: 1
MUSCULOSKELETAL NEGATIVE: 1
ENDOCRINE NEGATIVE: 1
HEMATOLOGIC/LYMPHATIC NEGATIVE: 1

## 2024-05-02 ASSESSMENT — PAIN SCALES - GENERAL: PAINLEVEL: 0-NO PAIN

## 2024-05-02 NOTE — PROGRESS NOTES
Patient arrived to infusion for pentamidine accompanied by wife and daughter. Infusion tolerated without difficulty. Patient discharged to home in stable condition.

## 2024-05-02 NOTE — PROGRESS NOTES
Patient ID: David Ferreira is a 78 y.o. male.  Referring Physician: No referring provider defined for this encounter.  Primary Care Provider: No Assigned PCP Generic Provider, MD  Date of Service:  5/2/2024    Chief Complaint: IgG Kappa Multiple Myeloma      Interval History:      Patient is with newly diagnosed Multiple myeloma IgG kappa. He is here for further discussion re: auto SCT as part of consolidation therapy for Multiple Myeloma.     Recent BW showed elevated protein-> further work up c/w Multiple Myeloma. Bone marrow Bx(1/27/23) 30-40% kappa restricted plasma cells FISH without high risk features M protein 3.8 g/dL  B2M 3.8 IgG 6072 free KLC 1944, LLC 3.3  Kappa/Lambda ratio 589   alb 4.0  CT/PET 1/30/23 showed no abnormal lesions.  Patient denies recent weight loss, night sweats, poor appetites. He c/o right shoulder pain 2/2 rotator cuff tear.  He also c/o numbing/tingling both hands 2/2 CTS and bilateral LE pain for which he takes Gabapentin daily.    Treatment:    Velcade, Revlimid, Dexamethasone  Started in 3/2023.     Daratumumab, Kyprolis, Dexamethasone:  Therapy switched in 5/2023 due to slow response to therapy  Achieved [place]     Autologous SCT:  Prep w/ Sonja 140  T=0. 2/7/24    PMHx: Peripheral neuropathy bilateral LE's 2016 on gabapentin   Rotator cuff tear right.  ~6 month ago     PSHx: CTS repair bilateral: right ~ 2 years ago, left 3-4 months ago     Fhx: mother with colon cancer at 74  Father with CAD at 59       Oncology History   Multiple myeloma (Multi)   11/5/2023 Initial Diagnosis    Multiple Myeloma: IgG kappa. ISS stage II  - Bone marrow 30-40% kappa restricted plasma cells  - Fish negative   - CT/PET: negative  - b2M 3.8   - normal Ca++, Cr, LDH, alb  - VRd started in 3/2023 then switched to D-Kd in late May 23. tolerating chemo well.  - M protein 3.8>3.1>3.0>2.9>2.8>2.1>1.7>1.3  - IgG 6072>>4098>2480>>2211>1700>1480  - Kappa LC 1944 mg/L>>920>685>435  - K/L ratio  589>438>>>457  - Partial response after ~ 8 months Rx  - Bone marrow 11/9/23  5-10% kappa restricted plasma cells.     2/7/2024 -  Bone Marrow Transplant    Collected on 1/29/24, only required 1 day.     Admitted on 2/5/24,  T=0, 2/7/24 for autologous transplant prepped with Melphalan 140 mg/m2.     Hospitalization complicated by:  1. chemotherapy-induced nausea and vomiting, managed with IV and oral anti-emetics, stable and tolerating PO at discharge  2. Diverticulitis, initially presented w/ abdominal pain, and demonstrated on CT AP. He was culture negative but treated w/ 7 days of IV zosyn and sent home on 7 days of flagy and levofloxacin.   3. Deconditioning, managed w/ PT while admitted, ordered home PT on discharge.  4. RUE edema 2/2 fluid overload and immobility of th R extremity due to a rotator cuff injury. Duplex sonography ordered while inpatient but cancelled at patient request due to delay in discharge. Patient and family reporting considerable improvement in RUE edema. Plan for outpatient US pending further evaluation by primary oncologist.          SUBJECTIVE:  History of Present Illness:  David presents to clinic 5/2/24 for a follow up visit with his wife and son.     Overall he is doing well.    Had leg pain one month ago, found to have bilateral DVTs, taking Eliquis without issues. No pain.     No other issues.      Review of Systems   Constitutional: Negative.    HENT: Negative.     Eyes: Negative.    Respiratory: Negative.     Cardiovascular: Negative.    Gastrointestinal: Negative.    Endocrine: Negative.    Genitourinary: Negative.    Musculoskeletal: Negative.    Skin: Negative.    Allergic/Immunologic: Positive for immunocompromised state.   Neurological:  Positive for numbness.   Hematological: Negative.    Psychiatric/Behavioral: Negative.       OBJECTIVE:  KPS: Karnofsky Score: 80 - Normal activity with effort; some signs or symptoms of disease   VS:  /82   Pulse 73   Temp 36.1 °C  (97 °F)   Resp 17   Wt 73.2 kg (161 lb 6 oz)   SpO2 100%   BMI 26.85 kg/m²   BSA: 1.83 meters squared    Physical Exam  Constitutional:       Appearance: Normal appearance. He is normal weight.   HENT:      Head: Normocephalic and atraumatic.      Nose: Nose normal.      Mouth/Throat:      Mouth: Mucous membranes are moist.      Pharynx: Oropharynx is clear.   Eyes:      Extraocular Movements: Extraocular movements intact.      Conjunctiva/sclera: Conjunctivae normal.      Pupils: Pupils are equal, round, and reactive to light.   Cardiovascular:      Rate and Rhythm: Normal rate and regular rhythm.      Pulses: Normal pulses.      Heart sounds: Normal heart sounds.   Pulmonary:      Effort: Pulmonary effort is normal.      Breath sounds: Normal breath sounds.   Abdominal:      General: Abdomen is flat. Bowel sounds are normal.      Palpations: Abdomen is soft.   Musculoskeletal:         General: Normal range of motion.      Cervical back: Normal range of motion and neck supple.   Skin:     General: Skin is warm and dry.   Neurological:      General: No focal deficit present.      Mental Status: He is alert and oriented to person, place, and time. Mental status is at baseline.   Psychiatric:         Mood and Affect: Mood normal.         Behavior: Behavior normal.         Thought Content: Thought content normal.         Judgment: Judgment normal.     Laboratory:  The pertinent laboratory results were reviewed and discussed with the patient.    Lab Results   Component Value Date    WBC 8.1 04/02/2024    HCT 37.6 (L) 04/02/2024    HGB 12.5 (L) 04/02/2024     (L) 04/02/2024    ANC 5.04 02/27/2024    K 4.0 04/02/2024    CALCIUM 9.9 04/02/2024     04/02/2024    MG 1.86 04/02/2024    ALT 15 04/02/2024    AST 16 04/02/2024    BUN 13 04/02/2024    CREATININE 0.75 04/02/2024    PHOS 2.1 (L) 02/22/2024    KAPPA 0.44 04/02/2024    LAMBDA <0.17 (L) 04/02/2024    KAPLS  04/02/2024      Comment:      One or more  analytes used in this calculation   is outside of the analytical measurement range.  Calculation cannot be performed.    SPEP Aberrant band detected. See immunofixation. 04/02/2024    IEPIN  04/02/2024 4/2/24  Known monoclonal IgG kappa in the gamma region at 0.3 g/dL. Last detected on 1/25/24 at 0.9 g/dL.     (L) 04/02/2024    IGM <5 (L) 04/02/2024    IGA <7 (L) 04/02/2024      Note: for a comprehensive list of the patient's lab results, access the Results Review activity.    ASSESSMENT and PLAN:    Plan:    Multiple Myeloma: IgG kappa. ISS stage II  - Bone marrow 30-40% kappa restricted plasma cells(1/27/23)  - VRd started in 3/2023 then switched to D-Kd in late May 23. tolerating chemo well.  - Partial response after ~ 8 months Rx  - Bone marrow 11/9/23  5-10% kappa restricted plasma cells.  - s/p autoSCT  on 2/5/24,  T=0, 2/7/24  - IgG 460  - kappaLC 0.44 mg/dL  - M protein 0.9>0.3 g/dL  - cont to monitor   - consider lino for maintenance in few weeks    ID:  - Acyclovir 400mg BID   - Pentamidine monthly (plan for last dose today 5/2/24), d/t Bactrim rash    Peripheral neuropathy  - bilateral LE's : on Gabapentin and Cymbalta  - bilateral CTS-> s/p repair. still symptomatic    R Rotator Cuff Tear:  - Continued pain   - Needs surgery, will consider s/p auto    DVT:  - Ultrasound 3/28 showed bilateral non occlusive DVTs  - Started Eliquis 10mg BID x 1 week, will then decrease to 5mg BID  - tolerating well. No swelling today    RTC:  3 months    Ok-[place]ia Contreras MD

## 2024-05-02 NOTE — LETTER
May 2, 2024     Shannon Piper MD  [address], [address]  [place] [address]    Patient: David Ferreira   YOB: 1945   Date of Visit: 5/2/2024       Dear Dr. Shannon Piper MD:    Thank you for referring David Ferreira to me for evaluation. Below are my notes for this consultation.  If you have questions, please do not hesitate to call me. I look forward to following your patient along with you.       Sincerely,     Zheng Contreras MD      CC: No Recipients  ______________________________________________________________________________________    Patient ID: David Ferreira is a 78 y.o. male.  Referring Physician: No referring provider defined for this encounter.  Primary Care Provider: No Assigned PCP Generic Provider, MD  Date of Service:  5/2/2024    Chief Complaint: IgG Kappa Multiple Myeloma      Interval History:      Patient is with newly diagnosed Multiple myeloma IgG kappa. He is here for further discussion re: auto SCT as part of consolidation therapy for Multiple Myeloma.     Recent BW showed elevated protein-> further work up c/w Multiple Myeloma. Bone marrow Bx(1/27/23) 30-40% kappa restricted plasma cells FISH without high risk features M protein 3.8 g/dL  B2M 3.8 IgG 6072 free KLC 1944, LLC 3.3  Kappa/Lambda ratio 589   alb 4.0  CT/PET 1/30/23 showed no abnormal lesions.  Patient denies recent weight loss, night sweats, poor appetites. He c/o right shoulder pain 2/2 rotator cuff tear.  He also c/o numbing/tingling both hands 2/2 CTS and bilateral LE pain for which he takes Gabapentin daily.    Treatment:    Velcade, Revlimid, Dexamethasone  Started in 3/2023.     Daratumumab, Kyprolis, Dexamethasone:  Therapy switched in 5/2023 due to slow response to therapy  Achieved [place]     Autologous SCT:  Prep w/ Sonja 140  T=0. 2/7/24    PMHx: Peripheral neuropathy bilateral LE's 2016 on gabapentin   Rotator cuff tear right.  ~6 month ago     PSHx: CTS  repair bilateral: right ~ 2 years ago, left 3-4 months ago     Fhx: mother with colon cancer at 74  Father with CAD at 59       Oncology History   Multiple myeloma (Multi)   11/5/2023 Initial Diagnosis    Multiple Myeloma: IgG kappa. ISS stage II  - Bone marrow 30-40% kappa restricted plasma cells  - Fish negative   - CT/PET: negative  - b2M 3.8   - normal Ca++, Cr, LDH, alb  - VRd started in 3/2023 then switched to D-Kd in late May 23. tolerating chemo well.  - M protein 3.8>3.1>3.0>2.9>2.8>2.1>1.7>1.3  - IgG 6072>>4098>2480>>2211>1700>1480  - Kappa LC 1944 mg/L>>920>685>435  - K/L ratio 589>438>>>457  - Partial response after ~ 8 months Rx  - Bone marrow 11/9/23  5-10% kappa restricted plasma cells.     2/7/2024 -  Bone Marrow Transplant    Collected on 1/29/24, only required 1 day.     Admitted on 2/5/24,  T=0, 2/7/24 for autologous transplant prepped with Melphalan 140 mg/m2.     Hospitalization complicated by:  1. chemotherapy-induced nausea and vomiting, managed with IV and oral anti-emetics, stable and tolerating PO at discharge  2. Diverticulitis, initially presented w/ abdominal pain, and demonstrated on CT AP. He was culture negative but treated w/ 7 days of IV zosyn and sent home on 7 days of flagy and levofloxacin.   3. Deconditioning, managed w/ PT while admitted, ordered home PT on discharge.  4. RUE edema 2/2 fluid overload and immobility of th R extremity due to a rotator cuff injury. Duplex sonography ordered while inpatient but cancelled at patient request due to delay in discharge. Patient and family reporting considerable improvement in RUE edema. Plan for outpatient US pending further evaluation by primary oncologist.          SUBJECTIVE:  History of Present Illness:  David presents to clinic 5/2/24 for a follow up visit with his wife and son.     Overall he is doing well.    Had leg pain one month ago, found to have bilateral DVTs, taking Eliquis without issues. No pain.     No other  issues.      Review of Systems   Constitutional: Negative.    HENT: Negative.     Eyes: Negative.    Respiratory: Negative.     Cardiovascular: Negative.    Gastrointestinal: Negative.    Endocrine: Negative.    Genitourinary: Negative.    Musculoskeletal: Negative.    Skin: Negative.    Allergic/Immunologic: Positive for immunocompromised state.   Neurological:  Positive for numbness.   Hematological: Negative.    Psychiatric/Behavioral: Negative.       OBJECTIVE:  KPS: Karnofsky Score: 80 - Normal activity with effort; some signs or symptoms of disease   VS:  /82   Pulse 73   Temp 36.1 °C (97 °F)   Resp 17   Wt 73.2 kg (161 lb 6 oz)   SpO2 100%   BMI 26.85 kg/m²   BSA: 1.83 meters squared    Physical Exam  Constitutional:       Appearance: Normal appearance. He is normal weight.   HENT:      Head: Normocephalic and atraumatic.      Nose: Nose normal.      Mouth/Throat:      Mouth: Mucous membranes are moist.      Pharynx: Oropharynx is clear.   Eyes:      Extraocular Movements: Extraocular movements intact.      Conjunctiva/sclera: Conjunctivae normal.      Pupils: Pupils are equal, round, and reactive to light.   Cardiovascular:      Rate and Rhythm: Normal rate and regular rhythm.      Pulses: Normal pulses.      Heart sounds: Normal heart sounds.   Pulmonary:      Effort: Pulmonary effort is normal.      Breath sounds: Normal breath sounds.   Abdominal:      General: Abdomen is flat. Bowel sounds are normal.      Palpations: Abdomen is soft.   Musculoskeletal:         General: Normal range of motion.      Cervical back: Normal range of motion and neck supple.   Skin:     General: Skin is warm and dry.   Neurological:      General: No focal deficit present.      Mental Status: He is alert and oriented to person, place, and time. Mental status is at baseline.   Psychiatric:         Mood and Affect: Mood normal.         Behavior: Behavior normal.         Thought Content: Thought content normal.          Judgment: Judgment normal.     Laboratory:  The pertinent laboratory results were reviewed and discussed with the patient.    Lab Results   Component Value Date    WBC 8.1 04/02/2024    HCT 37.6 (L) 04/02/2024    HGB 12.5 (L) 04/02/2024     (L) 04/02/2024    ANC 5.04 02/27/2024    K 4.0 04/02/2024    CALCIUM 9.9 04/02/2024     04/02/2024    MG 1.86 04/02/2024    ALT 15 04/02/2024    AST 16 04/02/2024    BUN 13 04/02/2024    CREATININE 0.75 04/02/2024    PHOS 2.1 (L) 02/22/2024    KAPPA 0.44 04/02/2024    LAMBDA <0.17 (L) 04/02/2024    KAPLS  04/02/2024      Comment:      One or more analytes used in this calculation   is outside of the analytical measurement range.  Calculation cannot be performed.    SPEP Aberrant band detected. See immunofixation. 04/02/2024    IEPIN  04/02/2024 4/2/24  Known monoclonal IgG kappa in the gamma region at 0.3 g/dL. Last detected on 1/25/24 at 0.9 g/dL.     (L) 04/02/2024    IGM <5 (L) 04/02/2024    IGA <7 (L) 04/02/2024      Note: for a comprehensive list of the patient's lab results, access the Results Review activity.    ASSESSMENT and PLAN:    Plan:    Multiple Myeloma: IgG kappa. ISS stage II  - Bone marrow 30-40% kappa restricted plasma cells(1/27/23)  - VRd started in 3/2023 then switched to D-Kd in late May 23. tolerating chemo well.  - Partial response after ~ 8 months Rx  - Bone marrow 11/9/23  5-10% kappa restricted plasma cells.  - s/p autoSCT  on 2/5/24,  T=0, 2/7/24  - IgG 460  - kappaLC 0.44 mg/dL  - M protein 0.9>0.3 g/dL  - cont to monitor   - consider lino for maintenance in few weeks    ID:  - Acyclovir 400mg BID   - Pentamidine monthly (plan for last dose today 5/2/24), d/t Bactrim rash    Peripheral neuropathy  - bilateral LE's : on Gabapentin and Cymbalta  - bilateral CTS-> s/p repair. still symptomatic    R Rotator Cuff Tear:  - Continued pain   - Needs surgery, will consider s/p auto    DVT:  - Ultrasound 3/28 showed bilateral non  occlusive DVTs  - Started Eliquis 10mg BID x 1 week, will then decrease to 5mg BID  - tolerating well. No swelling today    RTC:  3 months    Zheng Contreras MD

## 2024-05-02 NOTE — PROGRESS NOTES
Pt here with wife and daughter for fuv with Dr. Contreras today. Pt denies N/V/D. Stated he does have sciatic nerve pain at times, but takes Cymbalta and states it helps with the pain. No other complaints today.

## 2024-05-30 ENCOUNTER — APPOINTMENT (OUTPATIENT)
Dept: HEMATOLOGY/ONCOLOGY | Facility: HOSPITAL | Age: 79
End: 2024-05-30
Payer: MEDICARE

## 2024-06-07 ENCOUNTER — NURSE TRIAGE (OUTPATIENT)
Dept: HEMATOLOGY/ONCOLOGY | Facility: HOSPITAL | Age: 79
End: 2024-06-07
Payer: MEDICARE

## 2024-06-07 NOTE — TELEPHONE ENCOUNTER
Per Vishal Harrell NP it is ok to take over the counter that he listed.    I spoke to patient and he verbalizes understanding via teach back method.

## 2024-06-07 NOTE — TELEPHONE ENCOUNTER
"Patient states he is having coughing and sneezing.  He is not bringing anything up.    He does not typically have allergies but that is what it feels like      Additional Information   Commented on: What was the highest temperature you've had in the past 24 hours?     He has not taken temp but does not feel like he has one    Protocols used: Fever/Chills/Infection    He does not feel \"sick\"    No chest pain, no SOB, no GI symptoms.  Eating and drinking well.    He is looking for a recommendation on over the counter allergy meds if that is ok to take?  He has some daytime cold and flu med that he is wondering if he can take?  He states it is ok to leave a message.      Message sent.  "

## 2024-06-18 ENCOUNTER — NURSE TRIAGE (OUTPATIENT)
Dept: ADMISSION | Facility: HOSPITAL | Age: 79
End: 2024-06-18
Payer: MEDICARE

## 2024-06-18 DIAGNOSIS — D84.9 IMMUNOCOMPROMISED STATE (MULTI): Primary | ICD-10-CM

## 2024-06-18 RX ORDER — AZITHROMYCIN 250 MG/1
250 TABLET, FILM COATED ORAL DAILY
Qty: 6 TABLET | Refills: 0 | Status: SHIPPED | OUTPATIENT
Start: 2024-06-18 | End: 2024-06-23

## 2024-06-18 NOTE — TELEPHONE ENCOUNTER
Pt called reporting 4-5 days ago he developed a dry cough/nasal drainage. He said once in a while he will cough something up and the mucous is clear. He has been taking OTC cold/flu medicine with a little relief (he said he spoke to Vishal the other day about this). He said he is getting a little better, but still doesn't feel great. He wants to know if there is anything else we recommend him trying that would work better. No chest pain, fever, SOB. Message sent to the team.

## 2024-06-24 NOTE — TELEPHONE ENCOUNTER
Pt states that symptoms have improved significantly since taking the zpack that was prescribed.   Pt states he still has a slight productive cough (clear mucous), no fever, body aches, chills.     Pt is requesting additional course of antibiotics to help clear remaining symptoms.     Last FUV 5/2 with next FUV 8/6.

## 2024-06-24 NOTE — TELEPHONE ENCOUNTER
Per MARTHA Harrell CNP- pt does not need another course of antibiotics - pt should use OTC cough medication to manage remaining symptoms.   Pt updated , in agreement with plan- will call with fever, new/worsening symptoms.

## 2024-06-27 ENCOUNTER — APPOINTMENT (OUTPATIENT)
Dept: HEMATOLOGY/ONCOLOGY | Facility: HOSPITAL | Age: 79
End: 2024-06-27
Payer: MEDICARE

## 2024-06-27 DIAGNOSIS — C90.00 KAPPA LIGHT CHAIN MYELOMA (MULTI): ICD-10-CM

## 2024-06-27 RX ORDER — DULOXETIN HYDROCHLORIDE 30 MG/1
30 CAPSULE, DELAYED RELEASE ORAL NIGHTLY
Qty: 90 CAPSULE | Refills: 0 | Status: SHIPPED | OUTPATIENT
Start: 2024-06-27 | End: 2024-09-25

## 2024-07-01 DIAGNOSIS — C90.00 KAPPA LIGHT CHAIN MYELOMA (MULTI): ICD-10-CM

## 2024-07-01 DIAGNOSIS — I82.4Z3 DVT, LOWER EXTREMITY, DISTAL, ACUTE, BILATERAL (MULTI): ICD-10-CM

## 2024-07-01 NOTE — TELEPHONE ENCOUNTER
[facility] pharmacy requesting refill on Eliquis 5 mg 1 tablet by mouth twice daily.  Refill pended to Vishal Harrell.

## 2024-07-02 NOTE — TELEPHONE ENCOUNTER
apixaban (Eliquis) 5 mg tablet        Sig: Take 1 tablet (5 mg) by mouth 2 times a day.        Sent to pharmacy as: apixaban 5 mg tablet (Eliquis)        Class: Normal        Route: oral        E-Prescribing Status: Receipt confirmed by pharmacy (7/1/2024  2:40 PM EDT)

## 2024-07-03 ENCOUNTER — TELEPHONE (OUTPATIENT)
Dept: ADMISSION | Facility: HOSPITAL | Age: 79
End: 2024-07-03
Payer: MEDICARE

## 2024-07-03 ENCOUNTER — ONCOLOGY MEDICATION OUTREACH (OUTPATIENT)
Dept: HEMATOLOGY/ONCOLOGY | Facility: HOSPITAL | Age: 79
End: 2024-07-03
Payer: MEDICARE

## 2024-07-03 NOTE — PROGRESS NOTES
ONCOLOGY CLINICAL PHARMACY NOTE     Subjective  David Ferreira is a 78 y.o. male with MM, here for refill support.        Treatment history  Treatment Details   Treatment goal [No plan goal]   Plan Name Filgrastim and Plerixafor - Autologous Stem Cell Mobilization   Status Inactive   Start Date 1/26/2024   End Date    Provider Zheng Contreras MD   Chemotherapy [No matching medication found in this treatment plan]     Treatment Details   Treatment goal [No plan goal]   Plan Name Filgrastim and Plerixafor - Autologous Stem Cell Mobilization   Status Inactive   Start Date 1/26/2024   End Date 1/30/2024   Provider Zheng Contreras MD   Chemotherapy plerixafor (Mozobil) injection 24 mg, 24 mg, subcutaneous, Once, 1 of 1 cycle  Administration: 24 mg (1/29/2024)    filgrastim-sndz (Zarxio) 780 mcg syringe, 10 mcg/kg = 780 mcg, subcutaneous, Once, 1 of 1 cycle  Administration: 780 mcg (1/26/2024), 780 mcg (1/27/2024), 780 mcg (1/28/2024), 780 mcg (1/29/2024), 780 mcg (1/30/2024)       Treatment Details   Treatment goal [No plan goal]   Plan Name (CELL - AUTO) Melphalan & Peripheral Blood Stem Cell Transplant   Status Inactive   Start Date 2/6/2024   End Date 2/7/2024   Provider Zheng Contreras MD   Chemotherapy fosaprepitant (Emend) 150 mg in sodium chloride 0.9% 250 mL IV, 150 mg, intravenous, Once, 1 of 1 cycle  Administration: 150 mg (2/6/2024)    melphalan (Alkeran) IV syringe 267.4 mg, 140 mg/m2 = 267.4 mg (70 % of original dose 200 mg/m2), intravenous, Once, 1 of 1 cycle  Dose modification: 140 mg/m2 (original dose 200 mg/m2, Cycle 1, Reason: Other (See Comments), Comment: Pt age over 65 years)  Administration: 267.4 mg (2/6/2024)    filgrastim-sndz (Zarxio) injection 480 mcg, 480 mcg, subcutaneous, Every 24 hours, 1 of 1 cycle  Administration: 480 mcg (2/12/2024), 480 mcg (2/13/2024), 480 mcg (2/14/2024), 480 mcg (2/15/2024), 480 mcg (2/16/2024), 480 mcg (2/17/2024), 480 mcg (2/18/2024), 480 mcg (2/19/2024),  480 mcg (2/20/2024)    hydrocortisone sod succ (PF) (Solu-CORTEF) injection 100 mg, 100 mg, intravenous, Once, 1 of 1 cycle  Administration: 100 mg (2/7/2024)    palonosetron (Aloxi) injection 250 mcg, 250 mcg, intravenous, Once, 1 of 1 cycle  Administration: 250 mcg (2/6/2024)       Treatment Details   Treatment goal [No plan goal]   Plan Name Venous Access Orders   Status Active   Start Date 2/1/2024   End Date Until discontinued   Provider Zheng Contreras MD   Chemotherapy [No matching medication found in this treatment plan]     Treatment Details   Treatment goal [No plan goal]   Plan Name Pentamidine, Every 28 Days - PJP Prophylaxis   Status Active   Start Date 3/5/2024   End Date Until discontinued   Provider Zheng Contreras MD   Chemotherapy Pentamidine (Pentam) 288 mg in dextrose 5 % in water (D5W) 250 mL IV, 4 mg/kg = 288 mg, intravenous, Once, 1 of 1 cycle  Administration: 288 mg (3/5/2024), 288 mg (4/2/2024), 294 mg (5/2/2024)          Objective  There were no vitals taken for this visit.  Lab Results   Component Value Date    WBC 8.1 05/02/2024    HGB 14.6 05/02/2024    HCT 43.9 05/02/2024    MCV 95 05/02/2024     05/02/2024      Lab Results   Component Value Date    GLUCOSE 101 (H) 05/02/2024    CALCIUM 10.0 05/02/2024     05/02/2024    K 4.1 05/02/2024    CO2 26 05/02/2024     05/02/2024    BUN 14 05/02/2024    CREATININE 0.77 05/02/2024     Lab Results   Component Value Date    ALT 12 05/02/2024    AST 14 05/02/2024    ALKPHOS 108 05/02/2024    BILITOT 0.3 05/02/2024       Allergies and Medications   Allergies   Allergen Reactions    Bactrim [Sulfamethoxazole-Trimethoprim] Rash       Current Outpatient Medications:     acyclovir (Zovirax) 400 mg tablet, Take 1 tablet (400 mg) by mouth every 12 hours., Disp: 60 tablet, Rfl: 2    apixaban (Eliquis) 5 mg tablet, Take 1 tablet (5 mg) by mouth 2 times a day., Disp: 180 tablet, Rfl: 0    capsaicin (Zostrix) 0.025 % cream, Apply  topically as needed at bedtime for mild pain (1 - 3)., Disp: 56.6 g, Rfl: 2    DULoxetine (Cymbalta) 30 mg DR capsule, Take 1 capsule (30 mg) by mouth once daily at bedtime. Do not crush or chew., Disp: 90 capsule, Rfl: 0    gabapentin (Neurontin) 300 mg capsule, Take 1 capsule (300 mg) by mouth once daily with breakfast AND 1 capsule (300 mg) once daily at noon. Take with meals AND 2 capsules (600 mg) once daily at bedtime., Disp: 120 capsule, Rfl: 2    melatonin 10 mg tablet, Take 1 tablet (10 mg) by mouth once daily at bedtime., Disp: 30 tablet, Rfl: 2    pantoprazole (ProtoNix) 40 mg EC tablet, Take 1 tablet (40 mg) by mouth once daily in the morning. Take before meals. Do not crush, chew, or split. Do not start before February 22, 2024., Disp: 30 tablet, Rfl: 11    Assessment and Plan  David Ferreira is a 78 y.o. male with MM, to be treated with OTCs.    Patient called on 7/3/2024 to inquire if he could restart taking OTC vitamins: Vitamin C & D, fiber gummies, zinc, and iron; no drug interactions noted. Per Dr. Contreras, patient ok to restart.     Spoke with patient on phone to relay information. Patient was understanding of and agreeable to plan was encouraged to reach out with any further questions or concerns.      Kishore Nichols, PharmD

## 2024-07-03 NOTE — TELEPHONE ENCOUNTER
David states team stopped his vitamins several months ago; he is asking if OK to resume taking. He would like to restart his Vitamin C, D, Iron, fiber gummy and Zinc.     He also wanted the team to be aware that he had labs drawn through Dr. Piper yesterday and her office plans to contact our office once those results are back.

## 2024-07-12 NOTE — ADDENDUM NOTE
Encounter addended by: Cindi Archibald MD on: 7/12/2024 2:13 PM   Actions taken: Delete clinical note

## 2024-08-06 ENCOUNTER — OFFICE VISIT (OUTPATIENT)
Dept: HEMATOLOGY/ONCOLOGY | Facility: HOSPITAL | Age: 79
End: 2024-08-06
Payer: MEDICARE

## 2024-08-06 ENCOUNTER — LAB (OUTPATIENT)
Dept: LAB | Facility: HOSPITAL | Age: 79
End: 2024-08-06
Payer: MEDICARE

## 2024-08-06 ENCOUNTER — APPOINTMENT (OUTPATIENT)
Dept: HEMATOLOGY/ONCOLOGY | Facility: HOSPITAL | Age: 79
End: 2024-08-06
Payer: MEDICARE

## 2024-08-06 VITALS
RESPIRATION RATE: 16 BRPM | SYSTOLIC BLOOD PRESSURE: 138 MMHG | DIASTOLIC BLOOD PRESSURE: 79 MMHG | HEART RATE: 76 BPM | OXYGEN SATURATION: 100 % | TEMPERATURE: 97 F | BODY MASS INDEX: 28.18 KG/M2 | WEIGHT: 169.31 LBS

## 2024-08-06 DIAGNOSIS — C90.00 MULTIPLE MYELOMA, REMISSION STATUS UNSPECIFIED (MULTI): ICD-10-CM

## 2024-08-06 DIAGNOSIS — C90.00 MULTIPLE MYELOMA NOT HAVING ACHIEVED REMISSION (MULTI): ICD-10-CM

## 2024-08-06 DIAGNOSIS — C90.00 KAPPA LIGHT CHAIN MYELOMA (MULTI): ICD-10-CM

## 2024-08-06 LAB
ALBUMIN SERPL BCP-MCNC: 4.5 G/DL (ref 3.4–5)
ALP SERPL-CCNC: 93 U/L (ref 33–136)
ALT SERPL W P-5'-P-CCNC: 17 U/L (ref 10–52)
ANION GAP SERPL CALC-SCNC: 12 MMOL/L (ref 10–20)
AST SERPL W P-5'-P-CCNC: 14 U/L (ref 9–39)
B2 MICROGLOB SERPL-MCNC: 2.5 MG/L (ref 0.7–2.2)
BASOPHILS # BLD AUTO: 0.02 X10*3/UL (ref 0–0.1)
BASOPHILS NFR BLD AUTO: 0.1 %
BILIRUB SERPL-MCNC: 0.8 MG/DL (ref 0–1.2)
BUN SERPL-MCNC: 20 MG/DL (ref 6–23)
CALCIUM SERPL-MCNC: 10.3 MG/DL (ref 8.6–10.3)
CHLORIDE SERPL-SCNC: 106 MMOL/L (ref 98–107)
CO2 SERPL-SCNC: 27 MMOL/L (ref 21–32)
CREAT SERPL-MCNC: 0.81 MG/DL (ref 0.5–1.3)
EGFRCR SERPLBLD CKD-EPI 2021: >90 ML/MIN/1.73M*2
EOSINOPHIL # BLD AUTO: 0.01 X10*3/UL (ref 0–0.4)
EOSINOPHIL NFR BLD AUTO: 0.1 %
ERYTHROCYTE [DISTWIDTH] IN BLOOD BY AUTOMATED COUNT: 14.4 % (ref 11.5–14.5)
GLUCOSE SERPL-MCNC: 118 MG/DL (ref 74–99)
HCT VFR BLD AUTO: 43 % (ref 41–52)
HGB BLD-MCNC: 14.6 G/DL (ref 13.5–17.5)
IGA SERPL-MCNC: <7 MG/DL (ref 70–400)
IGG SERPL-MCNC: 453 MG/DL (ref 700–1600)
IGM SERPL-MCNC: 9 MG/DL (ref 40–230)
IMM GRANULOCYTES # BLD AUTO: 0.09 X10*3/UL (ref 0–0.5)
IMM GRANULOCYTES NFR BLD AUTO: 0.6 % (ref 0–0.9)
LDH SERPL L TO P-CCNC: 119 U/L (ref 84–246)
LYMPHOCYTES # BLD AUTO: 1.57 X10*3/UL (ref 0.8–3)
LYMPHOCYTES NFR BLD AUTO: 10.1 %
MAGNESIUM SERPL-MCNC: 2.02 MG/DL (ref 1.6–2.4)
MCH RBC QN AUTO: 31.1 PG (ref 26–34)
MCHC RBC AUTO-ENTMCNC: 34 G/DL (ref 32–36)
MCV RBC AUTO: 92 FL (ref 80–100)
MONOCYTES # BLD AUTO: 0.86 X10*3/UL (ref 0.05–0.8)
MONOCYTES NFR BLD AUTO: 5.5 %
NEUTROPHILS # BLD AUTO: 13.05 X10*3/UL (ref 1.6–5.5)
NEUTROPHILS NFR BLD AUTO: 83.6 %
NRBC BLD-RTO: 0 /100 WBCS (ref 0–0)
PLATELET # BLD AUTO: 170 X10*3/UL (ref 150–450)
POTASSIUM SERPL-SCNC: 4.1 MMOL/L (ref 3.5–5.3)
PROT SERPL-MCNC: 6.3 G/DL (ref 6.4–8.2)
PROT SERPL-MCNC: 6.3 G/DL (ref 6.4–8.2)
RBC # BLD AUTO: 4.7 X10*6/UL (ref 4.5–5.9)
SODIUM SERPL-SCNC: 141 MMOL/L (ref 136–145)
URATE SERPL-MCNC: 6.4 MG/DL (ref 4–7.5)
WBC # BLD AUTO: 15.6 X10*3/UL (ref 4.4–11.3)

## 2024-08-06 PROCEDURE — 80053 COMPREHEN METABOLIC PANEL: CPT

## 2024-08-06 PROCEDURE — 86334 IMMUNOFIX E-PHORESIS SERUM: CPT

## 2024-08-06 PROCEDURE — 1159F MED LIST DOCD IN RCRD: CPT | Performed by: INTERNAL MEDICINE

## 2024-08-06 PROCEDURE — 84155 ASSAY OF PROTEIN SERUM: CPT

## 2024-08-06 PROCEDURE — 83735 ASSAY OF MAGNESIUM: CPT

## 2024-08-06 PROCEDURE — 99215 OFFICE O/P EST HI 40 MIN: CPT | Performed by: INTERNAL MEDICINE

## 2024-08-06 PROCEDURE — 84550 ASSAY OF BLOOD/URIC ACID: CPT

## 2024-08-06 PROCEDURE — 85025 COMPLETE CBC W/AUTO DIFF WBC: CPT

## 2024-08-06 PROCEDURE — 36415 COLL VENOUS BLD VENIPUNCTURE: CPT

## 2024-08-06 PROCEDURE — 82784 ASSAY IGA/IGD/IGG/IGM EACH: CPT

## 2024-08-06 PROCEDURE — 83615 LACTATE (LD) (LDH) ENZYME: CPT

## 2024-08-06 PROCEDURE — 83521 IG LIGHT CHAINS FREE EACH: CPT

## 2024-08-06 PROCEDURE — 1126F AMNT PAIN NOTED NONE PRSNT: CPT | Performed by: INTERNAL MEDICINE

## 2024-08-06 PROCEDURE — 82232 ASSAY OF BETA-2 PROTEIN: CPT

## 2024-08-06 RX ORDER — DULOXETIN HYDROCHLORIDE 30 MG/1
60 CAPSULE, DELAYED RELEASE ORAL NIGHTLY
Qty: 90 CAPSULE | Refills: 2 | Status: SHIPPED | OUTPATIENT
Start: 2024-08-06 | End: 2024-11-04

## 2024-08-06 ASSESSMENT — PAIN SCALES - GENERAL: PAINLEVEL: 0-NO PAIN

## 2024-08-06 ASSESSMENT — ENCOUNTER SYMPTOMS
HEMATOLOGIC/LYMPHATIC NEGATIVE: 1
GASTROINTESTINAL NEGATIVE: 1
RESPIRATORY NEGATIVE: 1
CONSTITUTIONAL NEGATIVE: 1
EYES NEGATIVE: 1
ENDOCRINE NEGATIVE: 1
CARDIOVASCULAR NEGATIVE: 1
PSYCHIATRIC NEGATIVE: 1
MUSCULOSKELETAL NEGATIVE: 1
NUMBNESS: 1

## 2024-08-06 NOTE — PROGRESS NOTES
Patient ID: David Ferreira is a 78 y.o. male.  Referring Physician: Zheng Contreras MD  [id number] [phone]  Primary Care Provider: No Assigned PCP Generic Provider, MD  Date of Service:  8/6/2024    Chief Complaint: IgG Kappa Multiple Myeloma      Interval History:      Patient is with newly diagnosed Multiple myeloma IgG kappa. He is here for further discussion re: auto SCT as part of consolidation therapy for Multiple Myeloma.     Recent BW showed elevated protein-> further work up c/w Multiple Myeloma. Bone marrow Bx(1/27/23) 30-40% kappa restricted plasma cells FISH without high risk features M protein 3.8 g/dL  B2M 3.8 IgG 6072 free KLC 1944, LLC 3.3  Kappa/Lambda ratio 589   alb 4.0  CT/PET 1/30/23 showed no abnormal lesions.  Patient denies recent weight loss, night sweats, poor appetites. He c/o right shoulder pain 2/2 rotator cuff tear.  He also c/o numbing/tingling both hands 2/2 CTS and bilateral LE pain for which he takes Gabapentin daily.    Treatment:    Velcade, Revlimid, Dexamethasone  Started in 3/2023.     Daratumumab, Kyprolis, Dexamethasone:  Therapy switched in 5/2023 due to slow response to therapy  Achieved ME     Autologous SCT:  Prep w/ Sonja 140  T=0. 2/7/24    PMHx: Peripheral neuropathy bilateral LE's 2016 on gabapentin   Rotator cuff tear right.  ~6 month ago     PSHx: CTS repair bilateral: right ~ 2 years ago, left 3-4 months ago     Fhx: mother with colon cancer at 74  Father with CAD at 59       Oncology History   Multiple myeloma (Multi)   1/27/2023 Initial Diagnosis    Multiple Myeloma: IgG kappa. ISS stage II  - Bone marrow 30-40% kappa restricted plasma cells  - Fish negative   - CT/PET: negative  - b2M 3.8   - M protein 3.8  - IgG 6072  - Kappa LC 1944 mg/L  - K/L ratio 589  - normal Ca++, Cr, LDH, alb  - VRd started in 3/2023 then switched to D-Kd in late May 23. tolerating chemo well.  - Partial response after ~ 8 months Rx  - Bone marrow 11/9/23   5-10% kappa restricted plasma cells.     2/7/2024 -  Bone Marrow Transplant    Collected on 1/29/24, only required 1 day.     Admitted on 2/5/24,  T=0, 2/7/24 for autologous transplant prepped with Melphalan 140 mg/m2.     Hospitalization complicated by:  1. chemotherapy-induced nausea and vomiting, managed with IV and oral anti-emetics, stable and tolerating PO at discharge  2. Diverticulitis, initially presented w/ abdominal pain, and demonstrated on CT AP. He was culture negative but treated w/ 7 days of IV zosyn and sent home on 7 days of flagy and levofloxacin.   3. Deconditioning, managed w/ PT while admitted, ordered home PT on discharge.  4. RUE edema 2/2 fluid overload and immobility of th R extremity due to a rotator cuff injury. Duplex sonography ordered while inpatient but cancelled at patient request due to delay in discharge. Patient and family reporting considerable improvement in RUE edema. Plan for outpatient US pending further evaluation by primary oncologist.          SUBJECTIVE:  History of Present Illness:  David presents to clinic 8/6/24 for a follow up visit with his daughter and son.     Overall he is doing well.    Had leg pain 3 months ago, found to have bilateral DVTs, taking Eliquis without issues. No pain.     No other issues.      Review of Systems   Constitutional: Negative.    HENT: Negative.     Eyes: Negative.    Respiratory: Negative.     Cardiovascular: Negative.    Gastrointestinal: Negative.    Endocrine: Negative.    Genitourinary: Negative.    Musculoskeletal: Negative.    Skin: Negative.    Allergic/Immunologic: Positive for immunocompromised state.   Neurological:  Positive for numbness.   Hematological: Negative.    Psychiatric/Behavioral: Negative.       OBJECTIVE:  KPS: Karnofsky Score: 80 - Normal activity with effort; some signs or symptoms of disease   VS:  /79   Pulse 76   Temp 36.1 °C (97 °F)   Resp 16   Wt 76.8 kg (169 lb 5 oz)   SpO2 100%   BMI 28.18  kg/m²   BSA: 1.88 meters squared    Physical Exam  Constitutional:       Appearance: Normal appearance. He is normal weight.   HENT:      Head: Normocephalic and atraumatic.      Nose: Nose normal.      Mouth/Throat:      Mouth: Mucous membranes are moist.      Pharynx: Oropharynx is clear.   Eyes:      Extraocular Movements: Extraocular movements intact.      Conjunctiva/sclera: Conjunctivae normal.      Pupils: Pupils are equal, round, and reactive to light.   Cardiovascular:      Rate and Rhythm: Normal rate and regular rhythm.      Pulses: Normal pulses.      Heart sounds: Normal heart sounds.   Pulmonary:      Effort: Pulmonary effort is normal.      Breath sounds: Normal breath sounds.   Abdominal:      General: Abdomen is flat. Bowel sounds are normal.      Palpations: Abdomen is soft.   Musculoskeletal:         General: Normal range of motion.      Cervical back: Normal range of motion and neck supple.   Skin:     General: Skin is warm and dry.   Neurological:      General: No focal deficit present.      Mental Status: He is alert and oriented to person, place, and time. Mental status is at baseline.   Psychiatric:         Mood and Affect: Mood normal.         Behavior: Behavior normal.         Thought Content: Thought content normal.         Judgment: Judgment normal.     Laboratory:  The pertinent laboratory results were reviewed and discussed with the patient.    Lab Results   Component Value Date    WBC 8.1 05/02/2024    HCT 43.9 05/02/2024    HGB 14.6 05/02/2024     05/02/2024    ANC 5.04 02/27/2024    K 4.1 05/02/2024    CALCIUM 10.0 05/02/2024     05/02/2024    MG 2.02 05/02/2024    ALT 12 05/02/2024    AST 14 05/02/2024    BUN 14 05/02/2024    CREATININE 0.77 05/02/2024    PHOS 2.1 (L) 02/22/2024    KAPPA 0.44 04/02/2024    LAMBDA <0.17 (L) 04/02/2024    KAPLS  04/02/2024      Comment:      One or more analytes used in this calculation   is outside of the analytical measurement  range.  Calculation cannot be performed.    SPEP Aberrant band detected. See immunofixation. 04/02/2024    IEPIN  04/02/2024 4/2/24  Known monoclonal IgG kappa in the gamma region at 0.3 g/dL. Last detected on 1/25/24 at 0.9 g/dL.     (L) 05/02/2024    IGM <5 (L) 04/02/2024    IGA <7 (L) 04/02/2024      Note: for a comprehensive list of the patient's lab results, access the Results Review activity.    ASSESSMENT and PLAN:    Plan:    Multiple Myeloma: IgG kappa. ISS stage II  - Bone marrow 30-40% kappa restricted plasma cells(1/27/23)  - VRd started in 3/2023 then switched to D-Kd in late May 23. tolerating chemo well.  - Partial response after ~ 8 months Rx  - Bone marrow 11/9/23  5-10% kappa restricted plasma cells.  - s/p autoSCT  T=0, 2/7/24 T+ 181  - IgG 460  - kappaLC 0.44 mg/dL  - M protein 0.9>0.3 g/dL  - started on lino for maintenance in 5/2024  ID:  - Acyclovir 400mg BID     Peripheral neuropathy  - bilateral LE's : on Gabapentin and Cymbalta(increased to 60 mg 8/6/24)  - bilateral CTS-> s/p repair. 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 No swelling today    RTC:  3 months    Ok-Virginia Contreras MD

## 2024-08-08 LAB
KAPPA LC SERPL-MCNC: 0.49 MG/DL (ref 0.33–1.94)
KAPPA LC/LAMBDA SER: 2.88 {RATIO} (ref 0.26–1.65)
LAMBDA LC SERPL-MCNC: 0.17 MG/DL (ref 0.57–2.63)

## 2024-08-09 LAB
ALBUMIN: 4.3 G/DL (ref 3.4–5)
ALPHA 1 GLOBULIN: 0.3 G/DL (ref 0.2–0.6)
ALPHA 2 GLOBULIN: 0.7 G/DL (ref 0.4–1.1)
BETA GLOBULIN: 0.6 G/DL (ref 0.5–1.2)
GAMMA GLOBULIN: 0.4 G/DL (ref 0.5–1.4)
IMMUNOFIXATION COMMENT: ABNORMAL
M-PROTEIN 1: 0.1 G/DL
M-PROTEIN 2: 0.1 G/DL
PATH REVIEW - SERUM IMMUNOFIXATION: ABNORMAL
PATH REVIEW-SERUM PROTEIN ELECTROPHORESIS: ABNORMAL
PROTEIN ELECTROPHORESIS COMMENT: ABNORMAL

## 2024-08-22 ENCOUNTER — APPOINTMENT (OUTPATIENT)
Dept: HEMATOLOGY/ONCOLOGY | Facility: HOSPITAL | Age: 79
End: 2024-08-22
Payer: MEDICARE

## 2024-09-16 ENCOUNTER — NURSE TRIAGE (OUTPATIENT)
Dept: ADMISSION | Facility: HOSPITAL | Age: 79
End: 2024-09-16
Payer: MEDICARE

## 2024-09-16 NOTE — TELEPHONE ENCOUNTER
David c/o neuropathy in his feet. This has been an ongoing issue though it now feels it is spreading up to his ankles.   He takes Gabapentin 300mg; he takes 300mg in the morning, 300mg in the afternoon and 600mg at bedtime. He also increased his Cymbalta from 30mg to 60mg about 6 weeks ago and has not noticed much of a difference.     Asking if there is anything else he can try.        Additional Information   Commented on: If yes to pain, on a scale of 0 to 10 (0 being no pain and 10 being the worst possible) how would you rate your pain?     It varies; sometimes it doesn't bother him and other times it's so severe he feels he cannot walk.   Commented on: What makes these problems worse?     Resting makes it worse per patient    Protocols used: Paresthesia/Peripheral Neuropathy

## 2024-09-17 DIAGNOSIS — C90.00 KAPPA LIGHT CHAIN MYELOMA (MULTI): ICD-10-CM

## 2024-09-17 DIAGNOSIS — I82.4Z3 DVT, LOWER EXTREMITY, DISTAL, ACUTE, BILATERAL (MULTI): ICD-10-CM

## 2024-09-19 NOTE — TELEPHONE ENCOUNTER
David calls back. Forgot to ask CNP if he could take Tylenol for a headache.    Secure chat sent to Sheryl Vega CNP. She replied that he may take Tylenol for a headache.    Message relayed to patient as above. Understanding verbalized with teach back. No further needs at this time.

## 2024-09-19 NOTE — TELEPHONE ENCOUNTER
Patient calling back. Just missed call from [facility].  Secure chat sent to KASI Vega to call patient back.

## 2024-09-24 ENCOUNTER — TELEPHONE (OUTPATIENT)
Dept: ADMISSION | Facility: HOSPITAL | Age: 79
End: 2024-09-24
Payer: MEDICARE

## 2024-10-10 DIAGNOSIS — C90.00 KAPPA LIGHT CHAIN MYELOMA (MULTI): ICD-10-CM

## 2024-10-10 RX ORDER — DULOXETIN HYDROCHLORIDE 30 MG/1
60 CAPSULE, DELAYED RELEASE ORAL NIGHTLY
Qty: 90 CAPSULE | Refills: 2 | Status: SHIPPED | OUTPATIENT
Start: 2024-10-10 | End: 2025-01-08

## 2024-10-17 ENCOUNTER — APPOINTMENT (OUTPATIENT)
Dept: HEMATOLOGY/ONCOLOGY | Facility: HOSPITAL | Age: 79
End: 2024-10-17
Payer: MEDICARE

## 2024-11-05 ENCOUNTER — LAB (OUTPATIENT)
Dept: LAB | Facility: HOSPITAL | Age: 79
End: 2024-11-05
Payer: MEDICARE

## 2024-11-05 ENCOUNTER — OFFICE VISIT (OUTPATIENT)
Dept: HEMATOLOGY/ONCOLOGY | Facility: HOSPITAL | Age: 79
End: 2024-11-05
Payer: MEDICARE

## 2024-11-05 VITALS
SYSTOLIC BLOOD PRESSURE: 133 MMHG | BODY MASS INDEX: 29.2 KG/M2 | WEIGHT: 175.49 LBS | TEMPERATURE: 97.7 F | HEART RATE: 89 BPM | DIASTOLIC BLOOD PRESSURE: 81 MMHG | OXYGEN SATURATION: 100 % | RESPIRATION RATE: 17 BRPM

## 2024-11-05 DIAGNOSIS — C90.00 MULTIPLE MYELOMA, REMISSION STATUS UNSPECIFIED (MULTI): ICD-10-CM

## 2024-11-05 DIAGNOSIS — C90.00 MULTIPLE MYELOMA NOT HAVING ACHIEVED REMISSION (MULTI): Primary | ICD-10-CM

## 2024-11-05 DIAGNOSIS — C90.00 MULTIPLE MYELOMA NOT HAVING ACHIEVED REMISSION (MULTI): ICD-10-CM

## 2024-11-05 LAB
ALBUMIN SERPL BCP-MCNC: 4.4 G/DL (ref 3.4–5)
ALP SERPL-CCNC: 92 U/L (ref 33–136)
ALT SERPL W P-5'-P-CCNC: 18 U/L (ref 10–52)
ANION GAP SERPL CALC-SCNC: 11 MMOL/L (ref 10–20)
AST SERPL W P-5'-P-CCNC: 15 U/L (ref 9–39)
B2 MICROGLOB SERPL-MCNC: 2.8 MG/L (ref 0.7–2.2)
BASOPHILS # BLD AUTO: 0.04 X10*3/UL (ref 0–0.1)
BASOPHILS NFR BLD AUTO: 0.4 %
BILIRUB SERPL-MCNC: 0.5 MG/DL (ref 0–1.2)
BUN SERPL-MCNC: 19 MG/DL (ref 6–23)
CALCIUM SERPL-MCNC: 10.7 MG/DL (ref 8.6–10.3)
CHLORIDE SERPL-SCNC: 106 MMOL/L (ref 98–107)
CO2 SERPL-SCNC: 28 MMOL/L (ref 21–32)
CREAT SERPL-MCNC: 0.9 MG/DL (ref 0.5–1.3)
EGFRCR SERPLBLD CKD-EPI 2021: 87 ML/MIN/1.73M*2
EOSINOPHIL # BLD AUTO: 0.26 X10*3/UL (ref 0–0.4)
EOSINOPHIL NFR BLD AUTO: 2.9 %
ERYTHROCYTE [DISTWIDTH] IN BLOOD BY AUTOMATED COUNT: 14.5 % (ref 11.5–14.5)
GLUCOSE SERPL-MCNC: 108 MG/DL (ref 74–99)
HCT VFR BLD AUTO: 46 % (ref 41–52)
HGB BLD-MCNC: 15.7 G/DL (ref 13.5–17.5)
IGA SERPL-MCNC: <7 MG/DL (ref 70–400)
IGG SERPL-MCNC: 315 MG/DL (ref 700–1600)
IGM SERPL-MCNC: 6 MG/DL (ref 40–230)
IMM GRANULOCYTES # BLD AUTO: 0.12 X10*3/UL (ref 0–0.5)
IMM GRANULOCYTES NFR BLD AUTO: 1.3 % (ref 0–0.9)
KAPPA LC SERPL-MCNC: 0.56 MG/DL (ref 0.33–1.94)
KAPPA LC/LAMBDA SER: 3.29 {RATIO} (ref 0.26–1.65)
LAMBDA LC SERPL-MCNC: 0.17 MG/DL (ref 0.57–2.63)
LDH SERPL L TO P-CCNC: 131 U/L (ref 84–246)
LYMPHOCYTES # BLD AUTO: 1.71 X10*3/UL (ref 0.8–3)
LYMPHOCYTES NFR BLD AUTO: 19.1 %
MAGNESIUM SERPL-MCNC: 2.06 MG/DL (ref 1.6–2.4)
MCH RBC QN AUTO: 32 PG (ref 26–34)
MCHC RBC AUTO-ENTMCNC: 34.1 G/DL (ref 32–36)
MCV RBC AUTO: 94 FL (ref 80–100)
MONOCYTES # BLD AUTO: 0.65 X10*3/UL (ref 0.05–0.8)
MONOCYTES NFR BLD AUTO: 7.3 %
NEUTROPHILS # BLD AUTO: 6.18 X10*3/UL (ref 1.6–5.5)
NEUTROPHILS NFR BLD AUTO: 69 %
NRBC BLD-RTO: 0 /100 WBCS (ref 0–0)
PLATELET # BLD AUTO: 160 X10*3/UL (ref 150–450)
POTASSIUM SERPL-SCNC: 4.5 MMOL/L (ref 3.5–5.3)
PROT SERPL-MCNC: 6.4 G/DL (ref 6.4–8.2)
PROT SERPL-MCNC: 6.4 G/DL (ref 6.4–8.2)
RBC # BLD AUTO: 4.9 X10*6/UL (ref 4.5–5.9)
SODIUM SERPL-SCNC: 140 MMOL/L (ref 136–145)
URATE SERPL-MCNC: 6.4 MG/DL (ref 4–7.5)
WBC # BLD AUTO: 9 X10*3/UL (ref 4.4–11.3)

## 2024-11-05 PROCEDURE — 99215 OFFICE O/P EST HI 40 MIN: CPT | Performed by: INTERNAL MEDICINE

## 2024-11-05 PROCEDURE — 84075 ASSAY ALKALINE PHOSPHATASE: CPT

## 2024-11-05 PROCEDURE — 84155 ASSAY OF PROTEIN SERUM: CPT | Mod: 59

## 2024-11-05 PROCEDURE — 83521 IG LIGHT CHAINS FREE EACH: CPT

## 2024-11-05 PROCEDURE — 85025 COMPLETE CBC W/AUTO DIFF WBC: CPT

## 2024-11-05 PROCEDURE — 82784 ASSAY IGA/IGD/IGG/IGM EACH: CPT

## 2024-11-05 PROCEDURE — 84550 ASSAY OF BLOOD/URIC ACID: CPT

## 2024-11-05 PROCEDURE — 83735 ASSAY OF MAGNESIUM: CPT

## 2024-11-05 PROCEDURE — 83615 LACTATE (LD) (LDH) ENZYME: CPT

## 2024-11-05 PROCEDURE — 1126F AMNT PAIN NOTED NONE PRSNT: CPT | Performed by: INTERNAL MEDICINE

## 2024-11-05 PROCEDURE — 82232 ASSAY OF BETA-2 PROTEIN: CPT

## 2024-11-05 PROCEDURE — 86334 IMMUNOFIX E-PHORESIS SERUM: CPT

## 2024-11-05 PROCEDURE — 1159F MED LIST DOCD IN RCRD: CPT | Performed by: INTERNAL MEDICINE

## 2024-11-05 PROCEDURE — 36415 COLL VENOUS BLD VENIPUNCTURE: CPT

## 2024-11-05 ASSESSMENT — ENCOUNTER SYMPTOMS
RESPIRATORY NEGATIVE: 1
CARDIOVASCULAR NEGATIVE: 1
GASTROINTESTINAL NEGATIVE: 1
CONSTITUTIONAL NEGATIVE: 1
EYES NEGATIVE: 1
NUMBNESS: 1
PSYCHIATRIC NEGATIVE: 1
ENDOCRINE NEGATIVE: 1
MUSCULOSKELETAL NEGATIVE: 1
HEMATOLOGIC/LYMPHATIC NEGATIVE: 1

## 2024-11-05 ASSESSMENT — PAIN SCALES - GENERAL: PAINLEVEL_OUTOF10: 0-NO PAIN

## 2024-11-05 NOTE — PROGRESS NOTES
Patient ID: David Ferreira is a 79 y.o. male.  Referring Physician: No referring provider defined for this encounter.  Primary Care Provider: No Assigned PCP Generic Provider, MD  Date of Service:  11/5/2024    Chief Complaint: IgG Kappa Multiple Myeloma      Interval History:      Patient is with newly diagnosed Multiple myeloma IgG kappa. He is here for further discussion re: auto SCT as part of consolidation therapy for Multiple Myeloma.     Recent BW showed elevated protein-> further work up c/w Multiple Myeloma. Bone marrow Bx(1/27/23) 30-40% kappa restricted plasma cells FISH without high risk features M protein 3.8 g/dL  B2M 3.8 IgG 6072 free KLC 1944, LLC 3.3  Kappa/Lambda ratio 589   alb 4.0  CT/PET 1/30/23 showed no abnormal lesions.  Patient denies recent weight loss, night sweats, poor appetites. He c/o right shoulder pain 2/2 rotator cuff tear.  He also c/o numbing/tingling both hands 2/2 CTS and bilateral LE pain for which he takes Gabapentin daily.    Treatment:    Velcade, Revlimid, Dexamethasone  Started in 3/2023.     Daratumumab, Kyprolis, Dexamethasone:  Therapy switched in 5/2023 due to slow response to therapy  Achieved DC     Autologous SCT:  Prep w/ Sonja 140  T=0. 2/7/24    PMHx: Peripheral neuropathy bilateral LE's 2016 on gabapentin   Rotator cuff tear right.  ~6 month ago     PSHx: CTS repair bilateral: right ~ 2 years ago, left 3-4 months ago     Fhx: mother with colon cancer at 74  Father with CAD at 59       Oncology History   Multiple myeloma   1/27/2023 Initial Diagnosis    Multiple Myeloma: IgG kappa. ISS stage II  - Bone marrow 30-40% kappa restricted plasma cells  - Fish negative   - CT/PET: negative  - b2M 3.8   - M protein 3.8  - IgG 6072  - Kappa LC 1944 mg/L  - K/L ratio 589  - normal Ca++, Cr, LDH, alb  - VRd started in 3/2023 then switched to D-Kd in late May 23. tolerating chemo well.  - Partial response after ~ 8 months Rx  - Bone marrow 11/9/23  5-10% kappa  restricted plasma cells.     2/7/2024 -  Bone Marrow Transplant    Collected on 1/29/24, only required 1 day.     Admitted on 2/5/24,  T=0, 2/7/24 for autologous transplant prepped with Melphalan 140 mg/m2.     Hospitalization complicated by:  1. chemotherapy-induced nausea and vomiting, managed with IV and oral anti-emetics, stable and tolerating PO at discharge  2. Diverticulitis, initially presented w/ abdominal pain, and demonstrated on CT AP. He was culture negative but treated w/ 7 days of IV zosyn and sent home on 7 days of flagy and levofloxacin.   3. Deconditioning, managed w/ PT while admitted, ordered home PT on discharge.  4. RUE edema 2/2 fluid overload and immobility of th R extremity due to a rotator cuff injury. Duplex sonography ordered while inpatient but cancelled at patient request due to delay in discharge. Patient and family reporting considerable improvement in RUE edema. Plan for outpatient US pending further evaluation by primary oncologist.          SUBJECTIVE:  History of Present Illness:  8/6/24 : routine follow up visit with his daughter and son. Developed DVT 3 months ago and started on Eliquis  11/5/24: doing well. Wife needs 24 hr care 2/2 worsening dementia. Still has hand/leg pain from neuropathy        Review of Systems   Constitutional: Negative.    HENT: Negative.     Eyes: Negative.    Respiratory: Negative.     Cardiovascular: Negative.    Gastrointestinal: Negative.    Endocrine: Negative.    Genitourinary: Negative.    Musculoskeletal: Negative.    Skin: Negative.    Allergic/Immunologic: Positive for immunocompromised state.   Neurological:  Positive for numbness.   Hematological: Negative.    Psychiatric/Behavioral: Negative.     All other systems reviewed and are negative.    OBJECTIVE:  KPS: Karnofsky Score: 80 - Normal activity with effort; some signs or symptoms of disease   VS:  /81   Pulse 89   Temp 36.5 °C (97.7 °F)   Resp 17   Wt 79.6 kg (175 lb 7.8 oz)    SpO2 100%   BMI 29.20 kg/m²   BSA: 1.91 meters squared    Physical Exam  Constitutional:       Appearance: Normal appearance. He is normal weight.   HENT:      Head: Normocephalic and atraumatic.      Nose: Nose normal.      Mouth/Throat:      Mouth: Mucous membranes are moist.      Pharynx: Oropharynx is clear.   Eyes:      Extraocular Movements: Extraocular movements intact.      Conjunctiva/sclera: Conjunctivae normal.      Pupils: Pupils are equal, round, and reactive to light.   Cardiovascular:      Rate and Rhythm: Normal rate and regular rhythm.      Pulses: Normal pulses.      Heart sounds: Normal heart sounds.   Pulmonary:      Effort: Pulmonary effort is normal.      Breath sounds: Normal breath sounds.   Abdominal:      General: Abdomen is flat. Bowel sounds are normal.      Palpations: Abdomen is soft.   Musculoskeletal:         General: Normal range of motion.      Cervical back: Normal range of motion and neck supple.   Skin:     General: Skin is warm and dry.   Neurological:      General: No focal deficit present.      Mental Status: He is alert and oriented to person, place, and time. Mental status is at baseline.   Psychiatric:         Mood and Affect: Mood normal.         Behavior: Behavior normal.         Thought Content: Thought content normal.         Judgment: Judgment normal.     Laboratory:  The pertinent laboratory results were reviewed and discussed with the patient.    Lab Results   Component Value Date    WBC 15.6 (H) 08/06/2024    HCT 43.0 08/06/2024    HGB 14.6 08/06/2024     08/06/2024    ANC 5.04 02/27/2024    K 4.1 08/06/2024    CALCIUM 10.3 08/06/2024     08/06/2024    MG 2.02 08/06/2024    ALT 17 08/06/2024    AST 14 08/06/2024    BUN 20 08/06/2024    CREATININE 0.81 08/06/2024    PHOS 2.1 (L) 02/22/2024    KAPPA 0.49 08/06/2024    LAMBDA 0.17 (L) 08/06/2024    KAPLS 2.88 (H) 08/06/2024    SPEP Aberrant band detected. See immunofixation. 08/06/2024    IEPIN   08/06/2024 8/6/24 Known monoclonal IgG kappa in the gamma region at 0.1 g/dL. Last detected on 4/2/24 at 0.3 g/dL. New monoclonal IgG kappa  in the gamma region at 0.1 g/dL.     (L) 08/06/2024    IGM 9 (L) 08/06/2024    IGA <7 (L) 08/06/2024      Note: for a comprehensive list of the patient's lab results, access the Results Review activity.    ASSESSMENT and PLAN:    Plan:    Multiple Myeloma: IgG kappa. ISS stage II  - Bone marrow 30-40% kappa restricted plasma cells(1/27/23)  - VRd started in 3/2023 then switched to D-Kd in late May 23. tolerating chemo well.  - Partial response after ~ 8 months Rx  - Bone marrow 11/9/23  5-10% kappa restricted plasma cells.  - s/p autoSCT  T=0, 2/7/24 T+ 270  - on lino maintenance therapy started on  5/2024  - IgG 460>315  - kappaLC 0.44 mg/dL>0.55  - M protein 0.9>0.3 g/dL>>0.1    ID:  - Acyclovir 400mg BID     Peripheral neuropathy  - bilateral LE's : on Gabapentin and Cymbalta(increased to 60 mg 8/6/24)  - bilateral CTS-> s/p repair. 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 No swelling today    RTC:  4 months    Ok-Virginia Contreras MD

## 2024-11-08 LAB
ALBUMIN: 4.4 G/DL (ref 3.4–5)
ALPHA 1 GLOBULIN: 0.3 G/DL (ref 0.2–0.6)
ALPHA 2 GLOBULIN: 0.8 G/DL (ref 0.4–1.1)
BETA GLOBULIN: 0.6 G/DL (ref 0.5–1.2)
GAMMA GLOBULIN: 0.3 G/DL (ref 0.5–1.4)
IMMUNOFIXATION COMMENT: ABNORMAL
M-PROTEIN 1: 0.1 G/DL
M-PROTEIN 2: 0.1 G/DL
PATH REVIEW - SERUM IMMUNOFIXATION: ABNORMAL
PATH REVIEW-SERUM PROTEIN ELECTROPHORESIS: ABNORMAL
PROTEIN ELECTROPHORESIS COMMENT: ABNORMAL

## 2024-11-14 ENCOUNTER — APPOINTMENT (OUTPATIENT)
Dept: HEMATOLOGY/ONCOLOGY | Facility: HOSPITAL | Age: 79
End: 2024-11-14
Payer: MEDICARE

## 2024-12-09 DIAGNOSIS — C90.00 KAPPA LIGHT CHAIN MYELOMA (MULTI): ICD-10-CM

## 2024-12-09 DIAGNOSIS — I82.4Z3 DVT, LOWER EXTREMITY, DISTAL, ACUTE, BILATERAL (MULTI): ICD-10-CM

## 2024-12-14 DIAGNOSIS — C90.00 KAPPA LIGHT CHAIN MYELOMA (MULTI): ICD-10-CM

## 2024-12-17 RX ORDER — DULOXETIN HYDROCHLORIDE 30 MG/1
CAPSULE, DELAYED RELEASE ORAL
Qty: 90 CAPSULE | Refills: 7 | Status: SHIPPED | OUTPATIENT
Start: 2024-12-17

## 2025-02-05 ENCOUNTER — TELEPHONE (OUTPATIENT)
Dept: ADMISSION | Facility: HOSPITAL | Age: 80
End: 2025-02-05
Payer: MEDICARE

## 2025-02-05 NOTE — TELEPHONE ENCOUNTER
Patient's dentist called because David is scheduled for dental extractions on 2/26.     David is on Eliquis 5mg BID; asking if OK to hold Eliquis for the procedure/if team has special instructions for this.

## 2025-02-06 NOTE — TELEPHONE ENCOUNTER
Per Dr. Contreras: Called his dentist and advised to stop day before and day of procedure. Resume evening dose at night on day of oif no bleeding complication

## 2025-03-04 ENCOUNTER — APPOINTMENT (OUTPATIENT)
Dept: HEMATOLOGY/ONCOLOGY | Facility: HOSPITAL | Age: 80
End: 2025-03-04
Payer: MEDICARE

## 2025-03-11 ENCOUNTER — OFFICE VISIT (OUTPATIENT)
Dept: HEMATOLOGY/ONCOLOGY | Facility: HOSPITAL | Age: 80
End: 2025-03-11
Payer: MEDICARE

## 2025-03-11 ENCOUNTER — LAB (OUTPATIENT)
Dept: LAB | Facility: HOSPITAL | Age: 80
End: 2025-03-11
Payer: MEDICARE

## 2025-03-11 VITALS
BODY MASS INDEX: 29.42 KG/M2 | SYSTOLIC BLOOD PRESSURE: 147 MMHG | TEMPERATURE: 97.2 F | OXYGEN SATURATION: 96 % | DIASTOLIC BLOOD PRESSURE: 87 MMHG | WEIGHT: 176.81 LBS | HEART RATE: 91 BPM | RESPIRATION RATE: 16 BRPM

## 2025-03-11 DIAGNOSIS — I82.4Z3 DVT, LOWER EXTREMITY, DISTAL, ACUTE, BILATERAL (MULTI): ICD-10-CM

## 2025-03-11 DIAGNOSIS — C90.00 MULTIPLE MYELOMA NOT HAVING ACHIEVED REMISSION (MULTI): Primary | ICD-10-CM

## 2025-03-11 DIAGNOSIS — C90.00 MULTIPLE MYELOMA NOT HAVING ACHIEVED REMISSION (MULTI): ICD-10-CM

## 2025-03-11 LAB
ALBUMIN SERPL BCP-MCNC: 4.2 G/DL (ref 3.4–5)
ALP SERPL-CCNC: 87 U/L (ref 33–136)
ALT SERPL W P-5'-P-CCNC: 13 U/L (ref 10–52)
ANION GAP SERPL CALC-SCNC: 10 MMOL/L (ref 10–20)
AST SERPL W P-5'-P-CCNC: 12 U/L (ref 9–39)
BASOPHILS # BLD AUTO: 0.04 X10*3/UL (ref 0–0.1)
BASOPHILS NFR BLD AUTO: 0.4 %
BILIRUB SERPL-MCNC: 0.5 MG/DL (ref 0–1.2)
BUN SERPL-MCNC: 19 MG/DL (ref 6–23)
CALCIUM SERPL-MCNC: 10.2 MG/DL (ref 8.6–10.3)
CHLORIDE SERPL-SCNC: 106 MMOL/L (ref 98–107)
CO2 SERPL-SCNC: 29 MMOL/L (ref 21–32)
CREAT SERPL-MCNC: 0.93 MG/DL (ref 0.5–1.3)
EGFRCR SERPLBLD CKD-EPI 2021: 84 ML/MIN/1.73M*2
EOSINOPHIL # BLD AUTO: 0.1 X10*3/UL (ref 0–0.4)
EOSINOPHIL NFR BLD AUTO: 1.1 %
ERYTHROCYTE [DISTWIDTH] IN BLOOD BY AUTOMATED COUNT: 14.6 % (ref 11.5–14.5)
GLUCOSE SERPL-MCNC: 98 MG/DL (ref 74–99)
HCT VFR BLD AUTO: 43.1 % (ref 41–52)
HGB BLD-MCNC: 14.4 G/DL (ref 13.5–17.5)
IGA SERPL-MCNC: <7 MG/DL (ref 70–400)
IGG SERPL-MCNC: 310 MG/DL (ref 700–1600)
IGM SERPL-MCNC: 15 MG/DL (ref 40–230)
IMM GRANULOCYTES # BLD AUTO: 0.11 X10*3/UL (ref 0–0.5)
IMM GRANULOCYTES NFR BLD AUTO: 1.2 % (ref 0–0.9)
LDH SERPL L TO P-CCNC: 139 U/L (ref 84–246)
LYMPHOCYTES # BLD AUTO: 2.02 X10*3/UL (ref 0.8–3)
LYMPHOCYTES NFR BLD AUTO: 22.6 %
MCH RBC QN AUTO: 31.4 PG (ref 26–34)
MCHC RBC AUTO-ENTMCNC: 33.4 G/DL (ref 32–36)
MCV RBC AUTO: 94 FL (ref 80–100)
MONOCYTES # BLD AUTO: 0.59 X10*3/UL (ref 0.05–0.8)
MONOCYTES NFR BLD AUTO: 6.6 %
NEUTROPHILS # BLD AUTO: 6.06 X10*3/UL (ref 1.6–5.5)
NEUTROPHILS NFR BLD AUTO: 68.1 %
NRBC BLD-RTO: 0 /100 WBCS (ref 0–0)
PLATELET # BLD AUTO: 187 X10*3/UL (ref 150–450)
POTASSIUM SERPL-SCNC: 4.4 MMOL/L (ref 3.5–5.3)
PROT SERPL-MCNC: 5.9 G/DL (ref 6.4–8.2)
PROT SERPL-MCNC: 6.3 G/DL (ref 6.4–8.2)
RBC # BLD AUTO: 4.59 X10*6/UL (ref 4.5–5.9)
SODIUM SERPL-SCNC: 141 MMOL/L (ref 136–145)
WBC # BLD AUTO: 8.9 X10*3/UL (ref 4.4–11.3)

## 2025-03-11 PROCEDURE — 82784 ASSAY IGA/IGD/IGG/IGM EACH: CPT

## 2025-03-11 PROCEDURE — 85025 COMPLETE CBC W/AUTO DIFF WBC: CPT

## 2025-03-11 PROCEDURE — 86334 IMMUNOFIX E-PHORESIS SERUM: CPT

## 2025-03-11 PROCEDURE — 83521 IG LIGHT CHAINS FREE EACH: CPT

## 2025-03-11 PROCEDURE — 99215 OFFICE O/P EST HI 40 MIN: CPT | Performed by: INTERNAL MEDICINE

## 2025-03-11 PROCEDURE — 83615 LACTATE (LD) (LDH) ENZYME: CPT

## 2025-03-11 PROCEDURE — 80053 COMPREHEN METABOLIC PANEL: CPT

## 2025-03-11 PROCEDURE — 1126F AMNT PAIN NOTED NONE PRSNT: CPT | Performed by: INTERNAL MEDICINE

## 2025-03-11 PROCEDURE — 1159F MED LIST DOCD IN RCRD: CPT | Performed by: INTERNAL MEDICINE

## 2025-03-11 PROCEDURE — 36415 COLL VENOUS BLD VENIPUNCTURE: CPT

## 2025-03-11 PROCEDURE — 84155 ASSAY OF PROTEIN SERUM: CPT

## 2025-03-11 ASSESSMENT — ENCOUNTER SYMPTOMS
EYES NEGATIVE: 1
HEMATOLOGIC/LYMPHATIC NEGATIVE: 1
GASTROINTESTINAL NEGATIVE: 1
MUSCULOSKELETAL NEGATIVE: 1
CONSTITUTIONAL NEGATIVE: 1
NUMBNESS: 1
ENDOCRINE NEGATIVE: 1
CARDIOVASCULAR NEGATIVE: 1
PSYCHIATRIC NEGATIVE: 1
RESPIRATORY NEGATIVE: 1

## 2025-03-11 ASSESSMENT — PAIN SCALES - GENERAL: PAINLEVEL_OUTOF10: 0-NO PAIN

## 2025-03-11 NOTE — PROGRESS NOTES
Patient ID: David Ferreira is a 79 y.o. male.  Referring Physician: No referring provider defined for this encounter.  Primary Care Provider: No Assigned PCP Generic Provider, MD  Date of Service:  3/11/2025    Chief Complaint: IgG Kappa Multiple Myeloma      Interval History:      Patient is with newly diagnosed Multiple myeloma IgG kappa. He is here for further discussion re: auto SCT as part of consolidation therapy for Multiple Myeloma.     Recent BW showed elevated protein-> further work up c/w Multiple Myeloma. Bone marrow Bx(1/27/23) 30-40% kappa restricted plasma cells FISH without high risk features M protein 3.8 g/dL  B2M 3.8 IgG 6072 free KLC 1944, LLC 3.3  Kappa/Lambda ratio 589   alb 4.0  CT/PET 1/30/23 showed no abnormal lesions.  Patient denies recent weight loss, night sweats, poor appetites. He c/o right shoulder pain 2/2 rotator cuff tear.  He also c/o numbing/tingling both hands 2/2 CTS and bilateral LE pain for which he takes Gabapentin daily.    Treatment:    Velcade, Revlimid, Dexamethasone  Started in 3/2023.     Daratumumab, Kyprolis, Dexamethasone:  Therapy switched in 5/2023 due to slow response to therapy  Achieved CO     Autologous SCT:  Prep w/ Sonja 140  T=0. 2/7/24    PMHx: Peripheral neuropathy bilateral LE's 2016 on gabapentin   Rotator cuff tear right.  ~6 month ago     PSHx: CTS repair bilateral: right ~ 2 years ago, left 3-4 months ago     Fhx: mother with colon cancer at 74  Father with CAD at 59       Oncology History   Multiple myeloma   1/27/2023 Initial Diagnosis    Multiple Myeloma: IgG kappa. ISS stage II  - Bone marrow 30-40% kappa restricted plasma cells  - Fish negative   - CT/PET: negative  - b2M 3.8   - M protein 3.8  - IgG 6072  - Kappa LC 1944 mg/L  - K/L ratio 589  - normal Ca++, Cr, LDH, alb  - VRd started in 3/2023 then switched to D-Kd in late May 23. tolerating chemo well.  - Partial response after ~ 8 months Rx  - Bone marrow 11/9/23  5-10% kappa  restricted plasma cells.     2/7/2024 -  Bone Marrow Transplant    Collected on 1/29/24, only required 1 day.     Admitted on 2/5/24,  T=0, 2/7/24 for autologous transplant prepped with Melphalan 140 mg/m2.     Hospitalization complicated by:  1. chemotherapy-induced nausea and vomiting, managed with IV and oral anti-emetics, stable and tolerating PO at discharge  2. Diverticulitis, initially presented w/ abdominal pain, and demonstrated on CT AP. He was culture negative but treated w/ 7 days of IV zosyn and sent home on 7 days of flagy and levofloxacin.   3. Deconditioning, managed w/ PT while admitted, ordered home PT on discharge.  4. RUE edema 2/2 fluid overload and immobility of th R extremity due to a rotator cuff injury. Duplex sonography ordered while inpatient but cancelled at patient request due to delay in discharge. Patient and family reporting considerable improvement in RUE edema. Plan for outpatient US pending further evaluation by primary oncologist.          SUBJECTIVE:  History of Present Illness:  8/6/24 : routine follow up visit with his daughter and son. Developed DVT 3 months ago and started on Eliquis  11/5/24: doing well. Wife needs 24 hr care 2/2 worsening dementia. Still has hand/leg pain from neuropathy  3/11/25: doing really well. Neuropathy controlled with current meds. C/o knee pain(chronic)        Review of Systems   Constitutional: Negative.    HENT: Negative.     Eyes: Negative.    Respiratory: Negative.     Cardiovascular: Negative.    Gastrointestinal: Negative.    Endocrine: Negative.    Genitourinary: Negative.    Musculoskeletal: Negative.    Skin: Negative.    Allergic/Immunologic: Positive for immunocompromised state.   Neurological:  Positive for numbness.   Hematological: Negative.    Psychiatric/Behavioral: Negative.     All other systems reviewed and are negative.    OBJECTIVE:  KPS: Karnofsky Score: 80 - Normal activity with effort; some signs or symptoms of disease   VS:   /87   Pulse 91   Temp 36.2 °C (97.2 °F)   Resp 16   Wt 80.2 kg (176 lb 12.9 oz)   SpO2 96%   BMI 29.42 kg/m²   BSA: 1.92 meters squared    Physical Exam  Constitutional:       Appearance: Normal appearance. He is normal weight.   HENT:      Head: Normocephalic and atraumatic.      Nose: Nose normal.      Mouth/Throat:      Mouth: Mucous membranes are moist.      Pharynx: Oropharynx is clear.   Eyes:      Extraocular Movements: Extraocular movements intact.      Conjunctiva/sclera: Conjunctivae normal.      Pupils: Pupils are equal, round, and reactive to light.   Cardiovascular:      Rate and Rhythm: Normal rate and regular rhythm.      Pulses: Normal pulses.      Heart sounds: Normal heart sounds.   Pulmonary:      Effort: Pulmonary effort is normal.      Breath sounds: Normal breath sounds.   Abdominal:      General: Abdomen is flat. Bowel sounds are normal.      Palpations: Abdomen is soft.   Musculoskeletal:         General: Normal range of motion.      Cervical back: Normal range of motion and neck supple.   Skin:     General: Skin is warm and dry.   Neurological:      General: No focal deficit present.      Mental Status: He is alert and oriented to person, place, and time. Mental status is at baseline.   Psychiatric:         Mood and Affect: Mood normal.         Behavior: Behavior normal.         Thought Content: Thought content normal.         Judgment: Judgment normal.       Laboratory:  The pertinent laboratory results were reviewed and discussed with the patient.    Lab Results   Component Value Date    WBC 9.0 11/05/2024    HCT 46.0 11/05/2024    HGB 15.7 11/05/2024     11/05/2024    ANC 5.04 02/27/2024    K 4.5 11/05/2024    CALCIUM 10.7 (H) 11/05/2024     11/05/2024    MG 2.06 11/05/2024    ALT 18 11/05/2024    AST 15 11/05/2024    BUN 19 11/05/2024    CREATININE 0.90 11/05/2024    PHOS 2.1 (L) 02/22/2024    KAPPA 0.56 11/05/2024    LAMBDA 0.17 (L) 11/05/2024    KAPLS 3.29 (H)  11/05/2024    SPEP  11/05/2024     Aberrant band detected. See immunofixation.    Decrease in polyclonal gamma globulins.           IEPIN  11/05/2024 11/5/24 Known monoclonal IG kappa as two bands in the gamma region at 0.1 g/dL and 0.1 g/dL. Unchanged from the previous analysis on 8/6/24.     (L) 11/05/2024    IGM 6 (L) 11/05/2024    IGA <7 (L) 11/05/2024      Note: for a comprehensive list of the patient's lab results, access the Results Review activity.    ASSESSMENT and PLAN:    Plan:    Multiple Myeloma: IgG kappa. ISS stage II  - Bone marrow 30-40% kappa restricted plasma cells(1/27/23)  - VRd started in 3/2023 then switched to D-Kd in late May 23. tolerating chemo well.  - Partial response after ~ 8 months Rx  - Bone marrow 11/9/23  5-10% kappa restricted plasma cells.  - s/p autoSCT  T=0, 2/7/24 T+ 395  - on lino maintenance therapy started on  5/2024  - IgG 460>315  - kappaLC 0.44 mg/dL>0.55  - M protein 0.9>0.3 g/dL>>0.1  - cont current therapy    ID:  - Acyclovir 400mg BID     Peripheral neuropathy  - bilateral LE's : on Gabapentin and Cymbalta(increased to 60 mg 8/6/24)  - bilateral CTS-> s/p repair. still symptomatic    R Rotator Cuff Tear:  - Continued pain   - Needs surgery  - recommended to follow up with ortho    DVT:  - Ultrasound 3/28 showed bilateral non occlusive DVTs  - on Eliquis 5mg BID-> consider cutting to 2.5 mg BID  - tolerating well. No swelling today    RTC:  6 months    Ok-[place]ia Contreras MD

## 2025-03-12 LAB
KAPPA LC SERPL-MCNC: 0.6 MG/DL (ref 0.33–1.94)
KAPPA LC/LAMBDA SER: 3.16 {RATIO} (ref 0.26–1.65)
LAMBDA LC SERPL-MCNC: 0.19 MG/DL (ref 0.57–2.63)

## 2025-03-14 LAB
ALBUMIN: 4 G/DL (ref 3.4–5)
ALPHA 1 GLOBULIN: 0.3 G/DL (ref 0.2–0.6)
ALPHA 2 GLOBULIN: 0.8 G/DL (ref 0.4–1.1)
BETA GLOBULIN: 0.6 G/DL (ref 0.5–1.2)
GAMMA GLOBULIN: 0.3 G/DL (ref 0.5–1.4)
IMMUNOFIXATION COMMENT: ABNORMAL
M-PROTEIN 1: 0.1 G/DL
PATH REVIEW - SERUM IMMUNOFIXATION: ABNORMAL
PATH REVIEW-SERUM PROTEIN ELECTROPHORESIS: ABNORMAL
PROTEIN ELECTROPHORESIS COMMENT: ABNORMAL

## 2025-03-24 ENCOUNTER — TELEPHONE (OUTPATIENT)
Dept: ADMISSION | Facility: HOSPITAL | Age: 80
End: 2025-03-24
Payer: MEDICARE

## 2025-03-31 NOTE — TELEPHONE ENCOUNTER
Per Dr. Padilla:  Spoke to his dentist, current therapy, Daratumumab monthly is not going to cause an increased risk of jaw necrosis

## 2025-04-10 ENCOUNTER — TELEPHONE (OUTPATIENT)
Dept: ADMISSION | Facility: HOSPITAL | Age: 80
End: 2025-04-10
Payer: MEDICARE

## 2025-04-10 NOTE — TELEPHONE ENCOUNTER
Pt called- he saw his dentist who recommended that he needs to have 4 teeth pulled but there was some concern about him being on Eliquis. He is going to schedule a consult with an oral surgeon soon. I advised him to call us back after he meets with the oral surgeon to discuss if Eliquis needs held. Patient verbalized understanding and has no further questions or concerns at this time.

## 2025-04-21 DIAGNOSIS — I82.4Z3 DVT, LOWER EXTREMITY, DISTAL, ACUTE, BILATERAL: ICD-10-CM

## 2025-04-21 DIAGNOSIS — C90.00 KAPPA LIGHT CHAIN MYELOMA (MULTI): ICD-10-CM

## 2025-06-19 ENCOUNTER — TELEPHONE (OUTPATIENT)
Dept: ADMISSION | Facility: HOSPITAL | Age: 80
End: 2025-06-19
Payer: MEDICARE

## 2025-06-19 NOTE — TELEPHONE ENCOUNTER
Pt called to provide contact information for his local oncologist in [place] should it ever be needed:  Dr. Casey Carrillo  Phone #[phone]  Fax #[phone]